# Patient Record
Sex: MALE | Race: WHITE | Employment: OTHER | URBAN - METROPOLITAN AREA
[De-identification: names, ages, dates, MRNs, and addresses within clinical notes are randomized per-mention and may not be internally consistent; named-entity substitution may affect disease eponyms.]

---

## 2018-09-06 ENCOUNTER — TELEPHONE (OUTPATIENT)
Dept: ADMINISTRATIVE | Age: 68
End: 2018-09-06

## 2018-09-06 ENCOUNTER — HOSPITAL ENCOUNTER (EMERGENCY)
Age: 68
Discharge: HOME | End: 2018-09-06
Attending: FAMILY MEDICINE
Payer: COMMERCIAL

## 2018-09-06 VITALS
HEART RATE: 87 BPM | BODY MASS INDEX: 31.5 KG/M2 | RESPIRATION RATE: 16 BRPM | WEIGHT: 220 LBS | HEIGHT: 70 IN | OXYGEN SATURATION: 96 % | TEMPERATURE: 98 F | SYSTOLIC BLOOD PRESSURE: 147 MMHG | DIASTOLIC BLOOD PRESSURE: 102 MMHG

## 2018-09-06 DIAGNOSIS — I16.0 HYPERTENSIVE URGENCY: Primary | ICD-10-CM

## 2018-09-06 LAB
ANION GAP SERPL CALCULATED.3IONS-SCNC: 13 MMOL/L (ref 7–16)
BUN BLDV-MCNC: 15 MG/DL (ref 8–23)
CALCIUM SERPL-MCNC: 10.2 MG/DL (ref 8.6–10.2)
CHLORIDE BLD-SCNC: 107 MMOL/L (ref 98–107)
CO2: 23 MMOL/L (ref 22–29)
CREAT SERPL-MCNC: 1.1 MG/DL (ref 0.7–1.2)
EKG ATRIAL RATE: 101 BPM
EKG P AXIS: 15 DEGREES
EKG P-R INTERVAL: 164 MS
EKG Q-T INTERVAL: 346 MS
EKG QRS DURATION: 82 MS
EKG QTC CALCULATION (BAZETT): 448 MS
EKG R AXIS: -24 DEGREES
EKG T AXIS: -5 DEGREES
EKG VENTRICULAR RATE: 101 BPM
GFR AFRICAN AMERICAN: >60
GFR NON-AFRICAN AMERICAN: >60 ML/MIN/1.73
GLUCOSE BLD-MCNC: 106 MG/DL (ref 74–109)
HCT VFR BLD CALC: 42.2 % (ref 37–54)
HEMOGLOBIN: 14 G/DL (ref 12.5–16.5)
MCH RBC QN AUTO: 32.6 PG (ref 26–35)
MCHC RBC AUTO-ENTMCNC: 33.2 % (ref 32–34.5)
MCV RBC AUTO: 98.1 FL (ref 80–99.9)
PDW BLD-RTO: 12.3 FL (ref 11.5–15)
PLATELET # BLD: 228 E9/L (ref 130–450)
PMV BLD AUTO: 8.9 FL (ref 7–12)
POTASSIUM SERPL-SCNC: 4.4 MMOL/L (ref 3.5–5)
RBC # BLD: 4.3 E12/L (ref 3.8–5.8)
SODIUM BLD-SCNC: 143 MMOL/L (ref 132–146)
TROPONIN: <0.01 NG/ML (ref 0–0.03)
WBC # BLD: 9.4 E9/L (ref 4.5–11.5)

## 2018-09-06 PROCEDURE — 93005 ELECTROCARDIOGRAM TRACING: CPT | Performed by: FAMILY MEDICINE

## 2018-09-06 PROCEDURE — 84484 ASSAY OF TROPONIN QUANT: CPT

## 2018-09-06 PROCEDURE — 85027 COMPLETE CBC AUTOMATED: CPT

## 2018-09-06 PROCEDURE — 80048 BASIC METABOLIC PNL TOTAL CA: CPT

## 2018-09-06 PROCEDURE — 36415 COLL VENOUS BLD VENIPUNCTURE: CPT

## 2018-09-06 PROCEDURE — 99283 EMERGENCY DEPT VISIT LOW MDM: CPT

## 2018-09-06 PROCEDURE — 6370000000 HC RX 637 (ALT 250 FOR IP): Performed by: FAMILY MEDICINE

## 2018-09-06 RX ORDER — CLONIDINE HYDROCHLORIDE 0.1 MG/1
0.1 TABLET ORAL 2 TIMES DAILY
Qty: 30 TABLET | Refills: 0 | Status: SHIPPED | OUTPATIENT
Start: 2018-09-06 | End: 2018-09-21 | Stop reason: SDUPTHER

## 2018-09-06 RX ORDER — CLONIDINE HYDROCHLORIDE 0.1 MG/1
0.2 TABLET ORAL ONCE
Status: COMPLETED | OUTPATIENT
Start: 2018-09-06 | End: 2018-09-06

## 2018-09-06 RX ADMIN — CLONIDINE HYDROCHLORIDE 0.2 MG: 0.1 TABLET ORAL at 13:40

## 2018-09-06 NOTE — TELEPHONE ENCOUNTER
bp was elevated 22/170. Marquis Cavazos told me to tell him to go to emergency or Urgent care. He will still keep appt for Dr Mare Desir at 2:30. His sister is going to try and talk him into going. He mentioned he don't want to sit around for 6 hrs so I told him to go to Urgent care down the road.

## 2018-09-06 NOTE — ED PROVIDER NOTES
HPI:  9/6/18,   Time: 1:34 PM         Dieter Moncada is a 76 y.o. male presenting to the ED for elevated blood pressure found when visiting nurse saw patient at home, beginning several hours ago. The complaint has been persistent, severe in severity, and worsened by nothing. Patient denies any headache blurred vision nausea vomiting neck pain chest or abdominal pain no shortness of breath no palpitations. Denies any numbness tingling or weakness. No leg swelling. He has a doctor's appointment tomorrow he is under no current medications. Denies any history of hypertension. ROS:   Pertinent positives and negatives are stated within HPI, all other systems reviewed and are negative.  --------------------------------------------- PAST HISTORY ---------------------------------------------  Past Medical History:  has no past medical history on file. Past Surgical History:  has no past surgical history on file. Social History:  reports that he has never smoked. He has never used smokeless tobacco. He reports that he does not drink alcohol or use drugs. Family History: family history is not on file. The patients home medications have been reviewed. Allergies: Patient has no known allergies.     -------------------------------------------------- RESULTS -------------------------------------------------  All laboratory and radiology results have been personally reviewed by myself   LABS:  Results for orders placed or performed during the hospital encounter of 51/40/57   Basic metabolic panel   Result Value Ref Range    Sodium 143 132 - 146 mmol/L    Potassium 4.4 3.5 - 5.0 mmol/L    Chloride 107 98 - 107 mmol/L    CO2 23 22 - 29 mmol/L    Anion Gap 13 7 - 16 mmol/L    Glucose 106 74 - 109 mg/dL    BUN 15 8 - 23 mg/dL    CREATININE 1.1 0.7 - 1.2 mg/dL    GFR Non-African American >60 >=60 mL/min/1.73    GFR African American >60     Calcium 10.2 8.6 - 10.2 mg/dL   CBC   Result Value Ref Range    WBC 9.4 4.5 Affect      ------------------------------ ED COURSE/MEDICAL DECISION MAKING----------------------  Medications   cloNIDine (CATAPRES) tablet 0.2 mg (0.2 mg Oral Given 9/6/18 1340)         Medical Decision Making:    Patient is asymptomatic no focal neurological findings or headache no blurred vision no chest pain no abdominal pain no neck pain blood pressure improved will discharge home advised follow-up tomorrow with his PMD for further long-term blood pressure control    Counseling: The emergency provider has spoken with the patient and spouse/SO and discussed todays results, in addition to providing specific details for the plan of care and counseling regarding the diagnosis and prognosis. Questions are answered at this time and they are agreeable with the plan.      --------------------------------- IMPRESSION AND DISPOSITION ---------------------------------    IMPRESSION  1.  Hypertensive urgency        DISPOSITION  Disposition: Discharge to home  Patient condition is good                  Jovita Gr MD  09/06/18 1500

## 2018-09-06 NOTE — ED TRIAGE NOTES
Co headache intermittently for couple of months. Home nurse checked his b/p today & found his bp to be elevated. Pt denies co chest pain, visual defects, lightheadedness.  Pt has Dr dias tomorrow with PCP

## 2018-09-07 ENCOUNTER — OFFICE VISIT (OUTPATIENT)
Dept: FAMILY MEDICINE CLINIC | Age: 68
End: 2018-09-07
Payer: COMMERCIAL

## 2018-09-07 ENCOUNTER — HOSPITAL ENCOUNTER (OUTPATIENT)
Age: 68
Discharge: HOME | End: 2018-09-09
Payer: COMMERCIAL

## 2018-09-07 VITALS
BODY MASS INDEX: 33.21 KG/M2 | DIASTOLIC BLOOD PRESSURE: 121 MMHG | OXYGEN SATURATION: 91 % | TEMPERATURE: 98.3 F | HEIGHT: 70 IN | RESPIRATION RATE: 20 BRPM | WEIGHT: 232 LBS | HEART RATE: 150 BPM | SYSTOLIC BLOOD PRESSURE: 180 MMHG

## 2018-09-07 DIAGNOSIS — Z11.59 NEED FOR HEPATITIS C SCREENING TEST: ICD-10-CM

## 2018-09-07 DIAGNOSIS — I10 ESSENTIAL HYPERTENSION: Primary | ICD-10-CM

## 2018-09-07 DIAGNOSIS — E74.89 OTHER SPECIFIED DISORDERS OF CARBOHYDRATE METABOLISM (HCC): ICD-10-CM

## 2018-09-07 DIAGNOSIS — I10 ESSENTIAL HYPERTENSION: ICD-10-CM

## 2018-09-07 LAB
CHOLESTEROL, TOTAL: 156 MG/DL (ref 0–199)
HBA1C MFR BLD: 6 % (ref 4–5.6)
HDLC SERPL-MCNC: 46 MG/DL
LDL CHOLESTEROL CALCULATED: 79 MG/DL (ref 0–99)
TRIGL SERPL-MCNC: 155 MG/DL (ref 0–149)
TSH SERPL DL<=0.05 MIU/L-ACNC: 0.96 UIU/ML (ref 0.27–4.2)
VLDLC SERPL CALC-MCNC: 31 MG/DL

## 2018-09-07 PROCEDURE — 99203 OFFICE O/P NEW LOW 30 MIN: CPT | Performed by: FAMILY MEDICINE

## 2018-09-07 PROCEDURE — 86803 HEPATITIS C AB TEST: CPT

## 2018-09-07 PROCEDURE — 83036 HEMOGLOBIN GLYCOSYLATED A1C: CPT

## 2018-09-07 PROCEDURE — 84443 ASSAY THYROID STIM HORMONE: CPT

## 2018-09-07 PROCEDURE — 80061 LIPID PANEL: CPT

## 2018-09-07 RX ORDER — HYDROCHLOROTHIAZIDE 12.5 MG/1
12.5 TABLET ORAL DAILY
Qty: 30 TABLET | Refills: 3 | Status: ON HOLD | OUTPATIENT
Start: 2018-09-07 | End: 2018-10-25 | Stop reason: HOSPADM

## 2018-09-07 ASSESSMENT — PATIENT HEALTH QUESTIONNAIRE - PHQ9
2. FEELING DOWN, DEPRESSED OR HOPELESS: 0
SUM OF ALL RESPONSES TO PHQ9 QUESTIONS 1 & 2: 0
SUM OF ALL RESPONSES TO PHQ QUESTIONS 1-9: 0
SUM OF ALL RESPONSES TO PHQ QUESTIONS 1-9: 0
1. LITTLE INTEREST OR PLEASURE IN DOING THINGS: 0

## 2018-09-07 NOTE — PROGRESS NOTES
Subjective:     Patient: Marlin Marsh is a 76 y.o. male    [facility] sent out nurse. Never told that before. Hasn't been to doctor in 39 years. Denies CP, palp, SOB, cogh, blurry vision or HA. Advil 3tablets twice dialy    Review of Systems     No Known Allergies  Current Outpatient Prescriptions on File Prior to Visit   Medication Sig Dispense Refill    cloNIDine (CATAPRES) 0.1 MG tablet Take 1 tablet by mouth 2 times daily 30 tablet 0     No current facility-administered medications on file prior to visit. Past Medical History:   Diagnosis Date    Hypertension       There is no problem list on file for this patient. Social History   Substance Use Topics    Smoking status: Never Smoker    Smokeless tobacco: Never Used    Alcohol use No          Objective:     BP (!) 180/121   Pulse 150   Temp 98.3 °F (36.8 °C) (Oral)   Resp 20   Ht 5' 10\" (1.778 m)   Wt 232 lb (105.2 kg)   SpO2 91%   BMI 33.29 kg/m²     Physical Exam   Constitutional: He appears well-developed and well-nourished. Cardiovascular: Normal rate, regular rhythm, normal heart sounds and intact distal pulses. Exam reveals no gallop. No murmur heard. DP pulse 2/4 without edema   Pulmonary/Chest: Effort normal and breath sounds normal. No respiratory distress. He has no wheezes. Assessment      1. Essential hypertension    2. Need for hepatitis C screening test    3. Other specified disorders [facility] carbohydrate [facility] ([facility]          Plan      1. Essential hypertension  - TSH without Reflex; Future  - Lipid Panel; Future  - Hemoglobin A1C; Future  - hydrochlorothiazide (HYDRODIURIL) 12.5 MG tablet; Take 1 tablet by mouth daily  Dispense: 30 tablet; Refill: 3  BP not at goal will start HCTZ and reeval in 1-2 weeks. Eventually will get patietn off clonidine. 2. Need for hepatitis C screening test  - Hepatitis C Antibody; Future    3.  Other specified disorders of carbohydrate metabolism ([facility]   - Hemoglobin A1C; Future      Janett Pederson DO  9/7/18  2:59 PM

## 2018-09-10 LAB — HEPATITIS C ANTIBODY INTERPRETATION: NORMAL

## 2018-09-21 ENCOUNTER — OFFICE VISIT (OUTPATIENT)
Dept: FAMILY MEDICINE CLINIC | Age: 68
End: 2018-09-21
Payer: COMMERCIAL

## 2018-09-21 VITALS
WEIGHT: 224 LBS | RESPIRATION RATE: 14 BRPM | DIASTOLIC BLOOD PRESSURE: 98 MMHG | BODY MASS INDEX: 32.07 KG/M2 | OXYGEN SATURATION: 97 % | TEMPERATURE: 97.7 F | HEART RATE: 81 BPM | SYSTOLIC BLOOD PRESSURE: 140 MMHG | HEIGHT: 70 IN

## 2018-09-21 DIAGNOSIS — I10 ESSENTIAL HYPERTENSION: Primary | ICD-10-CM

## 2018-09-21 DIAGNOSIS — Z12.11 SCREEN FOR COLON CANCER: ICD-10-CM

## 2018-09-21 DIAGNOSIS — Z00.00 GENERAL MEDICAL EXAM: ICD-10-CM

## 2018-09-21 PROCEDURE — 99214 OFFICE O/P EST MOD 30 MIN: CPT | Performed by: FAMILY MEDICINE

## 2018-09-21 RX ORDER — CLONIDINE HYDROCHLORIDE 0.1 MG/1
TABLET ORAL
Qty: 5 TABLET | Refills: 0 | Status: SHIPPED | OUTPATIENT
Start: 2018-09-21 | End: 2018-10-19

## 2018-09-21 RX ORDER — AMLODIPINE BESYLATE 10 MG/1
10 TABLET ORAL DAILY
Qty: 30 TABLET | Refills: 3 | Status: ON HOLD | OUTPATIENT
Start: 2018-09-21 | End: 2018-10-25 | Stop reason: HOSPADM

## 2018-09-21 RX ORDER — MEDICAL SUPPLY, MISCELLANEOUS
EACH MISCELLANEOUS
Qty: 1 EACH | Refills: 0 | Status: SHIPPED | OUTPATIENT
Start: 2018-09-21 | End: 2019-03-06

## 2018-09-21 ASSESSMENT — ENCOUNTER SYMPTOMS
PHOTOPHOBIA: 0
CONSTIPATION: 0
RHINORRHEA: 0
COUGH: 0
SHORTNESS OF BREATH: 0
VOMITING: 0
SORE THROAT: 0
ABDOMINAL PAIN: 0
NAUSEA: 0
DIARRHEA: 0
BLOOD IN STOOL: 0
SINUS PRESSURE: 0
WHEEZING: 0

## 2018-09-21 NOTE — PROGRESS NOTES
Subjective:     Patient: Frandy Sanchez is a 76 y.o. male    HPI Denies CP, palpo, SOB, cough. Denies Problems with BP pills. One pill left from hospital.    Dry mouth. Review of Systems   Constitutional: Negative for chills, fatigue and fever. HENT: Negative for congestion, ear pain, rhinorrhea, sinus pressure and sore throat. Eyes: Negative for photophobia and visual disturbance. Respiratory: Negative for cough, shortness of breath and wheezing. Cardiovascular: Negative for chest pain, palpitations and leg swelling. Gastrointestinal: Negative for abdominal pain, blood in stool, constipation, diarrhea, nausea and vomiting. Neg melena   Endocrine: Negative for polydipsia, polyphagia and polyuria. Genitourinary: Negative for dysuria, frequency and urgency. Musculoskeletal: Negative for arthralgias and joint swelling. Skin: Negative for rash and wound. Neurological: Negative for dizziness, weakness, numbness and headaches. No Known Allergies  Current Outpatient Prescriptions on File Prior to Visit   Medication Sig Dispense Refill    hydrochlorothiazide (HYDRODIURIL) 12.5 MG tablet Take 1 tablet by mouth daily 30 tablet 3     No current facility-administered medications on file prior to visit. Past Medical History:   Diagnosis Date    Hypertension       There is no problem list on file for this patient. Social History   Substance Use Topics    Smoking status: Never Smoker    Smokeless tobacco: Never Used    Alcohol use No          Objective:     BP (!) 140/98 (Site: Left Upper Arm, Position: Sitting, Cuff Size: Large Adult)   Pulse 81   Temp 97.7 °F (36.5 °C) (Oral)   Resp 14   Ht 5' 10\" (1.778 m)   Wt 224 lb (101.6 kg)   SpO2 97%   BMI 32.14 kg/m²     Physical Exam   Constitutional: He is oriented to person, place, and time. He appears well-developed and well-nourished. No distress. HENT:   Head: Normocephalic and atraumatic.    Right Ear: External ear

## 2018-10-05 ENCOUNTER — OFFICE VISIT (OUTPATIENT)
Dept: FAMILY MEDICINE CLINIC | Age: 68
End: 2018-10-05
Payer: COMMERCIAL

## 2018-10-05 VITALS
SYSTOLIC BLOOD PRESSURE: 148 MMHG | WEIGHT: 221 LBS | HEIGHT: 70 IN | RESPIRATION RATE: 16 BRPM | OXYGEN SATURATION: 97 % | HEART RATE: 89 BPM | TEMPERATURE: 98.9 F | BODY MASS INDEX: 31.64 KG/M2 | DIASTOLIC BLOOD PRESSURE: 102 MMHG

## 2018-10-05 DIAGNOSIS — I10 ESSENTIAL HYPERTENSION: ICD-10-CM

## 2018-10-05 PROCEDURE — 99213 OFFICE O/P EST LOW 20 MIN: CPT | Performed by: FAMILY MEDICINE

## 2018-10-05 RX ORDER — LISINOPRIL 20 MG/1
20 TABLET ORAL DAILY
Qty: 30 TABLET | Refills: 3 | Status: SHIPPED | OUTPATIENT
Start: 2018-10-05 | End: 2018-10-19 | Stop reason: SINTOL

## 2018-10-19 ENCOUNTER — OFFICE VISIT (OUTPATIENT)
Dept: FAMILY MEDICINE CLINIC | Age: 68
End: 2018-10-19
Payer: COMMERCIAL

## 2018-10-19 ENCOUNTER — HOSPITAL ENCOUNTER (OUTPATIENT)
Age: 68
Discharge: HOME | End: 2018-10-19
Payer: COMMERCIAL

## 2018-10-19 VITALS
SYSTOLIC BLOOD PRESSURE: 146 MMHG | DIASTOLIC BLOOD PRESSURE: 88 MMHG | OXYGEN SATURATION: 99 % | TEMPERATURE: 98.2 F | BODY MASS INDEX: 31.64 KG/M2 | RESPIRATION RATE: 20 BRPM | HEART RATE: 84 BPM | HEIGHT: 70 IN | WEIGHT: 221 LBS

## 2018-10-19 DIAGNOSIS — I10 ESSENTIAL HYPERTENSION: Primary | ICD-10-CM

## 2018-10-19 DIAGNOSIS — I10 ESSENTIAL HYPERTENSION: ICD-10-CM

## 2018-10-19 LAB
ANION GAP SERPL CALCULATED.3IONS-SCNC: 16 MMOL/L (ref 7–16)
BUN BLDV-MCNC: 27 MG/DL (ref 8–23)
CALCIUM SERPL-MCNC: 10.1 MG/DL (ref 8.6–10.2)
CHLORIDE BLD-SCNC: 101 MMOL/L (ref 98–107)
CO2: 24 MMOL/L (ref 22–29)
CREAT SERPL-MCNC: 1.8 MG/DL (ref 0.7–1.2)
GFR AFRICAN AMERICAN: 46
GFR NON-AFRICAN AMERICAN: 38 ML/MIN/1.73
GLUCOSE BLD-MCNC: 120 MG/DL (ref 74–109)
POTASSIUM SERPL-SCNC: 4.1 MMOL/L (ref 3.5–5)
SODIUM BLD-SCNC: 141 MMOL/L (ref 132–146)

## 2018-10-19 PROCEDURE — 99213 OFFICE O/P EST LOW 20 MIN: CPT | Performed by: FAMILY MEDICINE

## 2018-10-19 PROCEDURE — 80048 BASIC METABOLIC PNL TOTAL CA: CPT

## 2018-10-19 PROCEDURE — 36415 COLL VENOUS BLD VENIPUNCTURE: CPT

## 2018-10-23 ENCOUNTER — HOSPITAL ENCOUNTER (INPATIENT)
Age: 68
LOS: 2 days | Discharge: HOME | DRG: 247 | End: 2018-10-25
Attending: EMERGENCY MEDICINE | Admitting: FAMILY MEDICINE
Payer: COMMERCIAL

## 2018-10-23 ENCOUNTER — HOSPITAL ENCOUNTER (OUTPATIENT)
Age: 68
Discharge: HOME | End: 2018-10-23
Payer: COMMERCIAL

## 2018-10-23 ENCOUNTER — APPOINTMENT (OUTPATIENT)
Dept: GENERAL RADIOLOGY | Age: 68
DRG: 247 | End: 2018-10-23
Payer: COMMERCIAL

## 2018-10-23 DIAGNOSIS — I21.9: ICD-10-CM

## 2018-10-23 DIAGNOSIS — I21.11 ST ELEVATION MYOCARDIAL INFARCTION INVOLVING RIGHT CORONARY ARTERY (HCC): Primary | ICD-10-CM

## 2018-10-23 PROBLEM — I10 ESSENTIAL HYPERTENSION: Status: ACTIVE | Noted: 2018-10-23

## 2018-10-23 PROBLEM — I21.3 STEMI (ST ELEVATION MYOCARDIAL INFARCTION) (HCC): Status: ACTIVE | Noted: 2018-10-23

## 2018-10-23 LAB
ABO/RH: NORMAL
ANION GAP SERPL CALCULATED.3IONS-SCNC: 17 MMOL/L (ref 7–16)
ANTIBODY IDENTIFICATION: NORMAL
ANTIBODY SCREEN: NORMAL
APTT: 29.1 SEC (ref 25.7–34.7)
BUN BLDV-MCNC: 30 MG/DL (ref 8–23)
CALCIUM SERPL-MCNC: 9.9 MG/DL (ref 8.6–10.2)
CHLORIDE BLD-SCNC: 99 MMOL/L (ref 98–107)
CO2: 23 MMOL/L (ref 22–29)
CREAT SERPL-MCNC: 1.7 MG/DL (ref 0.7–1.2)
DAT POLYSPECIFIC: NORMAL
DR. NOTIFY: NORMAL
EKG ATRIAL RATE: 80 BPM
EKG ATRIAL RATE: 87 BPM
EKG P AXIS: 27 DEGREES
EKG P AXIS: 32 DEGREES
EKG P-R INTERVAL: 162 MS
EKG P-R INTERVAL: 172 MS
EKG Q-T INTERVAL: 364 MS
EKG Q-T INTERVAL: 388 MS
EKG QRS DURATION: 102 MS
EKG QRS DURATION: 82 MS
EKG QTC CALCULATION (BAZETT): 438 MS
EKG QTC CALCULATION (BAZETT): 447 MS
EKG R AXIS: -24 DEGREES
EKG R AXIS: -34 DEGREES
EKG T AXIS: 22 DEGREES
EKG T AXIS: 34 DEGREES
EKG VENTRICULAR RATE: 80 BPM
EKG VENTRICULAR RATE: 87 BPM
GFR AFRICAN AMERICAN: 49
GFR NON-AFRICAN AMERICAN: 40 ML/MIN/1.73
GLUCOSE BLD-MCNC: 158 MG/DL (ref 74–109)
HCT VFR BLD CALC: 33.8 % (ref 37–54)
HEMOGLOBIN: 11.3 G/DL (ref 12.5–16.5)
LACTIC ACID: 2.1 MMOL/L (ref 0.5–2.2)
LV EF: 91 %
LVEF MODALITY: NORMAL
MCH RBC QN AUTO: 31 PG (ref 26–35)
MCHC RBC AUTO-ENTMCNC: 33.4 % (ref 32–34.5)
MCV RBC AUTO: 92.9 FL (ref 80–99.9)
PDW BLD-RTO: 11 FL (ref 11.5–15)
PLATELET # BLD: 242 E9/L (ref 130–450)
PMV BLD AUTO: 9.8 FL (ref 7–12)
POTASSIUM SERPL-SCNC: 3.6 MMOL/L (ref 3.5–5)
PRO-BNP: 283 PG/ML (ref 0–125)
RBC # BLD: 3.64 E12/L (ref 3.8–5.8)
SODIUM BLD-SCNC: 139 MMOL/L (ref 132–146)
TROPONIN: <0.01 NG/ML (ref 0–0.03)
WBC # BLD: 11.5 E9/L (ref 4.5–11.5)

## 2018-10-23 PROCEDURE — 99291 CRITICAL CARE FIRST HOUR: CPT

## 2018-10-23 PROCEDURE — 86880 COOMBS TEST DIRECT: CPT

## 2018-10-23 PROCEDURE — 93005 ELECTROCARDIOGRAM TRACING: CPT | Performed by: EMERGENCY MEDICINE

## 2018-10-23 PROCEDURE — 83605 ASSAY OF LACTIC ACID: CPT

## 2018-10-23 PROCEDURE — 36415 COLL VENOUS BLD VENIPUNCTURE: CPT

## 2018-10-23 PROCEDURE — 93458 L HRT ARTERY/VENTRICLE ANGIO: CPT | Performed by: INTERNAL MEDICINE

## 2018-10-23 PROCEDURE — C9606 PERC D-E COR REVASC W AMI S: HCPCS | Performed by: INTERNAL MEDICINE

## 2018-10-23 PROCEDURE — 71045 X-RAY EXAM CHEST 1 VIEW: CPT

## 2018-10-23 PROCEDURE — 93306 TTE W/DOPPLER COMPLETE: CPT

## 2018-10-23 PROCEDURE — C1874 STENT, COATED/COV W/DEL SYS: HCPCS

## 2018-10-23 PROCEDURE — 86850 RBC ANTIBODY SCREEN: CPT

## 2018-10-23 PROCEDURE — 6360000002 HC RX W HCPCS: Performed by: EMERGENCY MEDICINE

## 2018-10-23 PROCEDURE — 83880 ASSAY OF NATRIURETIC PEPTIDE: CPT

## 2018-10-23 PROCEDURE — 99222 1ST HOSP IP/OBS MODERATE 55: CPT | Performed by: FAMILY MEDICINE

## 2018-10-23 PROCEDURE — 99291 CRITICAL CARE FIRST HOUR: CPT | Performed by: INTERNAL MEDICINE

## 2018-10-23 PROCEDURE — 93005 ELECTROCARDIOGRAM TRACING: CPT | Performed by: INTERNAL MEDICINE

## 2018-10-23 PROCEDURE — 86900 BLOOD TYPING SEROLOGIC ABO: CPT

## 2018-10-23 PROCEDURE — C1894 INTRO/SHEATH, NON-LASER: HCPCS

## 2018-10-23 PROCEDURE — 86922 COMPATIBILITY TEST ANTIGLOB: CPT

## 2018-10-23 PROCEDURE — 6370000000 HC RX 637 (ALT 250 FOR IP): Performed by: EMERGENCY MEDICINE

## 2018-10-23 PROCEDURE — 2580000003 HC RX 258: Performed by: EMERGENCY MEDICINE

## 2018-10-23 PROCEDURE — C1887 CATHETER, GUIDING: HCPCS

## 2018-10-23 PROCEDURE — A0427 ALS1-EMERGENCY: HCPCS

## 2018-10-23 PROCEDURE — C1725 CATH, TRANSLUMIN NON-LASER: HCPCS

## 2018-10-23 PROCEDURE — 027034Z DILATION OF CORONARY ARTERY, ONE ARTERY WITH DRUG-ELUTING INTRALUMINAL DEVICE, PERCUTANEOUS APPROACH: ICD-10-PCS | Performed by: INTERNAL MEDICINE

## 2018-10-23 PROCEDURE — 85027 COMPLETE CBC AUTOMATED: CPT

## 2018-10-23 PROCEDURE — C1769 GUIDE WIRE: HCPCS

## 2018-10-23 PROCEDURE — 6370000000 HC RX 637 (ALT 250 FOR IP): Performed by: INTERNAL MEDICINE

## 2018-10-23 PROCEDURE — 2709999900 HC NON-CHARGEABLE SUPPLY

## 2018-10-23 PROCEDURE — 92941 PRQ TRLML REVSC TOT OCCL AMI: CPT | Performed by: INTERNAL MEDICINE

## 2018-10-23 PROCEDURE — B2111ZZ FLUOROSCOPY OF MULTIPLE CORONARY ARTERIES USING LOW OSMOLAR CONTRAST: ICD-10-PCS | Performed by: INTERNAL MEDICINE

## 2018-10-23 PROCEDURE — 6360000002 HC RX W HCPCS

## 2018-10-23 PROCEDURE — 2000000000 HC ICU R&B

## 2018-10-23 PROCEDURE — 96375 TX/PRO/DX INJ NEW DRUG ADDON: CPT

## 2018-10-23 PROCEDURE — 2700000000 HC OXYGEN THERAPY PER DAY

## 2018-10-23 PROCEDURE — A0425 GROUND MILEAGE: HCPCS

## 2018-10-23 PROCEDURE — 80048 BASIC METABOLIC PNL TOTAL CA: CPT

## 2018-10-23 PROCEDURE — 96374 THER/PROPH/DIAG INJ IV PUSH: CPT

## 2018-10-23 PROCEDURE — 2500000003 HC RX 250 WO HCPCS

## 2018-10-23 PROCEDURE — 86870 RBC ANTIBODY IDENTIFICATION: CPT

## 2018-10-23 PROCEDURE — 4A023N7 MEASUREMENT OF CARDIAC SAMPLING AND PRESSURE, LEFT HEART, PERCUTANEOUS APPROACH: ICD-10-PCS | Performed by: INTERNAL MEDICINE

## 2018-10-23 PROCEDURE — 84484 ASSAY OF TROPONIN QUANT: CPT

## 2018-10-23 PROCEDURE — 86901 BLOOD TYPING SEROLOGIC RH(D): CPT

## 2018-10-23 PROCEDURE — 2580000003 HC RX 258: Performed by: INTERNAL MEDICINE

## 2018-10-23 PROCEDURE — 85730 THROMBOPLASTIN TIME PARTIAL: CPT

## 2018-10-23 RX ORDER — MORPHINE SULFATE 4 MG/ML
INJECTION, SOLUTION INTRAMUSCULAR; INTRAVENOUS
Status: COMPLETED
Start: 2018-10-23 | End: 2018-10-23

## 2018-10-23 RX ORDER — SODIUM CHLORIDE 0.9 % (FLUSH) 0.9 %
10 SYRINGE (ML) INJECTION EVERY 12 HOURS SCHEDULED
Status: DISCONTINUED | OUTPATIENT
Start: 2018-10-23 | End: 2018-10-25 | Stop reason: HOSPADM

## 2018-10-23 RX ORDER — SODIUM CHLORIDE 0.9 % (FLUSH) 0.9 %
10 SYRINGE (ML) INJECTION PRN
Status: DISCONTINUED | OUTPATIENT
Start: 2018-10-23 | End: 2018-10-25 | Stop reason: HOSPADM

## 2018-10-23 RX ORDER — METOPROLOL SUCCINATE 50 MG/1
50 TABLET, EXTENDED RELEASE ORAL DAILY
Status: DISCONTINUED | OUTPATIENT
Start: 2018-10-23 | End: 2018-10-25

## 2018-10-23 RX ORDER — ATORVASTATIN CALCIUM 40 MG/1
80 TABLET, FILM COATED ORAL NIGHTLY
Status: DISCONTINUED | OUTPATIENT
Start: 2018-10-23 | End: 2018-10-25 | Stop reason: HOSPADM

## 2018-10-23 RX ORDER — ASPIRIN 81 MG/1
324 TABLET, CHEWABLE ORAL ONCE
Status: COMPLETED | OUTPATIENT
Start: 2018-10-23 | End: 2018-10-23

## 2018-10-23 RX ORDER — ONDANSETRON 2 MG/ML
4 INJECTION INTRAMUSCULAR; INTRAVENOUS EVERY 6 HOURS PRN
Status: DISCONTINUED | OUTPATIENT
Start: 2018-10-23 | End: 2018-10-25 | Stop reason: HOSPADM

## 2018-10-23 RX ORDER — ACETAMINOPHEN 325 MG/1
650 TABLET ORAL EVERY 4 HOURS PRN
Status: DISCONTINUED | OUTPATIENT
Start: 2018-10-23 | End: 2018-10-25 | Stop reason: HOSPADM

## 2018-10-23 RX ORDER — FAMOTIDINE 20 MG/1
20 TABLET, FILM COATED ORAL 2 TIMES DAILY
Status: DISCONTINUED | OUTPATIENT
Start: 2018-10-23 | End: 2018-10-25 | Stop reason: HOSPADM

## 2018-10-23 RX ORDER — ASPIRIN 81 MG/1
81 TABLET, CHEWABLE ORAL DAILY
Status: DISCONTINUED | OUTPATIENT
Start: 2018-10-24 | End: 2018-10-25 | Stop reason: HOSPADM

## 2018-10-23 RX ORDER — AMLODIPINE BESYLATE 10 MG/1
10 TABLET ORAL DAILY
Status: DISCONTINUED | OUTPATIENT
Start: 2018-10-23 | End: 2018-10-23

## 2018-10-23 RX ORDER — 0.9 % SODIUM CHLORIDE 0.9 %
500 INTRAVENOUS SOLUTION INTRAVENOUS ONCE
Status: COMPLETED | OUTPATIENT
Start: 2018-10-23 | End: 2018-10-23

## 2018-10-23 RX ORDER — MORPHINE SULFATE 2 MG/ML
2 INJECTION, SOLUTION INTRAMUSCULAR; INTRAVENOUS ONCE
Status: DISCONTINUED | OUTPATIENT
Start: 2018-10-23 | End: 2018-10-25 | Stop reason: HOSPADM

## 2018-10-23 RX ORDER — ONDANSETRON 2 MG/ML
4 INJECTION INTRAMUSCULAR; INTRAVENOUS ONCE
Status: COMPLETED | OUTPATIENT
Start: 2018-10-23 | End: 2018-10-23

## 2018-10-23 RX ORDER — SODIUM CHLORIDE 9 MG/ML
INJECTION, SOLUTION INTRAVENOUS CONTINUOUS
Status: DISCONTINUED | OUTPATIENT
Start: 2018-10-23 | End: 2018-10-24

## 2018-10-23 RX ORDER — NITROGLYCERIN 0.4 MG/1
0.4 TABLET SUBLINGUAL EVERY 5 MIN PRN
Status: DISCONTINUED | OUTPATIENT
Start: 2018-10-23 | End: 2018-10-25 | Stop reason: HOSPADM

## 2018-10-23 RX ORDER — HEPARIN SODIUM 10000 [USP'U]/ML
4000 INJECTION, SOLUTION INTRAVENOUS; SUBCUTANEOUS ONCE
Status: COMPLETED | OUTPATIENT
Start: 2018-10-23 | End: 2018-10-23

## 2018-10-23 RX ORDER — ONDANSETRON 2 MG/ML
INJECTION INTRAMUSCULAR; INTRAVENOUS
Status: COMPLETED
Start: 2018-10-23 | End: 2018-10-23

## 2018-10-23 RX ADMIN — Medication 10 ML: at 08:45

## 2018-10-23 RX ADMIN — ONDANSETRON HYDROCHLORIDE 4 MG: 2 SOLUTION INTRAMUSCULAR; INTRAVENOUS at 01:21

## 2018-10-23 RX ADMIN — TICAGRELOR 90 MG: 90 TABLET ORAL at 08:45

## 2018-10-23 RX ADMIN — SODIUM CHLORIDE 500 ML: 9 INJECTION, SOLUTION INTRAVENOUS at 01:21

## 2018-10-23 RX ADMIN — HEPARIN SODIUM 4000 UNITS: 10000 INJECTION, SOLUTION INTRAVENOUS; SUBCUTANEOUS at 01:20

## 2018-10-23 RX ADMIN — ONDANSETRON 4 MG: 2 INJECTION INTRAMUSCULAR; INTRAVENOUS at 01:21

## 2018-10-23 RX ADMIN — MORPHINE SULFATE 2 MG: 4 INJECTION INTRAVENOUS at 01:22

## 2018-10-23 RX ADMIN — TICAGRELOR 90 MG: 90 TABLET ORAL at 20:40

## 2018-10-23 RX ADMIN — FAMOTIDINE 20 MG: 20 TABLET, FILM COATED ORAL at 08:23

## 2018-10-23 RX ADMIN — ATORVASTATIN CALCIUM 80 MG: 40 TABLET, FILM COATED ORAL at 20:40

## 2018-10-23 RX ADMIN — TICAGRELOR 180 MG: 90 TABLET ORAL at 01:19

## 2018-10-23 RX ADMIN — SODIUM CHLORIDE: 9 INJECTION, SOLUTION INTRAVENOUS at 03:00

## 2018-10-23 RX ADMIN — Medication 10 ML: at 20:41

## 2018-10-23 RX ADMIN — METOPROLOL SUCCINATE 50 MG: 50 TABLET, FILM COATED, EXTENDED RELEASE ORAL at 08:22

## 2018-10-23 RX ADMIN — ASPIRIN 81 MG CHEWABLE TABLET 324 MG: 81 TABLET CHEWABLE at 01:19

## 2018-10-23 RX ADMIN — FAMOTIDINE 20 MG: 20 TABLET, FILM COATED ORAL at 20:40

## 2018-10-23 ASSESSMENT — PAIN SCALES - GENERAL
PAINLEVEL_OUTOF10: 0
PAINLEVEL_OUTOF10: 8
PAINLEVEL_OUTOF10: 0

## 2018-10-23 ASSESSMENT — HEART SCORE
ECG: 2
ECG: 2

## 2018-10-23 ASSESSMENT — PAIN DESCRIPTION - DESCRIPTORS: DESCRIPTORS: DISCOMFORT

## 2018-10-23 ASSESSMENT — PAIN DESCRIPTION - PAIN TYPE: TYPE: ACUTE PAIN

## 2018-10-23 ASSESSMENT — PAIN DESCRIPTION - LOCATION: LOCATION: CHEST

## 2018-10-23 NOTE — ED PROVIDER NOTES
sodium chloride bolus (500 mLs Intravenous New Bag 10/23/18 0121)   aspirin chewable tablet 324 mg (324 mg Oral Given 10/23/18 0119)   heparin (porcine) injection 4,000 Units (4,000 Units Intravenous Given 10/23/18 0120)   morphine (PF) 4 MG/ML injection (2 mg  Given 10/23/18 0122)   ondansetron (ZOFRAN) injection 4 mg (4 mg Intravenous Given 10/23/18 0121)     ED COURSE:     Medical Decision Making:   Differential Diagnoses:  Pneumonia, pericarditis, myocarditis, GERD, costochondritis, MI, PE, aortic dissection, to name a few  Pt's HEART Score = 8 points, HIGH Risk Score (assuming normal initial troponin)  Pt's Well's Score for PE = 0 points, Low Risk Score  Pt's CXR showed no focal infiltrates, no evidence pneumothorax and no mediastinal abnormalities nor any findings suspicious for aortic dissection    RADIOLOGY:  Interpreted by Radiologist.  No orders to display   Initial reading by EP:  No focal infiltrates no evidence pneumothorax and no mediastinal abnormalities nor any findings suspicious for aortic dissection    EKG: #1  This EKG is signed and interpreted by the EP. Time: 00:59  Rate: 75  Rhythm: Sinus  Interpretation:  Acute INF STEMI, 1st degree AVB  Comparison: Acute changes compared to old EKG dated 9/11/2018    EKG #2:  (RIGHT-sided EKG)  Time:  01:04    Rate: 70  Rhythm: Sinus. Interpretation: + RV Infarct confirmed in lead VR4. This patient's ED course included: a personal history and physicial examination, re-evaluation prior to disposition, multiple bedside re-evaluations, IV medications, cardiac monitoring and continuous pulse oximetry    This patient has remained hemodynamically stable and improved during their ED course. Re-Evaluations:       Re-evaluation.   Patients symptoms are improving    Re-examination  10/23/18   1:05 AM        Vital Signs:   Vitals:    10/23/18 0103 10/23/18 0109 10/23/18 0112   BP: (!) 142/72 (!) 142/72    Pulse: 73 76    Resp: 20     Temp:   97.7 °F (36.5 °C)

## 2018-10-23 NOTE — PROCEDURES
[address]                 [address] [id number],  [facility]                           CARDIAC CATHETERIZATION    PATIENT NAME: Becky Huffman                     :         1950  MED REC NO:   [id number]                            ROOM:       [id number]  ACCOUNT NO:   [de-identified]                           ADMIT DATE:  10/23/2018  PROVIDER:     Alin Araya MD    DATE OF PROCEDURE:  10/23/2018    PROCEDURES:  Left heart catheterization, selective coronary  angiography, and balloon angioplasty with the deployment of  drug-eluting coronary stent to the proximal/mid RCA with post-stent  deployment dilatation with a larger noncompliant balloon with  adjunctive heparin therapy and intracoronary nitroglycerin  administration. The patient received intravenous Versed and intravenous fentanyl for  sedation for chest pain. INDICATION FOR PROCEDURE:  Acute ST elevation and inferior wall  myocardial infarction. The procedure was started through a right radial artery approach. There was excessive tortuosity in the innominate artery and it was  extremely difficult to totally capture to selectively engage the right  coronary artery through that approach with repeated attempts to torque  the catheter resulting in kinking in the catheter. Accordingly, the  procedure was converted to a true right femoral artery approach. The  thoracic aorta with the ascending thoracic aorta, the aortic knob, and  descending aortic aorta also appeared to be excessively tortuous and  again there was difficulty torquing the catheters through that  excessive tortuosity in the thoracic aorta and one guide catheter  again was kinked and had to be replaced with another guide catheter. At this time torquing of the catheter was done while there was a wire  in the catheter to be able to selectively engage the right coronary  artery.     After documenting the total occlusion in the right with  #[id number] JR4 guide catheter with sideholes. The extra support  exchange wire was then successfully advanced through the total  occlusion in the proximal vessel and further distally into the vessel. Upon crossing the lesion, there was recannulization of the vessel. The site of stenosis/total occlusion was then dilated with the 2.5 mm  x 15 mm balloon, which was inflated at 14 atmospheres. A 3.5 mm x 26  mm Resolute Rhine drug-eluting coronary stent was then advanced to the  intended site and was deployed at 14 atmospheres. Finally, the stent  was post dilated with a 4.0 mm x 12 mm balloon, which was inflated  three times within the stented segment to 14 atmospheres more distally  and to 16 atmospheres more proximally. Contrast injection after  intracoronary nitroglycerin administration revealed excellent results  at the site without any significant residual stenosis with no evidence  of dissection of the flaps and with ANIKA-3 flow in the distal vessel. There was 50% disease in the distal vessel. There was 50% ostial  narrowing of the posterior descending artery branch. There were two  subdivisions of the posterolateral branch and there appeared to be  abrupt cutoff distally in the inferior subdivision probably from  distal embolization. The rest of the diagnostic procedure was then performed. The right femoral arterial sheath was securely sutured to the skin at  the end of the procedure. The right radial arterial sheath was  removed and a TR Band was applied with adequate hemostasis. The patient tolerated the procedure well and left the cardiac  catheterization laboratory in stable condition without chest pain. CONCLUSION:  1. Coronary artery. a. Left main, no significant disease. b.  LAD, around 20% proximal/mid vessel narrowing.  c.  70 to 80% disease of the first diagonal branch and 90% disease of  the second diagonal branch. d.   LCX : 80 to 90% stenosis of the first marginal

## 2018-10-23 NOTE — CONSULTS
nausea, vomiting,  diarrhea, constipation, rectal bleeding or tarry stools. :  He  denies dysuria or hematuria. PSYCH:  He denies mood changes, anxiety  or depression. NEURO:  He denies memory loss, motor weakness,  numbness, tingling or tremors. PAST MEDICAL HISTORY:  Hypertension. FAMILY HISTORY:  Significant for cancer in his father. Diabetes, high  blood pressure, heart disease and dementia in his mother. Heart  disease with history of heart attacks in his brother and sister. SOCIAL HISTORY:  The patient does not smoke. He denied alcohol or  illicit drug use. ALLERGIES:  The patient is listed as being allergic to LISINOPRIL:   Apparently on talking to his family, he was given lisinopril for blood  pressure but that caused him to have problems with his kidneys. MEDICATIONS:  Prior to admission included amlodipine and  hydrochlorothiazide. PHYSICAL EXAMINATION:  GENERAL APPEARANCE:  Middle-aged to elderly male who is not appearing  to be in acute distress. VITAL SIGNS:  Blood pressure 142/72, pulse 76 per minute, respiratory  rate 20, temperature 97.7. HEENT:  Atraumatic, normocephalic head. No jugular venous distension. No carotid bruits. Carotid upstrokes normal bilaterally. No  thyromegaly. Sclerae not icteric. No xanthelasmas. Mucous membranes  are moist.  CHEST:  Symmetrical and nontender. No deformities. LUNGS:  Clear bilaterally. HEART:  Normal S1 and S2. No S3 or S4. No murmurs or rubs. ABDOMEN:  Soft, nontender without organomegaly or mass. Normal bowel  sounds. No bruits. EXTREMITIES:  No edema. Pulses palpable. SKIN:  Normal turgor. No rashes or ulcers noted. NEURO:  Oriented x3. No motor or sensory deficits detected. REVIEW OF DIAGNOSTIC TESTS:  1. EKG from the ED showed sinus rhythm with ST elevations in the  inferior leads with reciprocal changes in the anterior leads  consistent with acute ST elevation inferior wall myocardial  infarction.   2.

## 2018-10-23 NOTE — PLAN OF CARE
Problem: Pain:  Goal: Patient's pain/discomfort is manageable  Patient's pain/discomfort is manageable   Outcome: Met This Shift    Goal: Control of acute pain  Control of acute pain   Outcome: Met This Shift    Goal: Control of chronic pain  Control of chronic pain   Outcome: Met This Shift      Problem: Falls - Risk of:  Goal: Will remain free from falls  Will remain free from falls   Outcome: Met This Shift    Goal: Absence of physical injury  Absence of physical injury   Outcome: Met This Shift

## 2018-10-24 LAB
ALBUMIN SERPL-MCNC: 3.4 G/DL (ref 3.5–5.2)
ALP BLD-CCNC: 81 U/L (ref 40–129)
ALT SERPL-CCNC: 11 U/L (ref 0–40)
ANION GAP SERPL CALCULATED.3IONS-SCNC: 12 MMOL/L (ref 7–16)
AST SERPL-CCNC: 34 U/L (ref 0–39)
BILIRUB SERPL-MCNC: 0.5 MG/DL (ref 0–1.2)
BUN BLDV-MCNC: 26 MG/DL (ref 8–23)
CALCIUM SERPL-MCNC: 9 MG/DL (ref 8.6–10.2)
CHLORIDE BLD-SCNC: 102 MMOL/L (ref 98–107)
CHOLESTEROL, TOTAL: 117 MG/DL (ref 0–199)
CO2: 24 MMOL/L (ref 22–29)
CREAT SERPL-MCNC: 1.7 MG/DL (ref 0.7–1.2)
GFR AFRICAN AMERICAN: 49
GFR NON-AFRICAN AMERICAN: 40 ML/MIN/1.73
GLUCOSE BLD-MCNC: 113 MG/DL (ref 74–109)
HCT VFR BLD CALC: 32.1 % (ref 37–54)
HDLC SERPL-MCNC: 34 MG/DL
HEMOGLOBIN: 10.5 G/DL (ref 12.5–16.5)
LDL CHOLESTEROL CALCULATED: 52 MG/DL (ref 0–99)
MCH RBC QN AUTO: 30.7 PG (ref 26–35)
MCHC RBC AUTO-ENTMCNC: 32.7 % (ref 32–34.5)
MCV RBC AUTO: 93.9 FL (ref 80–99.9)
PDW BLD-RTO: 11.3 FL (ref 11.5–15)
PLATELET # BLD: 215 E9/L (ref 130–450)
PMV BLD AUTO: 10 FL (ref 7–12)
POTASSIUM REFLEX MAGNESIUM: 4 MMOL/L (ref 3.5–5)
RBC # BLD: 3.42 E12/L (ref 3.8–5.8)
SODIUM BLD-SCNC: 138 MMOL/L (ref 132–146)
TOTAL PROTEIN: 6.2 G/DL (ref 6.4–8.3)
TRIGL SERPL-MCNC: 153 MG/DL (ref 0–149)
VLDLC SERPL CALC-MCNC: 31 MG/DL
WBC # BLD: 10.4 E9/L (ref 4.5–11.5)

## 2018-10-24 PROCEDURE — 96375 TX/PRO/DX INJ NEW DRUG ADDON: CPT

## 2018-10-24 PROCEDURE — 99233 SBSQ HOSP IP/OBS HIGH 50: CPT | Performed by: INTERNAL MEDICINE

## 2018-10-24 PROCEDURE — 85027 COMPLETE CBC AUTOMATED: CPT

## 2018-10-24 PROCEDURE — 80053 COMPREHEN METABOLIC PANEL: CPT

## 2018-10-24 PROCEDURE — 2140000000 HC CCU INTERMEDIATE R&B

## 2018-10-24 PROCEDURE — 80061 LIPID PANEL: CPT

## 2018-10-24 PROCEDURE — 2580000003 HC RX 258: Performed by: INTERNAL MEDICINE

## 2018-10-24 PROCEDURE — 99232 SBSQ HOSP IP/OBS MODERATE 35: CPT | Performed by: STUDENT IN AN ORGANIZED HEALTH CARE EDUCATION/TRAINING PROGRAM

## 2018-10-24 PROCEDURE — 36415 COLL VENOUS BLD VENIPUNCTURE: CPT

## 2018-10-24 PROCEDURE — 6370000000 HC RX 637 (ALT 250 FOR IP): Performed by: INTERNAL MEDICINE

## 2018-10-24 RX ADMIN — Medication 10 ML: at 20:35

## 2018-10-24 RX ADMIN — TICAGRELOR 90 MG: 90 TABLET ORAL at 20:35

## 2018-10-24 RX ADMIN — FAMOTIDINE 20 MG: 20 TABLET, FILM COATED ORAL at 08:21

## 2018-10-24 RX ADMIN — ASPIRIN 81 MG CHEWABLE TABLET 81 MG: 81 TABLET CHEWABLE at 08:25

## 2018-10-24 RX ADMIN — FAMOTIDINE 20 MG: 20 TABLET, FILM COATED ORAL at 20:33

## 2018-10-24 RX ADMIN — METOPROLOL SUCCINATE 50 MG: 50 TABLET, FILM COATED, EXTENDED RELEASE ORAL at 08:20

## 2018-10-24 RX ADMIN — ATORVASTATIN CALCIUM 80 MG: 40 TABLET, FILM COATED ORAL at 20:33

## 2018-10-24 RX ADMIN — Medication 10 ML: at 08:25

## 2018-10-24 RX ADMIN — TICAGRELOR 90 MG: 90 TABLET ORAL at 08:25

## 2018-10-24 ASSESSMENT — PAIN SCALES - GENERAL
PAINLEVEL_OUTOF10: 0

## 2018-10-25 VITALS
DIASTOLIC BLOOD PRESSURE: 72 MMHG | RESPIRATION RATE: 27 BRPM | BODY MASS INDEX: 31.21 KG/M2 | HEART RATE: 73 BPM | SYSTOLIC BLOOD PRESSURE: 121 MMHG | HEIGHT: 70 IN | WEIGHT: 218 LBS | TEMPERATURE: 98.7 F | OXYGEN SATURATION: 100 %

## 2018-10-25 PROCEDURE — 99232 SBSQ HOSP IP/OBS MODERATE 35: CPT | Performed by: INTERNAL MEDICINE

## 2018-10-25 PROCEDURE — 99239 HOSP IP/OBS DSCHRG MGMT >30: CPT | Performed by: FAMILY MEDICINE

## 2018-10-25 PROCEDURE — 6370000000 HC RX 637 (ALT 250 FOR IP): Performed by: INTERNAL MEDICINE

## 2018-10-25 PROCEDURE — 2580000003 HC RX 258: Performed by: INTERNAL MEDICINE

## 2018-10-25 RX ORDER — FAMOTIDINE 20 MG/1
20 TABLET, FILM COATED ORAL 2 TIMES DAILY
Qty: 60 TABLET | Refills: 3 | Status: SHIPPED | OUTPATIENT
Start: 2018-10-25 | End: 2019-01-08 | Stop reason: SDUPTHER

## 2018-10-25 RX ORDER — METOPROLOL SUCCINATE 25 MG/1
25 TABLET, EXTENDED RELEASE ORAL DAILY
Qty: 30 TABLET | Refills: 3 | Status: SHIPPED | OUTPATIENT
Start: 2018-10-26 | End: 2019-01-08 | Stop reason: SDUPTHER

## 2018-10-25 RX ORDER — ATORVASTATIN CALCIUM 80 MG/1
80 TABLET, FILM COATED ORAL NIGHTLY
Qty: 30 TABLET | Refills: 3 | Status: SHIPPED | OUTPATIENT
Start: 2018-10-25 | End: 2019-01-08 | Stop reason: SDUPTHER

## 2018-10-25 RX ORDER — METOPROLOL SUCCINATE 25 MG/1
25 TABLET, EXTENDED RELEASE ORAL DAILY
Status: DISCONTINUED | OUTPATIENT
Start: 2018-10-25 | End: 2018-10-25 | Stop reason: HOSPADM

## 2018-10-25 RX ORDER — ASPIRIN 81 MG/1
81 TABLET, CHEWABLE ORAL DAILY
Qty: 30 TABLET | Refills: 3 | Status: SHIPPED | OUTPATIENT
Start: 2018-10-26 | End: 2018-11-26 | Stop reason: SDUPTHER

## 2018-10-25 RX ADMIN — ASPIRIN 81 MG CHEWABLE TABLET 81 MG: 81 TABLET CHEWABLE at 09:07

## 2018-10-25 RX ADMIN — METOPROLOL SUCCINATE 25 MG: 25 TABLET, FILM COATED, EXTENDED RELEASE ORAL at 09:05

## 2018-10-25 RX ADMIN — Medication 10 ML: at 09:06

## 2018-10-25 RX ADMIN — TICAGRELOR 90 MG: 90 TABLET ORAL at 09:07

## 2018-10-25 RX ADMIN — FAMOTIDINE 20 MG: 20 TABLET, FILM COATED ORAL at 09:05

## 2018-10-25 ASSESSMENT — PAIN SCALES - GENERAL
PAINLEVEL_OUTOF10: 0

## 2018-10-25 NOTE — PROGRESS NOTES
[facility], Family Medicine Residency Program  Resident Progress  Note      Patient:  Ilya Gardner 76 y.o. male MRN: [id number]     Date of Service: 10/25/2018      [facility] day 2   Patient is being observed after PCI and Stent placement in RCA after STEMI. Subjective      Patient was seen and examined this am. No complaints, no chest pain. Objective     Physical Exam:  · Vitals: /72   Pulse 73   Temp 98.7 °F (37.1 °C) (Oral)   Resp 27   Ht 5' 10\" (1.778 m)   Wt 218 lb (98.9 kg)   SpO2 100%   BMI 31.28 kg/m²     · General Appearance: AAOX3  · HEENT:  NC/AT. · Lung: Clear breath sounds B/l  · Heart: RRR, normal S1, S2. No MRG  · Abdomen: Soft, NT, ND. BS +radha.      Pertinent/ New Labs and Imaging Studies     CBC:   Lab Results   Component Value Date    WBC 10.4 10/24/2018    RBC 3.42 10/24/2018    HGB 10.5 10/24/2018    HCT 32.1 10/24/2018     10/24/2018    MCV 93.9 10/24/2018     BMP:    Lab Results   Component Value Date     10/24/2018    K 4.0 10/24/2018     10/24/2018    CO2 24 10/24/2018    BUN 26 10/24/2018    CREATININE 1.7 10/24/2018    GLUCOSE 113 10/24/2018    CALCIUM 9.0 10/24/2018       Resident's Assessment and PLan     S/p PCI and RCA stent placement for Acute ST elevation inferior wall MI  Stent placement done   Today felling well, no complaints, vital stable   Able to ambulate without anginal symptoms or shortness of breath  Aspirin, ticagrelor, atorvastatin     HTN   Review meds for dischargeMetoprolol, Norvasc stopped      GI ppx  - Betsy Young M.D., PGY1  Family Medicine   Attending physician: Dr. Robina Brown
sodium chloride flush 0.9 % injection 10 mL  10 mL Intravenous PRN Lazarus Georgi, MD        acetaminophen (TYLENOL) tablet 650 mg  650 mg Oral Q4H PRN Lazarus Georgi, MD        magnesium hydroxide (MILK OF MAGNESIA) 400 MG/5ML suspension 30 mL  30 mL Oral Daily PRN Lazarus Georgi, MD        ondansetron [facility]) injection 4 mg  4 mg Intravenous Q6H PRN Lazarus Georgi, MD        famotidine (PEPCID) tablet 20 mg  20 mg Oral BID Lazarus Georgi, MD   20 mg at 10/25/18 7857    atorvastatin (LIPITOR) tablet 80 mg  80 mg Oral Nightly Lazarus Georgi, MD   80 mg at 10/24/18 2033    aspirin chewable tablet 81 mg  81 mg Oral Daily Lazarus Georgi, MD   81 mg at 10/25/18 3516    ticagrelor (BRILINTA) tablet 90 mg  90 mg Oral BID Lazarus Georgi, MD   90 mg at 10/25/18 9768    nitroGLYCERIN (NITROSTAT) SL tablet 0.4 mg  0.4 mg Sublingual Q5 Min PRN Lazarus Georgi, MD        perflutren lipid microspheres (DEFINITY) injection 1.65 mg  1.5 mL Intravenous ONCE PRN Lazarus Georgi, MD        perflutren lipid microspheres (DEFINITY) injection 1.65 mg  1.5 mL Intravenous ONCE PRN Martin Kurtz MD         Current Outpatient Prescriptions   Medication Sig Dispense Refill    [START ON 10/26/2018] aspirin 81 MG chewable tablet Take 1 tablet by mouth daily 30 tablet 3    atorvastatin (LIPITOR) 80 MG tablet Take 1 tablet by mouth nightly 30 tablet 3    [START ON 10/26/2018] metoprolol succinate (TOPROL XL) 25 MG extended release tablet Take 1 tablet by mouth daily 30 tablet 3    ticagrelor (BRILINTA) 90 MG TABS tablet Take 1 tablet by mouth 2 times daily 60 tablet 3    famotidine (PEPCID) 20 MG tablet Take 1 tablet by mouth 2 times daily 60 tablet 3    Blood Pressure Monitoring (B-D ASSURE BPM/AUTO ARM CUFF) MISC Use daily 1 each 0          Attending Physician Statement  I have reviewed the chart, including any radiology or labs, and seen the patient with the resident(s).   I personally reviewed and performed key elements of the history

## 2018-10-26 LAB
EKG ATRIAL RATE: 69 BPM
EKG P AXIS: 20 DEGREES
EKG P-R INTERVAL: 232 MS
EKG Q-T INTERVAL: 374 MS
EKG QRS DURATION: 90 MS
EKG QTC CALCULATION (BAZETT): 400 MS
EKG R AXIS: -13 DEGREES
EKG T AXIS: 90 DEGREES
EKG VENTRICULAR RATE: 69 BPM

## 2018-10-29 ENCOUNTER — TELEPHONE (OUTPATIENT)
Dept: FAMILY MEDICINE CLINIC | Age: 68
End: 2018-10-29

## 2018-10-30 LAB
BLOOD BANK DISPENSE STATUS: NORMAL
BLOOD BANK DISPENSE STATUS: NORMAL
BLOOD BANK PRODUCT CODE: NORMAL
BLOOD BANK PRODUCT CODE: NORMAL
BPU ID: NORMAL
BPU ID: NORMAL
DESCRIPTION BLOOD BANK: NORMAL
DESCRIPTION BLOOD BANK: NORMAL

## 2018-11-05 ENCOUNTER — OFFICE VISIT (OUTPATIENT)
Dept: FAMILY MEDICINE CLINIC | Age: 68
End: 2018-11-05
Payer: COMMERCIAL

## 2018-11-05 VITALS
DIASTOLIC BLOOD PRESSURE: 100 MMHG | TEMPERATURE: 98 F | SYSTOLIC BLOOD PRESSURE: 180 MMHG | BODY MASS INDEX: 31.54 KG/M2 | HEART RATE: 48 BPM | WEIGHT: 219.8 LBS | RESPIRATION RATE: 12 BRPM | OXYGEN SATURATION: 97 %

## 2018-11-05 DIAGNOSIS — I21.11 ST ELEVATION MYOCARDIAL INFARCTION INVOLVING RIGHT CORONARY ARTERY (HCC): Primary | ICD-10-CM

## 2018-11-05 DIAGNOSIS — I10 ESSENTIAL HYPERTENSION: ICD-10-CM

## 2018-11-05 PROCEDURE — 99214 OFFICE O/P EST MOD 30 MIN: CPT | Performed by: FAMILY MEDICINE

## 2018-11-05 PROCEDURE — 1111F DSCHRG MED/CURRENT MED MERGE: CPT | Performed by: FAMILY MEDICINE

## 2018-11-05 RX ORDER — HYDROCHLOROTHIAZIDE 12.5 MG/1
12.5 TABLET ORAL DAILY
Qty: 30 TABLET | Refills: 3 | Status: SHIPPED | OUTPATIENT
Start: 2018-11-05 | End: 2019-02-26

## 2018-11-05 RX ORDER — LISINOPRIL 20 MG/1
20 TABLET ORAL DAILY
Qty: 30 TABLET | Refills: 3 | Status: SHIPPED | OUTPATIENT
Start: 2018-11-05 | End: 2018-11-09

## 2018-11-06 ENCOUNTER — OFFICE VISIT (OUTPATIENT)
Dept: CARDIOLOGY CLINIC | Age: 68
End: 2018-11-06
Payer: COMMERCIAL

## 2018-11-06 VITALS
RESPIRATION RATE: 20 BRPM | WEIGHT: 221.4 LBS | HEIGHT: 70 IN | HEART RATE: 66 BPM | OXYGEN SATURATION: 96 % | DIASTOLIC BLOOD PRESSURE: 82 MMHG | SYSTOLIC BLOOD PRESSURE: 172 MMHG | BODY MASS INDEX: 31.7 KG/M2

## 2018-11-06 DIAGNOSIS — I25.10 CORONARY ARTERY DISEASE INVOLVING NATIVE CORONARY ARTERY OF NATIVE HEART WITHOUT ANGINA PECTORIS: Primary | ICD-10-CM

## 2018-11-06 DIAGNOSIS — N28.9 ACUTE RENAL INSUFFICIENCY: ICD-10-CM

## 2018-11-06 DIAGNOSIS — I21.11 ST ELEVATION MYOCARDIAL INFARCTION INVOLVING RIGHT CORONARY ARTERY (HCC): ICD-10-CM

## 2018-11-06 DIAGNOSIS — R06.09 DYSPNEA ON EXERTION: ICD-10-CM

## 2018-11-06 PROCEDURE — 99213 OFFICE O/P EST LOW 20 MIN: CPT | Performed by: NURSE PRACTITIONER

## 2018-11-06 PROCEDURE — 93000 ELECTROCARDIOGRAM COMPLETE: CPT | Performed by: INTERNAL MEDICINE

## 2018-11-06 RX ORDER — ISOSORBIDE MONONITRATE 30 MG/1
30 TABLET, EXTENDED RELEASE ORAL DAILY
Qty: 30 TABLET | Refills: 5 | Status: SHIPPED | OUTPATIENT
Start: 2018-11-06 | End: 2019-01-08 | Stop reason: SDUPTHER

## 2018-11-06 RX ORDER — NITROGLYCERIN 0.4 MG/1
0.4 TABLET SUBLINGUAL EVERY 5 MIN PRN
Qty: 25 TABLET | Refills: 1 | Status: SHIPPED
Start: 2018-11-06 | End: 2021-09-14 | Stop reason: SDUPTHER

## 2018-11-06 RX ORDER — CLOPIDOGREL BISULFATE 75 MG/1
75 TABLET ORAL DAILY
Qty: 90 TABLET | Refills: 5 | Status: SHIPPED | OUTPATIENT
Start: 2018-11-07 | End: 2019-01-08 | Stop reason: SDUPTHER

## 2018-11-06 RX ORDER — AMLODIPINE BESYLATE 5 MG/1
5 TABLET ORAL DAILY
Qty: 30 TABLET | Refills: 5 | Status: SHIPPED | OUTPATIENT
Start: 2018-11-06 | End: 2019-01-08 | Stop reason: SDUPTHER

## 2018-11-06 NOTE — PATIENT INSTRUCTIONS
yellow fruits and vegetables are especially good for you. Examples include spinach, carrots, peaches, and berries.     · Foods high in fiber can reduce your cholesterol and provide important vitamins and minerals. High-fiber foods include whole-grain cereals and breads, oatmeal, beans, brown rice, citrus fruits, and apples.     · Limit drinks and foods with added sugar. These include candy, desserts, and soda pop.    Lifestyle changes    · If your doctor recommends it, get more exercise. Walking is a good choice. Bit by bit, increase the amount you walk every day. Try for at least 30 minutes on most days of the week. You also may want to swim, bike, or do other activities.     · Do not smoke. If you need help quitting, talk to your doctor about stop-smoking programs and medicines. These can increase your chances of quitting for good. Quitting smoking may be the most important step you can take to protect your heart. It is never too late to quit. You will get health benefits right away.     · Limit alcohol to 2 drinks a day for men and 1 drink a day for women. Too much alcohol can cause health problems. Medicines    · Take your medicines exactly as prescribed. Call your doctor if you think you are having a problem with your medicine.     · If your doctor recommends aspirin, take the amount directed each day. Make sure you take aspirin and not another kind of pain reliever, such as acetaminophen (Tylenol). If you take ibuprofen (such as Advil or Motrin) for other problems, take aspirin at least 2 hours before taking ibuprofen. When should you call for help? Call 911 if you have symptoms of a heart attack.  These may include:    · Chest pain or pressure, or a strange feeling in the chest.     · Sweating.     · Shortness of breath.     · Pain, pressure, or a strange feeling in the back, neck, jaw, or upper belly or in one or both shoulders or arms.     · Lightheadedness or sudden weakness.     · A fast or irregular

## 2018-11-06 NOTE — PROGRESS NOTES
Component Value Date    PROBNP 283 (H) 10/23/2018         ASSESSMENT / DIAGNOSIS:   1. Coronary artery disease. Now with dyspnea with exertion,No signs of volume overload, consider Brilinta as an etiology but will rule out anemia and CHF  2. Hypertension, poorly controlled  3. Acute renal insufficiency     TREATMENT PLAN: Discussed with Dr. Solo Santiago    1. Add amlodipine 5 mg daily  2. Imdur 30 mg daily  3. Continue lisinopril And hydrochlorothiazide  4. Okay for cardiac rehabilitation  5. Pro BNP, CBC, BMP  6. Stop Brilinta after this evening dose  7. Plavix 75 mg daily starting tomorrow  8. We discussed the need for ongoing DAPT  9. Return to see Marleny Parmar in 3 months and consider PCI that time or sooner if he has chest pain  10. He will call us with his blood pressure obtained at primary care on Friday      The patient's current medication list, allergies, problem list and results of all previously ordered tests were reviewed at today's visit      Emilie Ruelas, STEVE - CNP        [address] [place], [place] [id number]  [phone]

## 2018-11-09 ENCOUNTER — HOSPITAL ENCOUNTER (OUTPATIENT)
Age: 68
Discharge: HOME | End: 2018-11-09
Payer: COMMERCIAL

## 2018-11-09 ENCOUNTER — TELEPHONE (OUTPATIENT)
Dept: CARDIOLOGY CLINIC | Age: 68
End: 2018-11-09

## 2018-11-09 ENCOUNTER — OFFICE VISIT (OUTPATIENT)
Dept: FAMILY MEDICINE CLINIC | Age: 68
End: 2018-11-09
Payer: COMMERCIAL

## 2018-11-09 VITALS
HEART RATE: 78 BPM | HEIGHT: 70 IN | TEMPERATURE: 98.3 F | OXYGEN SATURATION: 97 % | WEIGHT: 217.8 LBS | RESPIRATION RATE: 18 BRPM | DIASTOLIC BLOOD PRESSURE: 80 MMHG | SYSTOLIC BLOOD PRESSURE: 130 MMHG | BODY MASS INDEX: 31.18 KG/M2

## 2018-11-09 DIAGNOSIS — Z59.9 FINANCIAL DIFFICULTIES: ICD-10-CM

## 2018-11-09 DIAGNOSIS — I10 ESSENTIAL HYPERTENSION: Primary | ICD-10-CM

## 2018-11-09 DIAGNOSIS — R79.89 ELEVATED SERUM CREATININE: ICD-10-CM

## 2018-11-09 LAB
ANION GAP SERPL CALCULATED.3IONS-SCNC: 11 MMOL/L (ref 7–16)
BUN BLDV-MCNC: 19 MG/DL (ref 8–23)
CALCIUM SERPL-MCNC: 9.8 MG/DL (ref 8.6–10.2)
CHLORIDE BLD-SCNC: 106 MMOL/L (ref 98–107)
CO2: 25 MMOL/L (ref 22–29)
CREAT SERPL-MCNC: 1.5 MG/DL (ref 0.7–1.2)
GFR AFRICAN AMERICAN: 56
GFR NON-AFRICAN AMERICAN: 46 ML/MIN/1.73
GLUCOSE BLD-MCNC: 115 MG/DL (ref 74–99)
HCT VFR BLD CALC: 34.8 % (ref 37–54)
HEMOGLOBIN: 11.2 G/DL (ref 12.5–16.5)
MCH RBC QN AUTO: 31.3 PG (ref 26–35)
MCHC RBC AUTO-ENTMCNC: 32.2 % (ref 32–34.5)
MCV RBC AUTO: 97.2 FL (ref 80–99.9)
PDW BLD-RTO: 12.3 FL (ref 11.5–15)
PLATELET # BLD: 258 E9/L (ref 130–450)
PMV BLD AUTO: 9 FL (ref 7–12)
POTASSIUM SERPL-SCNC: 4.8 MMOL/L (ref 3.5–5)
PRO-BNP: 559 PG/ML (ref 0–125)
RBC # BLD: 3.58 E12/L (ref 3.8–5.8)
SODIUM BLD-SCNC: 142 MMOL/L (ref 132–146)
WBC # BLD: 7.5 E9/L (ref 4.5–11.5)

## 2018-11-09 PROCEDURE — 80048 BASIC METABOLIC PNL TOTAL CA: CPT

## 2018-11-09 PROCEDURE — 99213 OFFICE O/P EST LOW 20 MIN: CPT | Performed by: FAMILY MEDICINE

## 2018-11-09 PROCEDURE — 83880 ASSAY OF NATRIURETIC PEPTIDE: CPT

## 2018-11-09 PROCEDURE — 36415 COLL VENOUS BLD VENIPUNCTURE: CPT

## 2018-11-09 PROCEDURE — 85027 COMPLETE CBC AUTOMATED: CPT

## 2018-11-09 RX ORDER — LISINOPRIL 20 MG/1
20 TABLET ORAL DAILY
COMMUNITY
End: 2018-11-09

## 2018-11-09 SDOH — ECONOMIC STABILITY - INCOME SECURITY: PROBLEM RELATED TO HOUSING AND ECONOMIC CIRCUMSTANCES, UNSPECIFIED: Z59.9

## 2018-11-09 NOTE — PROGRESS NOTES
Subjective:     Patient: Calderon Grajeda is a 76 y.o. male    CC:Hypertension (5 day f/u)    HPI Financial difficulties due to cost of mediction, recent hospitalization, ED visits. Dfficulty affording food. [place] changed to plavix by cardiology. Checking BP at home. Highest 170/80's, typically within goal.  Checks after watching the news walkd to table and checks BP. Denies CP, palp, SOB, cough. Brought meter to check calibration. Review of Systems  See HPI for additional ROS    Allergies   Allergen Reactions    Lisinopril      Resulted in Cr increase over 60 percent     Current Outpatient Prescriptions on File Prior to Visit   Medication Sig Dispense Refill    clopidogrel (PLAVIX) 75 MG tablet Take 1 tablet by mouth daily 90 tablet 5    amLODIPine (NORVASC) 5 MG tablet Take 1 tablet by mouth daily 30 tablet 5    isosorbide mononitrate (IMDUR) 30 MG extended release tablet Take 1 tablet by mouth daily 30 tablet 5    nitroGLYCERIN (NITROSTAT) 0.4 MG SL tablet Place 1 tablet under the tongue every 5 minutes as needed for Chest pain 25 tablet 1    hydrochlorothiazide (HYDRODIURIL) 12.5 MG tablet Take 1 tablet by mouth daily 30 tablet 3    aspirin 81 MG chewable tablet Take 1 tablet by mouth daily 30 tablet 3    atorvastatin (LIPITOR) 80 MG tablet Take 1 tablet by mouth nightly 30 tablet 3    metoprolol succinate (TOPROL XL) 25 MG extended release tablet Take 1 tablet by mouth daily 30 tablet 3    famotidine (PEPCID) 20 MG tablet Take 1 tablet by mouth 2 times daily 60 tablet 3    Blood Pressure Monitoring (B-D ASSURE BPM/AUTO ARM CUFF) MISC Use daily 1 each 0     No current facility-administered medications on file prior to visit.        Past Medical History:   Diagnosis Date    CAD (coronary artery disease)     Hypertension     STEMI (ST elevation myocardial infarction) ([facility] [address] 10/23/2018     Past Surgical History:   Procedure Laterality Date    CORONARY ANGIOPLASTY WITH STENT PLACEMENT

## 2018-11-26 ENCOUNTER — OFFICE VISIT (OUTPATIENT)
Dept: FAMILY MEDICINE CLINIC | Age: 68
End: 2018-11-26
Payer: COMMERCIAL

## 2018-11-26 VITALS
HEIGHT: 70 IN | TEMPERATURE: 98.7 F | OXYGEN SATURATION: 96 % | BODY MASS INDEX: 30.78 KG/M2 | DIASTOLIC BLOOD PRESSURE: 82 MMHG | SYSTOLIC BLOOD PRESSURE: 136 MMHG | RESPIRATION RATE: 16 BRPM | HEART RATE: 66 BPM | WEIGHT: 215 LBS

## 2018-11-26 DIAGNOSIS — I10 ESSENTIAL HYPERTENSION: Primary | ICD-10-CM

## 2018-11-26 PROCEDURE — 99213 OFFICE O/P EST LOW 20 MIN: CPT | Performed by: FAMILY MEDICINE

## 2018-11-26 RX ORDER — ASPIRIN 81 MG/1
81 TABLET, CHEWABLE ORAL DAILY
Qty: 30 TABLET | Refills: 3 | Status: SHIPPED | OUTPATIENT
Start: 2018-11-26

## 2018-11-26 ASSESSMENT — ENCOUNTER SYMPTOMS
WHEEZING: 0
COUGH: 0
SHORTNESS OF BREATH: 0

## 2019-01-08 RX ORDER — AMLODIPINE BESYLATE 5 MG/1
5 TABLET ORAL DAILY
Qty: 90 TABLET | Refills: 3 | Status: SHIPPED | OUTPATIENT
Start: 2019-01-08 | End: 2019-02-26 | Stop reason: SDUPTHER

## 2019-01-08 RX ORDER — ISOSORBIDE MONONITRATE 30 MG/1
30 TABLET, EXTENDED RELEASE ORAL DAILY
Qty: 90 TABLET | Refills: 3 | Status: SHIPPED | OUTPATIENT
Start: 2019-01-08 | End: 2020-01-21 | Stop reason: SDUPTHER

## 2019-01-08 RX ORDER — CLOPIDOGREL BISULFATE 75 MG/1
75 TABLET ORAL DAILY
Qty: 90 TABLET | Refills: 3 | Status: SHIPPED | OUTPATIENT
Start: 2019-01-08 | End: 2020-01-21 | Stop reason: SDUPTHER

## 2019-01-08 RX ORDER — METOPROLOL SUCCINATE 25 MG/1
25 TABLET, EXTENDED RELEASE ORAL DAILY
Qty: 90 TABLET | Refills: 3 | Status: SHIPPED | OUTPATIENT
Start: 2019-01-08 | End: 2020-01-21 | Stop reason: SDUPTHER

## 2019-01-08 RX ORDER — ATORVASTATIN CALCIUM 80 MG/1
80 TABLET, FILM COATED ORAL NIGHTLY
Qty: 90 TABLET | Refills: 3 | Status: SHIPPED | OUTPATIENT
Start: 2019-01-08 | End: 2020-01-21 | Stop reason: SDUPTHER

## 2019-01-08 RX ORDER — FAMOTIDINE 20 MG/1
20 TABLET, FILM COATED ORAL 2 TIMES DAILY
Qty: 180 TABLET | Refills: 3 | Status: SHIPPED | OUTPATIENT
Start: 2019-01-08 | End: 2020-01-21 | Stop reason: SDUPTHER

## 2019-01-21 ENCOUNTER — HOSPITAL ENCOUNTER (OUTPATIENT)
Age: 69
Discharge: HOME | End: 2019-01-21
Payer: COMMERCIAL

## 2019-01-21 LAB
ALBUMIN SERPL-MCNC: 4.3 G/DL (ref 3.5–5.2)
ALP BLD-CCNC: 125 U/L (ref 40–129)
ALT SERPL-CCNC: 10 U/L (ref 0–40)
ANION GAP SERPL CALCULATED.3IONS-SCNC: 12 MMOL/L (ref 7–16)
AST SERPL-CCNC: 11 U/L (ref 0–39)
BASOPHILS ABSOLUTE: 0.02 E9/L (ref 0–0.2)
BASOPHILS RELATIVE PERCENT: 0.3 % (ref 0–2)
BILIRUB SERPL-MCNC: 0.4 MG/DL (ref 0–1.2)
BUN BLDV-MCNC: 22 MG/DL (ref 8–23)
CALCIUM SERPL-MCNC: 10 MG/DL (ref 8.6–10.2)
CHLORIDE BLD-SCNC: 104 MMOL/L (ref 98–107)
CHOLESTEROL, TOTAL: 103 MG/DL (ref 0–199)
CO2: 27 MMOL/L (ref 22–29)
CREAT SERPL-MCNC: 1.6 MG/DL (ref 0.7–1.2)
EOSINOPHILS ABSOLUTE: 0.13 E9/L (ref 0.05–0.5)
EOSINOPHILS RELATIVE PERCENT: 1.7 % (ref 0–6)
FERRITIN: 110 NG/ML
GFR AFRICAN AMERICAN: 52
GFR NON-AFRICAN AMERICAN: 43 ML/MIN/1.73
GLUCOSE BLD-MCNC: 116 MG/DL (ref 74–99)
HBA1C MFR BLD: 6.2 % (ref 4–5.6)
HCT VFR BLD CALC: 41.1 % (ref 37–54)
HDLC SERPL-MCNC: 41 MG/DL
HEMOGLOBIN: 13.3 G/DL (ref 12.5–16.5)
IMMATURE GRANULOCYTES #: 0.03 E9/L
IMMATURE GRANULOCYTES %: 0.4 % (ref 0–5)
IRON SATURATION: 30 % (ref 20–55)
IRON: 82 MCG/DL (ref 59–158)
LDL CHOLESTEROL CALCULATED: 33 MG/DL (ref 0–99)
LYMPHOCYTES ABSOLUTE: 2.67 E9/L (ref 1.5–4)
LYMPHOCYTES RELATIVE PERCENT: 35.8 % (ref 20–42)
MCH RBC QN AUTO: 29.8 PG (ref 26–35)
MCHC RBC AUTO-ENTMCNC: 32.4 % (ref 32–34.5)
MCV RBC AUTO: 91.9 FL (ref 80–99.9)
MONOCYTES ABSOLUTE: 0.54 E9/L (ref 0.1–0.95)
MONOCYTES RELATIVE PERCENT: 7.2 % (ref 2–12)
NEUTROPHILS ABSOLUTE: 4.07 E9/L (ref 1.8–7.3)
NEUTROPHILS RELATIVE PERCENT: 54.6 % (ref 43–80)
PDW BLD-RTO: 12.2 FL (ref 11.5–15)
PLATELET # BLD: 210 E9/L (ref 130–450)
PMV BLD AUTO: 9.1 FL (ref 7–12)
POTASSIUM SERPL-SCNC: 4.3 MMOL/L (ref 3.5–5)
RBC # BLD: 4.47 E12/L (ref 3.8–5.8)
SEDIMENTATION RATE, ERYTHROCYTE: 25 MM/HR (ref 0–15)
SODIUM BLD-SCNC: 143 MMOL/L (ref 132–146)
TOTAL IRON BINDING CAPACITY: 270 MCG/DL (ref 250–450)
TOTAL PROTEIN: 7.4 G/DL (ref 6.4–8.3)
TRIGL SERPL-MCNC: 145 MG/DL (ref 0–149)
VLDLC SERPL CALC-MCNC: 29 MG/DL
WBC # BLD: 7.5 E9/L (ref 4.5–11.5)

## 2019-01-21 PROCEDURE — 84156 ASSAY OF PROTEIN URINE: CPT

## 2019-01-21 PROCEDURE — 83550 IRON BINDING TEST: CPT

## 2019-01-21 PROCEDURE — 82728 ASSAY OF FERRITIN: CPT

## 2019-01-21 PROCEDURE — 80053 COMPREHEN METABOLIC PANEL: CPT

## 2019-01-21 PROCEDURE — 85025 COMPLETE CBC W/AUTO DIFF WBC: CPT

## 2019-01-21 PROCEDURE — 36415 COLL VENOUS BLD VENIPUNCTURE: CPT

## 2019-01-21 PROCEDURE — 82575 CREATININE CLEARANCE TEST: CPT

## 2019-01-21 PROCEDURE — 85651 RBC SED RATE NONAUTOMATED: CPT

## 2019-01-21 PROCEDURE — 83036 HEMOGLOBIN GLYCOSYLATED A1C: CPT

## 2019-01-21 PROCEDURE — 84133 ASSAY OF URINE POTASSIUM: CPT

## 2019-01-21 PROCEDURE — 80061 LIPID PANEL: CPT

## 2019-01-21 PROCEDURE — 84300 ASSAY OF URINE SODIUM: CPT

## 2019-01-21 PROCEDURE — 83540 ASSAY OF IRON: CPT

## 2019-01-23 ENCOUNTER — HOSPITAL ENCOUNTER (OUTPATIENT)
Dept: ULTRASOUND IMAGING | Age: 69
Discharge: HOME | End: 2019-01-25
Payer: COMMERCIAL

## 2019-01-23 DIAGNOSIS — I10 HYPERTENSION, UNSPECIFIED TYPE: ICD-10-CM

## 2019-01-23 DIAGNOSIS — R79.89 ELEVATED SERUM CREATININE: ICD-10-CM

## 2019-01-23 PROCEDURE — 76770 US EXAM ABDO BACK WALL COMP: CPT

## 2019-01-23 PROCEDURE — 93975 VASCULAR STUDY: CPT

## 2019-01-28 LAB
24HR URINE VOLUME (ML): 3200 ML
CREAT SERPL-MCNC: 1.6 MG/DL (ref 0.7–1.2)
CREATININE 24 HOUR URINE: 1968 MG/24H (ref 980–2200)
CREATININE CLEARANCE: 85.4 ML/MIN (ref 65–123)
Lab: 16 HOURS
POTASSIUM 24 HOUR URINE: 106 MMOL/24H (ref 25–125)
PROTEIN 24 HOUR URINE: 0.19 G/24HR (ref 0–0.14)
SODIUM 24 HOUR URINE: 302 MMOL/24 HR (ref 40–220)

## 2019-02-26 ENCOUNTER — OFFICE VISIT (OUTPATIENT)
Dept: FAMILY MEDICINE CLINIC | Age: 69
End: 2019-02-26
Payer: COMMERCIAL

## 2019-02-26 VITALS
DIASTOLIC BLOOD PRESSURE: 86 MMHG | SYSTOLIC BLOOD PRESSURE: 146 MMHG | HEART RATE: 74 BPM | RESPIRATION RATE: 16 BRPM | OXYGEN SATURATION: 96 % | WEIGHT: 223 LBS | HEIGHT: 70 IN | BODY MASS INDEX: 31.92 KG/M2 | TEMPERATURE: 96.1 F

## 2019-02-26 DIAGNOSIS — N18.30 CKD (CHRONIC KIDNEY DISEASE), STAGE III (HCC): ICD-10-CM

## 2019-02-26 DIAGNOSIS — I25.10 CORONARY ARTERY DISEASE INVOLVING NATIVE CORONARY ARTERY OF NATIVE HEART WITHOUT ANGINA PECTORIS: ICD-10-CM

## 2019-02-26 DIAGNOSIS — Z12.11 SCREEN FOR COLON CANCER: ICD-10-CM

## 2019-02-26 DIAGNOSIS — I10 ESSENTIAL HYPERTENSION: Primary | ICD-10-CM

## 2019-02-26 PROCEDURE — 99214 OFFICE O/P EST MOD 30 MIN: CPT | Performed by: FAMILY MEDICINE

## 2019-02-26 RX ORDER — LISINOPRIL 20 MG/1
TABLET ORAL
COMMUNITY
Start: 2019-01-31 | End: 2019-03-06

## 2019-02-26 RX ORDER — AMLODIPINE BESYLATE 10 MG/1
10 TABLET ORAL DAILY
Qty: 90 TABLET | Refills: 0 | Status: SHIPPED | OUTPATIENT
Start: 2019-02-26 | End: 2019-05-15 | Stop reason: SDUPTHER

## 2019-02-26 ASSESSMENT — PATIENT HEALTH QUESTIONNAIRE - PHQ9
SUM OF ALL RESPONSES TO PHQ QUESTIONS 1-9: 0
SUM OF ALL RESPONSES TO PHQ9 QUESTIONS 1 & 2: 0
1. LITTLE INTEREST OR PLEASURE IN DOING THINGS: 0
2. FEELING DOWN, DEPRESSED OR HOPELESS: 0
SUM OF ALL RESPONSES TO PHQ QUESTIONS 1-9: 0

## 2019-03-06 ENCOUNTER — OFFICE VISIT (OUTPATIENT)
Dept: CARDIOLOGY CLINIC | Age: 69
End: 2019-03-06
Payer: COMMERCIAL

## 2019-03-06 VITALS
RESPIRATION RATE: 20 BRPM | BODY MASS INDEX: 32.21 KG/M2 | HEART RATE: 86 BPM | SYSTOLIC BLOOD PRESSURE: 140 MMHG | DIASTOLIC BLOOD PRESSURE: 90 MMHG | WEIGHT: 225 LBS | HEIGHT: 70 IN

## 2019-03-06 DIAGNOSIS — I25.2 HISTORY OF ACUTE INFERIOR WALL MYOCARDIAL INFARCTION: ICD-10-CM

## 2019-03-06 DIAGNOSIS — R07.9 CHEST PAIN, UNSPECIFIED TYPE: Primary | ICD-10-CM

## 2019-03-06 DIAGNOSIS — N18.30 CKD (CHRONIC KIDNEY DISEASE) STAGE 3, GFR 30-59 ML/MIN (HCC): ICD-10-CM

## 2019-03-06 DIAGNOSIS — T46.4X5A ACE INHIBITOR NEPHROTOXICITY: ICD-10-CM

## 2019-03-06 DIAGNOSIS — Z98.61 HISTORY OF PTCA: ICD-10-CM

## 2019-03-06 DIAGNOSIS — I10 ESSENTIAL HYPERTENSION: ICD-10-CM

## 2019-03-06 DIAGNOSIS — I25.10 CORONARY ARTERY DISEASE INVOLVING NATIVE CORONARY ARTERY OF NATIVE HEART WITHOUT ANGINA PECTORIS: ICD-10-CM

## 2019-03-06 DIAGNOSIS — N14.19 ACE INHIBITOR NEPHROTOXICITY: ICD-10-CM

## 2019-03-06 PROCEDURE — 93000 ELECTROCARDIOGRAM COMPLETE: CPT | Performed by: INTERNAL MEDICINE

## 2019-03-06 PROCEDURE — 99214 OFFICE O/P EST MOD 30 MIN: CPT | Performed by: INTERNAL MEDICINE

## 2019-03-12 ENCOUNTER — NURSE ONLY (OUTPATIENT)
Dept: FAMILY MEDICINE CLINIC | Age: 69
End: 2019-03-12

## 2019-03-12 ENCOUNTER — TELEPHONE (OUTPATIENT)
Dept: FAMILY MEDICINE CLINIC | Age: 69
End: 2019-03-12

## 2019-03-12 VITALS — OXYGEN SATURATION: 97 % | HEART RATE: 70 BPM | DIASTOLIC BLOOD PRESSURE: 82 MMHG | SYSTOLIC BLOOD PRESSURE: 134 MMHG

## 2019-03-12 DIAGNOSIS — I10 ESSENTIAL HYPERTENSION: Primary | ICD-10-CM

## 2019-03-18 ENCOUNTER — HOSPITAL ENCOUNTER (OUTPATIENT)
Dept: CARDIOLOGY | Age: 69
Discharge: HOME | End: 2019-03-18
Payer: COMMERCIAL

## 2019-03-18 VITALS
SYSTOLIC BLOOD PRESSURE: 140 MMHG | HEIGHT: 70 IN | DIASTOLIC BLOOD PRESSURE: 70 MMHG | HEART RATE: 80 BPM | BODY MASS INDEX: 32.21 KG/M2 | WEIGHT: 225 LBS

## 2019-03-18 DIAGNOSIS — R07.9 CHEST PAIN, UNSPECIFIED TYPE: ICD-10-CM

## 2019-03-18 DIAGNOSIS — I25.10 CORONARY ARTERY DISEASE INVOLVING NATIVE CORONARY ARTERY OF NATIVE HEART WITHOUT ANGINA PECTORIS: ICD-10-CM

## 2019-03-18 PROCEDURE — 78452 HT MUSCLE IMAGE SPECT MULT: CPT

## 2019-03-18 PROCEDURE — A9502 TC99M TETROFOSMIN: HCPCS | Performed by: INTERNAL MEDICINE

## 2019-03-18 PROCEDURE — 2580000003 HC RX 258: Performed by: INTERNAL MEDICINE

## 2019-03-18 PROCEDURE — 3430000000 HC RX DIAGNOSTIC RADIOPHARMACEUTICAL: Performed by: INTERNAL MEDICINE

## 2019-03-18 PROCEDURE — 93017 CV STRESS TEST TRACING ONLY: CPT

## 2019-03-18 PROCEDURE — 78452 HT MUSCLE IMAGE SPECT MULT: CPT | Performed by: INTERNAL MEDICINE

## 2019-03-18 PROCEDURE — 6360000002 HC RX W HCPCS: Performed by: INTERNAL MEDICINE

## 2019-03-18 RX ORDER — SODIUM CHLORIDE 0.9 % (FLUSH) 0.9 %
10 SYRINGE (ML) INJECTION PRN
Status: DISCONTINUED | OUTPATIENT
Start: 2019-03-18 | End: 2019-03-19 | Stop reason: HOSPADM

## 2019-03-18 RX ADMIN — TETROFOSMIN 25 MILLICURIE: 0.23 INJECTION, POWDER, LYOPHILIZED, FOR SOLUTION INTRAVENOUS at 10:05

## 2019-03-18 RX ADMIN — REGADENOSON 0.4 MG: 0.08 INJECTION, SOLUTION INTRAVENOUS at 10:05

## 2019-03-18 RX ADMIN — Medication 10 ML: at 10:00

## 2019-03-18 RX ADMIN — Medication 10 ML: at 08:39

## 2019-03-18 RX ADMIN — Medication 10 ML: at 10:05

## 2019-03-18 RX ADMIN — TETROFOSMIN 8 MILLICURIE: 0.23 INJECTION, POWDER, LYOPHILIZED, FOR SOLUTION INTRAVENOUS at 08:39

## 2019-03-21 ENCOUNTER — TELEPHONE (OUTPATIENT)
Dept: CARDIOLOGY CLINIC | Age: 69
End: 2019-03-21

## 2019-04-04 ENCOUNTER — OFFICE VISIT (OUTPATIENT)
Dept: FAMILY MEDICINE CLINIC | Age: 69
End: 2019-04-04
Payer: COMMERCIAL

## 2019-04-04 VITALS
TEMPERATURE: 97.1 F | HEIGHT: 70 IN | SYSTOLIC BLOOD PRESSURE: 132 MMHG | BODY MASS INDEX: 32.64 KG/M2 | DIASTOLIC BLOOD PRESSURE: 88 MMHG | HEART RATE: 71 BPM | OXYGEN SATURATION: 97 % | WEIGHT: 228 LBS | RESPIRATION RATE: 16 BRPM

## 2019-04-04 DIAGNOSIS — I10 ESSENTIAL HYPERTENSION: ICD-10-CM

## 2019-04-04 DIAGNOSIS — I25.10 CORONARY ARTERY DISEASE INVOLVING NATIVE CORONARY ARTERY OF NATIVE HEART WITHOUT ANGINA PECTORIS: ICD-10-CM

## 2019-04-04 DIAGNOSIS — N18.30 CKD (CHRONIC KIDNEY DISEASE), STAGE III (HCC): Primary | ICD-10-CM

## 2019-04-04 PROCEDURE — 99214 OFFICE O/P EST MOD 30 MIN: CPT | Performed by: FAMILY MEDICINE

## 2019-04-04 NOTE — PROGRESS NOTES
Progress Note    Subjective: Zander Greene is a 71y.o. year old male here for HTN, CAD, CKD. Health Maintenance Due   Topic Date Due    Colon cancer screen colonoscopy  01/11/2000    Pneumococcal 65+ years Vaccine (1 of 2 - PCV13) 01/11/2015       Taking amlodipine, metoprolol and imdur for HTN. BP  controlled. No CP, palpitations, blurred vision, HA, lightheadedness, LOC or focal neurological deficits. For CAD follows w/ Cards who was recently concerned re CP so got stress test. CP lasts seconds only. Stress showed: Impression:    1. Electrocardiographically normal regadenoson infusion with a   clinicallynonischemic response. 2. Myocardial perfusion imaging was abnormal.  The abnormality   was a a moderate sized fixed defect in the inferior wall   suggestive of a prior MI\  3.   This was a non gated study  4.   Low risk general pharmacologic stress test  No further CP. Since the MI has had DELATORRE, but stable. Goes back to the [facility] doctor in June. Blood test ordered for prior to then. Still making urine. No hematuria. Declined pneumonia shot. Does not believe in vaccines. Counseling provided.        Past Medical History:   Diagnosis Date    CAD (coronary artery disease)     Hypertension     STEMI (ST elevation myocardial infarction) ([facility] [address] 10/23/2018     Past Surgical History:   Procedure Laterality Date    CORONARY ANGIOPLASTY WITH STENT PLACEMENT  10/23/2018    3.5 x 26 Resolute [place] to mid RCA by Dr. Teodora Naranjo     Family History   Problem Relation Age of Onset    High Blood Pressure Mother     Heart Disease Mother     Dementia Mother     Diabetes Mother     Cancer Father 62        lung, tongue, brain    Heart Attack Sister     Heart Attack Brother      Social History     Socioeconomic History    Marital status:      Spouse name: Not on file    Number of children: Not on file    Years of education: Not on file    Highest education level: Not on file   Occupational History    Not on file   Social Needs    Financial resource strain: Not on file    Food insecurity:     Worry: Not on file     Inability: Not on file    Transportation needs:     Medical: Not on file     Non-medical: Not on file   Tobacco Use    Smoking status: Never Smoker    Smokeless tobacco: Never Used   Substance and Sexual Activity    Alcohol use: No     Comment: no alcohol x 50 years    Drug use: No    Sexual activity: Not on file   Lifestyle    Physical activity:     Days per week: Not on file     Minutes per session: Not on file    Stress: Not on file   Relationships    Social connections:     Talks on phone: Not on file     Gets together: Not on file     Attends Sabianism service: Not on file     Active member of club or organization: Not on file     Attends meetings of clubs or organizations: Not on file     Relationship status: Not on file    Intimate partner violence:     Fear of current or ex partner: Not on file     Emotionally abused: Not on file     Physically abused: Not on file     Forced sexual activity: Not on file   Other Topics Concern    Not on file   Social History Narrative    Drinks 2 cups of coffee daily.        Review Of Systems (bold are positive, all else are negative)  Gen: No fevers, sweats, chills  No fatigue  No unintentional weight changes  Skin: No rash, no lesion  Eyes: No vision changes  No eye itching  ENT: No earache, no drainage  No nasal congestion  No sinus tenderness  No sore throat  No swallowing issues  Resp: No cough, shortness of breath, wheeze, chest congestion  CV: No chest pain, palpitation, edema  GI: No abdominal pain  No nausea, no vomiting  No change in bowels, no diarrhea or constipation  No blood in stool or blood per rectum      Objective:  /88 (Site: Right Upper Arm, Position: Sitting, Cuff Size: Large Adult)   Pulse 71   Temp 97.1 °F (36.2 °C) (Temporal)   Resp 16   Ht 5' 10\" (1.778 m)   Wt 228 lb (103.4 kg)   SpO2 97%   BMI 32.71

## 2019-04-04 NOTE — PATIENT INSTRUCTIONS
If your blood pressure goes high again, the first thing we'll increase is the metoprolol. This may help with the shortness of breath brought on by exertion given you have that fixed defect in your heart. Follow up with the kidney doctor as planned. Continue the amlodipine and stay hydrated to protect your kidneys. Avoid ibuprofen or medicines like it. Tylenol is ok.

## 2019-05-15 DIAGNOSIS — I10 ESSENTIAL HYPERTENSION: ICD-10-CM

## 2019-05-15 RX ORDER — AMLODIPINE BESYLATE 10 MG/1
10 TABLET ORAL DAILY
Qty: 90 TABLET | Refills: 1 | Status: SHIPPED | OUTPATIENT
Start: 2019-05-15 | End: 2019-10-04 | Stop reason: SDUPTHER

## 2019-06-03 ENCOUNTER — HOSPITAL ENCOUNTER (OUTPATIENT)
Age: 69
Discharge: HOME | End: 2019-06-03
Payer: COMMERCIAL

## 2019-06-03 LAB
ALBUMIN SERPL-MCNC: 4.2 G/DL (ref 3.5–5.2)
ALP BLD-CCNC: 131 U/L (ref 40–129)
ALT SERPL-CCNC: 11 U/L (ref 0–40)
ANION GAP SERPL CALCULATED.3IONS-SCNC: 10 MMOL/L (ref 7–16)
AST SERPL-CCNC: 13 U/L (ref 0–39)
BASOPHILS ABSOLUTE: 0.02 E9/L (ref 0–0.2)
BASOPHILS RELATIVE PERCENT: 0.3 % (ref 0–2)
BILIRUB SERPL-MCNC: 0.4 MG/DL (ref 0–1.2)
BUN BLDV-MCNC: 22 MG/DL (ref 8–23)
CALCIUM SERPL-MCNC: 9.3 MG/DL (ref 8.6–10.2)
CHLORIDE BLD-SCNC: 107 MMOL/L (ref 98–107)
CHOLESTEROL, TOTAL: 100 MG/DL (ref 0–199)
CO2: 24 MMOL/L (ref 22–29)
CREAT SERPL-MCNC: 1.5 MG/DL (ref 0.7–1.2)
EOSINOPHILS ABSOLUTE: 0.1 E9/L (ref 0.05–0.5)
EOSINOPHILS RELATIVE PERCENT: 1.3 % (ref 0–6)
GFR AFRICAN AMERICAN: 56
GFR NON-AFRICAN AMERICAN: 46 ML/MIN/1.73
GLUCOSE BLD-MCNC: 117 MG/DL (ref 74–99)
HBA1C MFR BLD: 6.1 % (ref 4–5.6)
HCT VFR BLD CALC: 39.9 % (ref 37–54)
HDLC SERPL-MCNC: 42 MG/DL
HEMOGLOBIN: 13 G/DL (ref 12.5–16.5)
IMMATURE GRANULOCYTES #: 0.04 E9/L
IMMATURE GRANULOCYTES %: 0.5 % (ref 0–5)
LDL CHOLESTEROL CALCULATED: 37 MG/DL (ref 0–99)
LYMPHOCYTES ABSOLUTE: 2.2 E9/L (ref 1.5–4)
LYMPHOCYTES RELATIVE PERCENT: 28.5 % (ref 20–42)
MCH RBC QN AUTO: 30 PG (ref 26–35)
MCHC RBC AUTO-ENTMCNC: 32.6 % (ref 32–34.5)
MCV RBC AUTO: 92.1 FL (ref 80–99.9)
MONOCYTES ABSOLUTE: 0.63 E9/L (ref 0.1–0.95)
MONOCYTES RELATIVE PERCENT: 8.2 % (ref 2–12)
NEUTROPHILS ABSOLUTE: 4.72 E9/L (ref 1.8–7.3)
NEUTROPHILS RELATIVE PERCENT: 61.2 % (ref 43–80)
PDW BLD-RTO: 12.6 FL (ref 11.5–15)
PLATELET # BLD: 191 E9/L (ref 130–450)
PMV BLD AUTO: 9.7 FL (ref 7–12)
POTASSIUM SERPL-SCNC: 4.9 MMOL/L (ref 3.5–5)
RBC # BLD: 4.33 E12/L (ref 3.8–5.8)
SEDIMENTATION RATE, ERYTHROCYTE: 16 MM/HR (ref 0–15)
SODIUM BLD-SCNC: 141 MMOL/L (ref 132–146)
TOTAL PROTEIN: 7.4 G/DL (ref 6.4–8.3)
TRIGL SERPL-MCNC: 103 MG/DL (ref 0–149)
URIC ACID, SERUM: 7.5 MG/DL (ref 3.4–7)
VLDLC SERPL CALC-MCNC: 21 MG/DL
WBC # BLD: 7.7 E9/L (ref 4.5–11.5)

## 2019-06-03 PROCEDURE — 85651 RBC SED RATE NONAUTOMATED: CPT

## 2019-06-03 PROCEDURE — 85025 COMPLETE CBC W/AUTO DIFF WBC: CPT

## 2019-06-03 PROCEDURE — 80053 COMPREHEN METABOLIC PANEL: CPT

## 2019-06-03 PROCEDURE — 83036 HEMOGLOBIN GLYCOSYLATED A1C: CPT

## 2019-06-03 PROCEDURE — 84550 ASSAY OF BLOOD/URIC ACID: CPT

## 2019-06-03 PROCEDURE — 36415 COLL VENOUS BLD VENIPUNCTURE: CPT

## 2019-06-03 PROCEDURE — 80061 LIPID PANEL: CPT

## 2019-08-23 ENCOUNTER — TELEPHONE (OUTPATIENT)
Dept: PHARMACY | Facility: CLINIC | Age: 69
End: 2019-08-23

## 2019-09-11 ENCOUNTER — CARE COORDINATION (OUTPATIENT)
Dept: CARE COORDINATION | Age: 69
End: 2019-09-11

## 2019-09-20 ENCOUNTER — OFFICE VISIT (OUTPATIENT)
Dept: CARDIOLOGY CLINIC | Age: 69
End: 2019-09-20
Payer: COMMERCIAL

## 2019-09-20 VITALS
OXYGEN SATURATION: 97 % | HEART RATE: 71 BPM | BODY MASS INDEX: 32.64 KG/M2 | SYSTOLIC BLOOD PRESSURE: 140 MMHG | RESPIRATION RATE: 18 BRPM | HEIGHT: 70 IN | DIASTOLIC BLOOD PRESSURE: 80 MMHG | WEIGHT: 228 LBS

## 2019-09-20 DIAGNOSIS — N18.30 CKD (CHRONIC KIDNEY DISEASE) STAGE 3, GFR 30-59 ML/MIN (HCC): ICD-10-CM

## 2019-09-20 DIAGNOSIS — I73.9 PAD (PERIPHERAL ARTERY DISEASE) (HCC): ICD-10-CM

## 2019-09-20 DIAGNOSIS — I10 ESSENTIAL HYPERTENSION: ICD-10-CM

## 2019-09-20 DIAGNOSIS — M17.11 ARTHRITIS OF RIGHT KNEE: ICD-10-CM

## 2019-09-20 DIAGNOSIS — I25.2 HISTORY OF ACUTE INFERIOR WALL MYOCARDIAL INFARCTION: ICD-10-CM

## 2019-09-20 DIAGNOSIS — I25.10 CORONARY ARTERY DISEASE INVOLVING NATIVE CORONARY ARTERY OF NATIVE HEART WITHOUT ANGINA PECTORIS: Primary | ICD-10-CM

## 2019-09-20 DIAGNOSIS — Z98.61 HISTORY OF PTCA: ICD-10-CM

## 2019-09-20 DIAGNOSIS — E66.09 NON MORBID OBESITY DUE TO EXCESS CALORIES: ICD-10-CM

## 2019-09-20 DIAGNOSIS — R73.03 PREDIABETES: ICD-10-CM

## 2019-09-20 PROCEDURE — 93000 ELECTROCARDIOGRAM COMPLETE: CPT | Performed by: INTERNAL MEDICINE

## 2019-09-20 PROCEDURE — 99214 OFFICE O/P EST MOD 30 MIN: CPT | Performed by: INTERNAL MEDICINE

## 2019-09-20 RX ORDER — LISINOPRIL 20 MG/1
20 TABLET ORAL DAILY
COMMUNITY
End: 2021-07-20 | Stop reason: SDUPTHER

## 2019-09-23 NOTE — PROGRESS NOTES
OFFICE VISIT        PRIMARY CARE PHYSICIAN:    Nader Kiran MD       ALLERGIES / SENSITIVITIES:    Allergies   Allergen Reactions    Lisinopril      Resulted in Cr increase over 60 percent        REVIEWED MEDICATIONS:      Current Outpatient Medications:     lisinopril (PRINIVIL;ZESTRIL) 20 MG tablet, Take 20 mg by mouth daily, Disp: , Rfl:     amLODIPine (NORVASC) 10 MG tablet, Take 1 tablet by mouth daily, Disp: 90 tablet, Rfl: 1    atorvastatin (LIPITOR) 80 MG tablet, Take 1 tablet by mouth nightly, Disp: 90 tablet, Rfl: 3    metoprolol succinate (TOPROL XL) 25 MG extended release tablet, Take 1 tablet by mouth daily, Disp: 90 tablet, Rfl: 3    famotidine (PEPCID) 20 MG tablet, Take 1 tablet by mouth 2 times daily, Disp: 180 tablet, Rfl: 3    clopidogrel (PLAVIX) 75 MG tablet, Take 1 tablet by mouth daily, Disp: 90 tablet, Rfl: 3    isosorbide mononitrate (IMDUR) 30 MG extended release tablet, Take 1 tablet by mouth daily, Disp: 90 tablet, Rfl: 3    aspirin 81 MG chewable tablet, Take 1 tablet by mouth daily, Disp: 30 tablet, Rfl: 3    nitroGLYCERIN (NITROSTAT) 0.4 MG SL tablet, Place 1 tablet under the tongue every 5 minutes as needed for Chest pain, Disp: 25 tablet, Rfl: 1      S: REASON FOR VISIT:    Coronary artery disease. Lasha Hanson is a 70-year-old male with cardiovascular history as described below. He has been stable from a cardiac standpoint. He denies chest pain or dyspnea with his daily activities. He denies chest pain or dyspnea with his daily activities. He denies orthopnea, PND's or significant lower extremity swelling. He denies palpitations, dizziness, presyncope or syncope. He complains of right knee arthritis, which affects his walking. He follows up with Nephrology (Dr. Gill Pa). He was started on Lisinopril around 3 months ago. He has a follow up with Dr. Gill Pa in October and is supposed to have a blood test one week prior to his office visit with him.   Of note, the patient has chronic kidney disease and has a history of acute kidney injury with ace inhibitor therapy. Grisel Brink has been monitoring his blood pressure at home and a log of his blood pressure recordings was reviewed, which showed maximum systolic blood pressures in the 130's with maximum diastolic blood pressures in the 80's. REVIEW OF SYSTEMS:    CONSTITUTIONAL:  Denies fevers, chills, or night sweats. He reports easy fatigability. HEENT:  Denies any unusual headaches. He has diminished hearing, but denies any recent changes in vision. Denies dysphagia, hoarseness, hemoptysis, hematemesis or epistaxis. ENDOCRINE:  Denies polyphagia, polydipsia or polyuria. Denies heat or cold intolerance. MUSCULOSKELETAL:  He has right knee arthritic pain. He denies myalgia. SKIN:  Denies rashes, ulcers or itching. HEME/LYMPH:  Denies any palpable lymph nodes, bleeding or easy bruisability. HEART:  As above. LUNGS:  Denies cough or sputum production. GI: Denies abdominal pain, nausea, vomiting, diarrhea, constipation, rectal bleeding or tarry stools. :  Denies hematuria or dysuria. PSYCHIATRIC:  Denies mood changes, anxiety or depression. NEUROLOGIC:  Denies memory loss, motor weakness, numbness, tingling or tremors.                    CARDIOVASCULAR HISTORY:    1. Hypertension  2. Obesity  3. Coronary artery disease:  a.   S/P acute ST elevation inferior wall myocardial infarction on 10/23/2018.    b.  cardiac catheterization and angioplasty, 10/23/2018: Left main, no significant disease. LAD around 20% proximal/mid vessel narrowing. 70-80% disease of the 1st diagonal branch and 90% disease of the 2nd diagonal branch. LCX, 80-90% stenosis of the 1st marginal branch and sequential 60, 80 and 60% stenoses in the mid to distal left circumflex before the large terminal lateral branch. RCA, dominant vessel, which is occluded proximally with 60% distal vessel stenosis.   Elevated left ventricular end

## 2019-09-24 PROBLEM — I73.9 PAD (PERIPHERAL ARTERY DISEASE) (HCC): Status: ACTIVE | Noted: 2019-09-24

## 2019-10-04 ENCOUNTER — OFFICE VISIT (OUTPATIENT)
Dept: FAMILY MEDICINE CLINIC | Age: 69
End: 2019-10-04
Payer: COMMERCIAL

## 2019-10-04 VITALS
RESPIRATION RATE: 16 BRPM | TEMPERATURE: 98.6 F | DIASTOLIC BLOOD PRESSURE: 81 MMHG | WEIGHT: 222 LBS | HEART RATE: 77 BPM | HEIGHT: 70 IN | SYSTOLIC BLOOD PRESSURE: 147 MMHG | BODY MASS INDEX: 31.78 KG/M2

## 2019-10-04 DIAGNOSIS — N18.30 CKD (CHRONIC KIDNEY DISEASE), STAGE III (HCC): ICD-10-CM

## 2019-10-04 DIAGNOSIS — Z12.11 SCREEN FOR COLON CANCER: Primary | ICD-10-CM

## 2019-10-04 DIAGNOSIS — I10 ESSENTIAL HYPERTENSION: ICD-10-CM

## 2019-10-04 DIAGNOSIS — I25.10 CORONARY ARTERY DISEASE INVOLVING NATIVE CORONARY ARTERY OF NATIVE HEART WITHOUT ANGINA PECTORIS: ICD-10-CM

## 2019-10-04 PROCEDURE — 99214 OFFICE O/P EST MOD 30 MIN: CPT | Performed by: FAMILY MEDICINE

## 2019-10-04 RX ORDER — AMLODIPINE BESYLATE 10 MG/1
10 TABLET ORAL DAILY
Qty: 90 TABLET | Refills: 1 | Status: SHIPPED | OUTPATIENT
Start: 2019-10-04 | End: 2020-01-21 | Stop reason: SDUPTHER

## 2019-10-07 ENCOUNTER — HOSPITAL ENCOUNTER (OUTPATIENT)
Age: 69
Discharge: HOME | End: 2019-10-07
Payer: COMMERCIAL

## 2019-10-07 LAB
ALBUMIN SERPL-MCNC: 4 G/DL (ref 3.5–5.2)
ALP BLD-CCNC: 131 U/L (ref 40–129)
ALT SERPL-CCNC: 9 U/L (ref 0–40)
ANION GAP SERPL CALCULATED.3IONS-SCNC: 11 MMOL/L (ref 7–16)
AST SERPL-CCNC: 12 U/L (ref 0–39)
BASOPHILS ABSOLUTE: 0.02 E9/L (ref 0–0.2)
BASOPHILS RELATIVE PERCENT: 0.3 % (ref 0–2)
BILIRUB SERPL-MCNC: 0.5 MG/DL (ref 0–1.2)
BUN BLDV-MCNC: 21 MG/DL (ref 8–23)
CALCIUM SERPL-MCNC: 9.6 MG/DL (ref 8.6–10.2)
CHLORIDE BLD-SCNC: 107 MMOL/L (ref 98–107)
CHOLESTEROL, TOTAL: 106 MG/DL (ref 0–199)
CO2: 23 MMOL/L (ref 22–29)
CREAT SERPL-MCNC: 1.6 MG/DL (ref 0.7–1.2)
EOSINOPHILS ABSOLUTE: 0.08 E9/L (ref 0.05–0.5)
EOSINOPHILS RELATIVE PERCENT: 1.1 % (ref 0–6)
GFR AFRICAN AMERICAN: 52
GFR NON-AFRICAN AMERICAN: 43 ML/MIN/1.73
GLUCOSE BLD-MCNC: 114 MG/DL (ref 74–99)
HBA1C MFR BLD: 6 % (ref 4–5.6)
HCT VFR BLD CALC: 39.2 % (ref 37–54)
HDLC SERPL-MCNC: 40 MG/DL
HEMOGLOBIN: 12.7 G/DL (ref 12.5–16.5)
IMMATURE GRANULOCYTES #: 0.01 E9/L
IMMATURE GRANULOCYTES %: 0.1 % (ref 0–5)
LDL CHOLESTEROL CALCULATED: 46 MG/DL (ref 0–99)
LYMPHOCYTES ABSOLUTE: 2.35 E9/L (ref 1.5–4)
LYMPHOCYTES RELATIVE PERCENT: 33 % (ref 20–42)
MCH RBC QN AUTO: 30.2 PG (ref 26–35)
MCHC RBC AUTO-ENTMCNC: 32.4 % (ref 32–34.5)
MCV RBC AUTO: 93.3 FL (ref 80–99.9)
MONOCYTES ABSOLUTE: 0.58 E9/L (ref 0.1–0.95)
MONOCYTES RELATIVE PERCENT: 8.1 % (ref 2–12)
NEUTROPHILS ABSOLUTE: 4.08 E9/L (ref 1.8–7.3)
NEUTROPHILS RELATIVE PERCENT: 57.4 % (ref 43–80)
PDW BLD-RTO: 12.6 FL (ref 11.5–15)
PLATELET # BLD: 192 E9/L (ref 130–450)
PMV BLD AUTO: 9.7 FL (ref 7–12)
POTASSIUM SERPL-SCNC: 4.7 MMOL/L (ref 3.5–5)
RBC # BLD: 4.2 E12/L (ref 3.8–5.8)
SODIUM BLD-SCNC: 141 MMOL/L (ref 132–146)
TOTAL PROTEIN: 7 G/DL (ref 6.4–8.3)
TRIGL SERPL-MCNC: 99 MG/DL (ref 0–149)
URIC ACID, SERUM: 7.8 MG/DL (ref 3.4–7)
VLDLC SERPL CALC-MCNC: 20 MG/DL
WBC # BLD: 7.1 E9/L (ref 4.5–11.5)

## 2019-10-07 PROCEDURE — 36415 COLL VENOUS BLD VENIPUNCTURE: CPT

## 2019-10-07 PROCEDURE — 80053 COMPREHEN METABOLIC PANEL: CPT

## 2019-10-07 PROCEDURE — 84550 ASSAY OF BLOOD/URIC ACID: CPT

## 2019-10-07 PROCEDURE — 80061 LIPID PANEL: CPT

## 2019-10-07 PROCEDURE — 85025 COMPLETE CBC W/AUTO DIFF WBC: CPT

## 2019-10-07 PROCEDURE — 83036 HEMOGLOBIN GLYCOSYLATED A1C: CPT

## 2019-10-21 ENCOUNTER — NURSE ONLY (OUTPATIENT)
Dept: FAMILY MEDICINE CLINIC | Age: 69
End: 2019-10-21

## 2019-10-21 VITALS — DIASTOLIC BLOOD PRESSURE: 73 MMHG | SYSTOLIC BLOOD PRESSURE: 140 MMHG

## 2019-10-21 DIAGNOSIS — I10 ESSENTIAL HYPERTENSION: Primary | ICD-10-CM

## 2020-01-21 RX ORDER — AMLODIPINE BESYLATE 10 MG/1
10 TABLET ORAL DAILY
Qty: 90 TABLET | Refills: 3 | Status: SHIPPED
Start: 2020-01-21 | End: 2021-01-22 | Stop reason: SDUPTHER

## 2020-01-21 RX ORDER — FAMOTIDINE 20 MG/1
20 TABLET, FILM COATED ORAL 2 TIMES DAILY
Qty: 180 TABLET | Refills: 3 | Status: SHIPPED
Start: 2020-01-21 | End: 2021-01-22 | Stop reason: SDUPTHER

## 2020-01-21 RX ORDER — ATORVASTATIN CALCIUM 80 MG/1
80 TABLET, FILM COATED ORAL NIGHTLY
Qty: 90 TABLET | Refills: 3 | Status: SHIPPED
Start: 2020-01-21 | End: 2020-06-11

## 2020-01-21 RX ORDER — ISOSORBIDE MONONITRATE 30 MG/1
30 TABLET, EXTENDED RELEASE ORAL DAILY
Qty: 90 TABLET | Refills: 3 | Status: SHIPPED
Start: 2020-01-21 | End: 2021-01-22 | Stop reason: SDUPTHER

## 2020-01-21 RX ORDER — CLOPIDOGREL BISULFATE 75 MG/1
75 TABLET ORAL DAILY
Qty: 90 TABLET | Refills: 3 | Status: SHIPPED
Start: 2020-01-21 | End: 2021-01-22 | Stop reason: SDUPTHER

## 2020-01-21 RX ORDER — METOPROLOL SUCCINATE 25 MG/1
25 TABLET, EXTENDED RELEASE ORAL DAILY
Qty: 90 TABLET | Refills: 3 | Status: SHIPPED
Start: 2020-01-21 | End: 2020-10-20

## 2020-02-03 ENCOUNTER — HOSPITAL ENCOUNTER (OUTPATIENT)
Age: 70
Discharge: HOME | End: 2020-02-03
Payer: MEDICARE

## 2020-02-03 LAB
ALBUMIN SERPL-MCNC: 4.2 G/DL (ref 3.5–5.2)
ALP BLD-CCNC: 123 U/L (ref 40–129)
ALT SERPL-CCNC: 12 U/L (ref 0–40)
ANION GAP SERPL CALCULATED.3IONS-SCNC: 13 MMOL/L (ref 7–16)
AST SERPL-CCNC: 12 U/L (ref 0–39)
BASOPHILS ABSOLUTE: 0.02 E9/L (ref 0–0.2)
BASOPHILS RELATIVE PERCENT: 0.2 % (ref 0–2)
BILIRUB SERPL-MCNC: 0.5 MG/DL (ref 0–1.2)
BUN BLDV-MCNC: 17 MG/DL (ref 8–23)
CALCIUM SERPL-MCNC: 9.7 MG/DL (ref 8.6–10.2)
CHLORIDE BLD-SCNC: 106 MMOL/L (ref 98–107)
CHOLESTEROL, TOTAL: 96 MG/DL (ref 0–199)
CO2: 23 MMOL/L (ref 22–29)
CREAT SERPL-MCNC: 1.2 MG/DL (ref 0.7–1.2)
EOSINOPHILS ABSOLUTE: 0.09 E9/L (ref 0.05–0.5)
EOSINOPHILS RELATIVE PERCENT: 0.9 % (ref 0–6)
GFR AFRICAN AMERICAN: >60
GFR NON-AFRICAN AMERICAN: 60 ML/MIN/1.73
GLUCOSE BLD-MCNC: 113 MG/DL (ref 74–99)
HBA1C MFR BLD: 6.2 % (ref 4–5.6)
HCT VFR BLD CALC: 39.2 % (ref 37–54)
HDLC SERPL-MCNC: 41 MG/DL
HEMOGLOBIN: 12.7 G/DL (ref 12.5–16.5)
IMMATURE GRANULOCYTES #: 0.02 E9/L
IMMATURE GRANULOCYTES %: 0.2 % (ref 0–5)
LDL CHOLESTEROL CALCULATED: 37 MG/DL (ref 0–99)
LYMPHOCYTES ABSOLUTE: 2.16 E9/L (ref 1.5–4)
LYMPHOCYTES RELATIVE PERCENT: 21.1 % (ref 20–42)
MCH RBC QN AUTO: 30 PG (ref 26–35)
MCHC RBC AUTO-ENTMCNC: 32.4 % (ref 32–34.5)
MCV RBC AUTO: 92.7 FL (ref 80–99.9)
MONOCYTES ABSOLUTE: 0.74 E9/L (ref 0.1–0.95)
MONOCYTES RELATIVE PERCENT: 7.2 % (ref 2–12)
NEUTROPHILS ABSOLUTE: 7.2 E9/L (ref 1.8–7.3)
NEUTROPHILS RELATIVE PERCENT: 70.4 % (ref 43–80)
PDW BLD-RTO: 13.1 FL (ref 11.5–15)
PLATELET # BLD: 201 E9/L (ref 130–450)
PMV BLD AUTO: 9.2 FL (ref 7–12)
POTASSIUM SERPL-SCNC: 4.7 MMOL/L (ref 3.5–5)
RBC # BLD: 4.23 E12/L (ref 3.8–5.8)
SODIUM BLD-SCNC: 142 MMOL/L (ref 132–146)
TOTAL PROTEIN: 7.3 G/DL (ref 6.4–8.3)
TRIGL SERPL-MCNC: 90 MG/DL (ref 0–149)
VITAMIN D 25-HYDROXY: 16 NG/ML (ref 30–100)
VLDLC SERPL CALC-MCNC: 18 MG/DL
WBC # BLD: 10.2 E9/L (ref 4.5–11.5)

## 2020-02-03 PROCEDURE — 80053 COMPREHEN METABOLIC PANEL: CPT

## 2020-02-03 PROCEDURE — 82306 VITAMIN D 25 HYDROXY: CPT

## 2020-02-03 PROCEDURE — 80061 LIPID PANEL: CPT

## 2020-02-03 PROCEDURE — 83036 HEMOGLOBIN GLYCOSYLATED A1C: CPT

## 2020-02-03 PROCEDURE — 85025 COMPLETE CBC W/AUTO DIFF WBC: CPT

## 2020-02-03 PROCEDURE — 36415 COLL VENOUS BLD VENIPUNCTURE: CPT

## 2020-06-11 ENCOUNTER — OFFICE VISIT (OUTPATIENT)
Dept: FAMILY MEDICINE CLINIC | Age: 70
End: 2020-06-11
Payer: MEDICARE

## 2020-06-11 VITALS
BODY MASS INDEX: 35.31 KG/M2 | RESPIRATION RATE: 16 BRPM | HEIGHT: 68 IN | DIASTOLIC BLOOD PRESSURE: 94 MMHG | HEART RATE: 94 BPM | SYSTOLIC BLOOD PRESSURE: 148 MMHG | TEMPERATURE: 96.8 F | OXYGEN SATURATION: 97 % | WEIGHT: 233 LBS

## 2020-06-11 PROCEDURE — G0438 PPPS, INITIAL VISIT: HCPCS | Performed by: FAMILY MEDICINE

## 2020-06-11 RX ORDER — LISINOPRIL 20 MG/1
20 TABLET ORAL DAILY
Qty: 90 TABLET | Refills: 1 | Status: CANCELLED | OUTPATIENT
Start: 2020-06-11

## 2020-06-11 ASSESSMENT — PATIENT HEALTH QUESTIONNAIRE - PHQ9
SUM OF ALL RESPONSES TO PHQ QUESTIONS 1-9: 0
SUM OF ALL RESPONSES TO PHQ QUESTIONS 1-9: 0

## 2020-06-11 ASSESSMENT — LIFESTYLE VARIABLES: HOW OFTEN DO YOU HAVE A DRINK CONTAINING ALCOHOL: 0

## 2020-06-11 NOTE — PROGRESS NOTES
History   Problem Relation Age of Onset    High Blood Pressure Mother     Heart Disease Mother     Dementia Mother     Diabetes Mother     Cancer Father 62        lung, tongue, brain    Heart Attack Sister     Heart Attack Brother        CareTeam (Including outside providers/suppliers regularly involved in providing care):   Patient Care Team:  Gerlene Ahumada, MD as PCP - General (Family Medicine)  Gerlene Ahumada, MD as PCP - [facility] Empaneled Provider    Wt Readings from Last 3 Encounters:   06/11/20 233 lb (105.7 kg)   10/04/19 222 lb (100.7 kg)   09/20/19 228 lb (103.4 kg)     Vitals:    06/11/20 0938 06/11/20 0942   BP: (!) 168/98 (!) 148/94   Site: Right Upper Arm    Position: Sitting    Cuff Size: Medium Adult    Pulse: 83 94   Resp: 16    Temp: 96.8 °F (36 °C)    SpO2: 97%    Weight: 233 lb (105.7 kg)    Height: 5' 8\" (1.727 m)      Body mass index is 35.43 kg/m². Based upon direct observation of the patient, evaluation of cognition reveals recent and remote memory intact. Exam:  No ttp b/l thighs. FROM. Patient's complete Health Risk Assessment and screening values have been reviewed and are found in Flowsheets. The following problems were reviewed today and where indicated follow up appointments were made and/or referrals ordered. Declined all vaccines today    Positive Risk Factor Screenings with Interventions:     General Health:  General  In general, how would you say your health is?: Good  In the past 7 days, have you experienced any of the following? New or Increased Pain, New or Increased Fatigue, Loneliness, Social Isolation, Stress or Anger?: (!) New or Increased Pain  Do you get the social and emotional support that you need?: Yes  Do you have a Living Will?: (!) No  General Health Risk Interventions:  · Pain issues: thighs only. no LBP or hip pain. 1 month.    · No Living Will: provided the state-specific advance directive document to the patient    Health

## 2020-07-02 ENCOUNTER — TELEPHONE (OUTPATIENT)
Dept: FAMILY MEDICINE CLINIC | Age: 70
End: 2020-07-02

## 2020-07-16 ENCOUNTER — OFFICE VISIT (OUTPATIENT)
Dept: FAMILY MEDICINE CLINIC | Age: 70
End: 2020-07-16
Payer: MEDICARE

## 2020-07-16 VITALS
SYSTOLIC BLOOD PRESSURE: 150 MMHG | HEART RATE: 94 BPM | HEIGHT: 70 IN | RESPIRATION RATE: 20 BRPM | DIASTOLIC BLOOD PRESSURE: 94 MMHG | WEIGHT: 229 LBS | TEMPERATURE: 96.6 F | BODY MASS INDEX: 32.78 KG/M2

## 2020-07-16 PROCEDURE — 99213 OFFICE O/P EST LOW 20 MIN: CPT | Performed by: FAMILY MEDICINE

## 2020-07-16 NOTE — PROGRESS NOTES
FM Progress Note    Subjective: Bipin Tillman is a 79y.o. year old male here for leg pain. Health Maintenance Due   Topic Date Due    DTaP/Tdap/Td vaccine (1 - Tdap) 01/11/1969    Shingles Vaccine (1 of 2) 01/11/2000    Pneumococcal 65+ years Vaccine (1 of 1 - PPSV23) 01/11/2015       For a couple months having b/l leg pains. Starts in thighs and goes down to feet. Feet hurt after walking at grocery store. Burning in feet, legs don't feel it as much. No LBP. No trauma. Staying the same. Pain goes away after an hour of stopping walking. No worse up or down hill or up or down stairs. Feet don't turn blue or gray. Does not feel the need to lean over the shopping cart. Previously Dx'd w/ PAD. Sees cards for CAD. Normal A1C. Vitamin D 16.  Not taking supplement    Declined PNA shot    Past Medical History:   Diagnosis Date    CAD (coronary artery disease)     Hypertension     STEMI (ST elevation myocardial infarction) ([facility] [address] 10/23/2018     Past Surgical History:   Procedure Laterality Date    CORONARY ANGIOPLASTY WITH STENT PLACEMENT  10/23/2018    3.5 x 26 Resolute [place] to mid RCA by Dr. Winston Chvaez     Family History   Problem Relation Age of Onset    High Blood Pressure Mother     Heart Disease Mother     Dementia Mother     Diabetes Mother     Cancer Father 62        lung, tongue, brain    Heart Attack Sister     Heart Attack Brother      Social History     Socioeconomic History    Marital status:      Spouse name: Not on file    Number of children: Not on file    Years of education: Not on file    Highest education level: Not on file   Occupational History    Not on file   Social Needs    Financial resource strain: Not on file    Food insecurity     Worry: Not on file     Inability: Not on file    Transportation needs     Medical: Not on file     Non-medical: Not on file   Tobacco Use    Smoking status: Never Smoker    Smokeless tobacco: Never Used   Substance and Sexual Activity    Alcohol use: No     Comment: no alcohol x 50 years    Drug use: No    Sexual activity: Not on file   Lifestyle    Physical activity     Days per week: Not on file     Minutes per session: Not on file    Stress: Not on file   Relationships    Social connections     Talks on phone: Not on file     Gets together: Not on file     Attends Zoroastrian service: Not on file     Active member of club or organization: Not on file     Attends meetings of clubs or organizations: Not on file     Relationship status: Not on file    Intimate partner violence     Fear of current or ex partner: Not on file     Emotionally abused: Not on file     Physically abused: Not on file     Forced sexual activity: Not on file   Other Topics Concern    Not on file   Social History Narrative    Drinks 2 cups of coffee daily. Review Of Systems (unless otherwise specified)  Gen: No fevers, sweats, chills   No fatigue   No weight unintentional weight changes  Skin:  No rash, no lesion  Resp: No cough, shortness of breath, wheeze, chest congestion  CV: No chest pain, palpitation, edema   GI:  No abdominal pain   No nausea, no vomiting   No change in bowels, no diarrhea or constipation   No blood in stool or blood per rectum  Neuro: No headaches   No syncope/near syncope   No dizziness  MS: No back pain   No arthralgias   No myalgias   No gait issues        Objective:   BP (!) 150/94   Pulse 94   Temp 96.6 °F (35.9 °C) (Temporal)   Resp 20   Ht 5' 10\" (1.778 m)   Wt 229 lb (103.9 kg)   BMI 32.86 kg/m²   General appearance: NAD, alert and interacting appropriately  HEENT: NCAT, PERRLA, EOMI   Resp: CTAB, no WRC  CVS: RRR, no MRG  Abdomen: BS +, SNDNT  Extremities: No clubbing, cyanosis, or edema. Warm. Dry. Pulses PTs 1+ b/l. Motor and sensation intact. I have reviewed this patient's previous records. I have reviewed this patient's labs. I have reviewed this patient's medications.       Assessment/Plan:    Justin Ryan was seen today for leg pain. Diagnoses and all orders for this visit:    Need for pneumococcal vaccination  -     Pneumococcal polysaccharide vaccine 23-valent greater than or equal to 3yo subcutaneous/IM    Claudication (HCC)  -     VL MORGAN BILATERAL LIMITED 1-2 LEVELS; Future            Patient Instructions   If the bloodflow in your legs is fine, then we can get an EMG test to assess the nerves in your legs. consider Xrays for arthritis     Return in about 3 months (around 10/16/2020) for leg pain.       Electronically signed by Fidencio Dupree MD on 7/16/2020 at 11:21 AM

## 2020-07-20 ENCOUNTER — HOSPITAL ENCOUNTER (OUTPATIENT)
Dept: INTERVENTIONAL RADIOLOGY/VASCULAR | Age: 70
Discharge: HOME | End: 2020-07-22
Payer: MEDICARE

## 2020-07-20 PROCEDURE — 93922 UPR/L XTREMITY ART 2 LEVELS: CPT

## 2020-08-04 ENCOUNTER — HOSPITAL ENCOUNTER (OUTPATIENT)
Age: 70
Discharge: HOME | End: 2020-08-04
Payer: MEDICARE

## 2020-08-04 LAB
ALBUMIN SERPL-MCNC: 4.2 G/DL (ref 3.5–5.2)
ALP BLD-CCNC: 96 U/L (ref 40–129)
ALT SERPL-CCNC: 9 U/L (ref 0–40)
ANION GAP SERPL CALCULATED.3IONS-SCNC: 11 MMOL/L (ref 7–16)
AST SERPL-CCNC: 11 U/L (ref 0–39)
BASOPHILS ABSOLUTE: 0.02 E9/L (ref 0–0.2)
BASOPHILS RELATIVE PERCENT: 0.3 % (ref 0–2)
BILIRUB SERPL-MCNC: 0.3 MG/DL (ref 0–1.2)
BUN BLDV-MCNC: 21 MG/DL (ref 8–23)
CALCIUM SERPL-MCNC: 10 MG/DL (ref 8.6–10.2)
CHLORIDE BLD-SCNC: 107 MMOL/L (ref 98–107)
CHOLESTEROL, TOTAL: 155 MG/DL (ref 0–199)
CO2: 23 MMOL/L (ref 22–29)
CREAT SERPL-MCNC: 1.5 MG/DL (ref 0.7–1.2)
EOSINOPHILS ABSOLUTE: 0.12 E9/L (ref 0.05–0.5)
EOSINOPHILS RELATIVE PERCENT: 1.7 % (ref 0–6)
GFR AFRICAN AMERICAN: 56
GFR NON-AFRICAN AMERICAN: 46 ML/MIN/1.73
GLUCOSE BLD-MCNC: 115 MG/DL (ref 74–99)
HBA1C MFR BLD: 6.1 % (ref 4–5.6)
HCT VFR BLD CALC: 41.5 % (ref 37–54)
HDLC SERPL-MCNC: 39 MG/DL
HEMOGLOBIN: 13.9 G/DL (ref 12.5–16.5)
IMMATURE GRANULOCYTES #: 0.02 E9/L
IMMATURE GRANULOCYTES %: 0.3 % (ref 0–5)
LDL CHOLESTEROL CALCULATED: 79 MG/DL (ref 0–99)
LYMPHOCYTES ABSOLUTE: 2.33 E9/L (ref 1.5–4)
LYMPHOCYTES RELATIVE PERCENT: 33.8 % (ref 20–42)
MCH RBC QN AUTO: 32.1 PG (ref 26–35)
MCHC RBC AUTO-ENTMCNC: 33.5 % (ref 32–34.5)
MCV RBC AUTO: 95.8 FL (ref 80–99.9)
MONOCYTES ABSOLUTE: 0.51 E9/L (ref 0.1–0.95)
MONOCYTES RELATIVE PERCENT: 7.4 % (ref 2–12)
NEUTROPHILS ABSOLUTE: 3.9 E9/L (ref 1.8–7.3)
NEUTROPHILS RELATIVE PERCENT: 56.5 % (ref 43–80)
PDW BLD-RTO: 12.4 FL (ref 11.5–15)
PLATELET # BLD: 202 E9/L (ref 130–450)
PMV BLD AUTO: 8.9 FL (ref 7–12)
POTASSIUM SERPL-SCNC: 5.1 MMOL/L (ref 3.5–5)
RBC # BLD: 4.33 E12/L (ref 3.8–5.8)
SODIUM BLD-SCNC: 141 MMOL/L (ref 132–146)
TOTAL PROTEIN: 7.2 G/DL (ref 6.4–8.3)
TRIGL SERPL-MCNC: 184 MG/DL (ref 0–149)
URIC ACID, SERUM: 7.1 MG/DL (ref 3.4–7)
VLDLC SERPL CALC-MCNC: 37 MG/DL
WBC # BLD: 6.9 E9/L (ref 4.5–11.5)

## 2020-08-04 PROCEDURE — 83036 HEMOGLOBIN GLYCOSYLATED A1C: CPT

## 2020-08-04 PROCEDURE — 84550 ASSAY OF BLOOD/URIC ACID: CPT

## 2020-08-04 PROCEDURE — 80053 COMPREHEN METABOLIC PANEL: CPT

## 2020-08-04 PROCEDURE — 85025 COMPLETE CBC W/AUTO DIFF WBC: CPT

## 2020-08-04 PROCEDURE — 36415 COLL VENOUS BLD VENIPUNCTURE: CPT

## 2020-08-04 PROCEDURE — 80061 LIPID PANEL: CPT

## 2020-09-30 ENCOUNTER — OFFICE VISIT (OUTPATIENT)
Dept: CARDIOLOGY CLINIC | Age: 70
End: 2020-09-30
Payer: MEDICARE

## 2020-09-30 VITALS
WEIGHT: 228.6 LBS | DIASTOLIC BLOOD PRESSURE: 80 MMHG | BODY MASS INDEX: 32.73 KG/M2 | SYSTOLIC BLOOD PRESSURE: 128 MMHG | RESPIRATION RATE: 20 BRPM | HEART RATE: 67 BPM | HEIGHT: 70 IN

## 2020-09-30 PROCEDURE — 99214 OFFICE O/P EST MOD 30 MIN: CPT | Performed by: INTERNAL MEDICINE

## 2020-09-30 PROCEDURE — 93000 ELECTROCARDIOGRAM COMPLETE: CPT | Performed by: INTERNAL MEDICINE

## 2020-09-30 RX ORDER — ACETAMINOPHEN 160 MG
1 TABLET,DISINTEGRATING ORAL DAILY
COMMUNITY

## 2020-10-14 ENCOUNTER — PROCEDURE VISIT (OUTPATIENT)
Dept: AUDIOLOGY | Age: 70
End: 2020-10-14
Payer: MEDICARE

## 2020-10-14 ENCOUNTER — OFFICE VISIT (OUTPATIENT)
Dept: ENT CLINIC | Age: 70
End: 2020-10-14
Payer: MEDICARE

## 2020-10-14 VITALS — WEIGHT: 228 LBS | BODY MASS INDEX: 32.64 KG/M2 | TEMPERATURE: 97.8 F | HEIGHT: 70 IN

## 2020-10-14 PROCEDURE — 69210 REMOVE IMPACTED EAR WAX UNI: CPT | Performed by: OTOLARYNGOLOGY

## 2020-10-14 PROCEDURE — 92557 COMPREHENSIVE HEARING TEST: CPT | Performed by: AUDIOLOGIST

## 2020-10-14 PROCEDURE — 99204 OFFICE O/P NEW MOD 45 MIN: CPT | Performed by: OTOLARYNGOLOGY

## 2020-10-14 PROCEDURE — 92567 TYMPANOMETRY: CPT | Performed by: AUDIOLOGIST

## 2020-10-14 ASSESSMENT — ENCOUNTER SYMPTOMS
APNEA: 0
EYES NEGATIVE: 1
GASTROINTESTINAL NEGATIVE: 1
EYE PAIN: 0
COLOR CHANGE: 0
RESPIRATORY NEGATIVE: 1
DIARRHEA: 0
EYE DISCHARGE: 0
VOMITING: 0
SHORTNESS OF BREATH: 0
ABDOMINAL PAIN: 0
CHEST TIGHTNESS: 0

## 2020-10-14 NOTE — PROGRESS NOTES
This patient was referred for audiometric/tympanometric testing by Dr. Any Ware due to hearing loss. Audiometry revealed mild sloping to severe sensorineural hearing loss bilaterally. Reliability was good. Speech reception thresholds were in good agreement with the pure tone averages, bilaterally. Speech discrimination scores were poor, bilaterally. Tympanometry revealed normal middle ear peak pressure and compliance, bilaterally. The results were reviewed with the patient and physician. Recommendations for follow up will be made pending physician consult. Patient was given audiology card and will call if interested in amplification.  Checking with insurance    Estrellita Paige, AuD/CCC-A  [id number]

## 2020-10-14 NOTE — PATIENT INSTRUCTIONS
Thank you for choosing our [email or web address] or [facility] practice. We are committed to your medical treatment and  care. If you need to reschedule or cancel your surgery or follow up  appointment, please call the surgery scheduler at [phone]. INSTRUCTIONS FOR SURGERY    Nothing to eat or drink after midnight the night before surgery unless surgery is at [facility] or otherwise instructed by the hospital.    DO NOT TAKE ANY ASPIRIN PRODUCTS 7 days prior to surgery-unless required by your cardiologist or primary care physician. Tylenol only. No Advil, Motrin, Aleve, or Ibuprofen    Any illegal drugs in your system (including Marijuana even if legally prescribed) will result in your surgery being cancelled. Please be sure to check with our office or the hospital on time frame for the drugs to be out of your system. Should your insurance change at any time you must contact our office. Failure to do so may result in your surgery being rescheduled. If you need paperwork filled out for work, you must give the office 2 weeks to complete and submit the forms.       [address], [facility] will call you a couple days prior to your surgery and give you further instructions, if any questions call them at [phone]

## 2020-10-14 NOTE — PROGRESS NOTES
Subjective:     Patient ID:  Allan Carbajal is a 79 y.o. male. HPI:    Hearing Loss  Patient presents today with complaints of hearing loss. Concern regarding hearing has been present for 1 years. He has not failed a prior hearing test.The patient reports difficulty hearing, turning up the T.V..           Patient does not have hearing aids at this time.   History of Head trauma: no   Description: none  History of surgery to the head/neck: no   Description:none  History of cerumen impaction: no  History of noise exposure: yes   Type: palacios  Spinning: no   Length of time:   Hearing loss: yes    Fluctuating: no  Aural pressure: no  Tinnitus:no  Otalgia:no    Past Medical History:   Diagnosis Date    CAD (coronary artery disease)     Hypertension     STEMI (ST elevation myocardial infarction) ([facility] [address] 10/23/2018     Past Surgical History:   Procedure Laterality Date    CORONARY ANGIOPLASTY WITH STENT PLACEMENT  10/23/2018    3.5 x 26 Resolute [place] to mid RCA by Dr. Tyron Tatum     Family History   Problem Relation Age of Onset    High Blood Pressure Mother     Heart Disease Mother     Dementia Mother     Diabetes Mother     Cancer Father 62        lung, tongue, brain    Heart Attack Sister     Heart Attack Brother      Social History     Socioeconomic History    Marital status:      Spouse name: None    Number of children: None    Years of education: None    Highest education level: None   Occupational History    None   Social Needs    Financial resource strain: None    Food insecurity     Worry: None     Inability: None    Transportation needs     Medical: None     Non-medical: None   Tobacco Use    Smoking status: Never Smoker    Smokeless tobacco: Never Used   Substance and Sexual Activity    Alcohol use: No     Comment: no alcohol x 50 years    Drug use: No    Sexual activity: Not Currently   Lifestyle    Physical activity     Days per week: None     Minutes per session: None    ear canal and external ear normal. Decreased hearing noted. There is impacted cerumen. Left Ear: Tympanic membrane, ear canal and external ear normal. Decreased hearing noted. Nose: Nose normal. No signs of injury or laceration. Left Nostril: No septal hematoma. Right Turbinates: Not enlarged. Right Sinus: No maxillary sinus tenderness. Left Sinus: No maxillary sinus tenderness. Comments: 1.0 cm lesion on the nose raised and friable tissue     Mouth/Throat:      Pharynx: Uvula midline. Eyes:      Conjunctiva/sclera: Conjunctivae normal.      Pupils: Pupils are equal, round, and reactive to light. Neck:      Musculoskeletal: Normal range of motion and neck supple. Cardiovascular:      Rate and Rhythm: Normal rate and regular rhythm. Heart sounds: Normal heart sounds. Pulmonary:      Effort: Pulmonary effort is normal.      Breath sounds: Normal breath sounds. Abdominal:      General: Bowel sounds are normal.      Palpations: Abdomen is soft. Skin:     General: Skin is warm and dry. Neurological:      Mental Status: He is alert and oriented to person, place, and time. Audiogram -              Cerumen removal     Auditorycanal(s) right ear completely obstructed with cerumen. Cerumen was gently removed using soft plastic curette. Tympanic membranes are intact following the procedure. Auditory canals appear normal.                Assessment:       Diagnosis Orders   1. Bilateral impacted cerumen  [place] REMOVAL IMPACTED CERUMEN INSTRUMENTATION UNILAT   2. Sensorineural hearing loss (SNHL) of both ears  Ambulatory referral to Audiology   3. Skin lesion of face                Plan:      I recommend:    Excision right nasal skin lesion with reconstruction ; local flap. The procedure risks and benefits were discussed with the patient and family. Pt and family understood and decided to proceed with the surgery.     Risks of any anesthesia are:  Reactions to medicines   Breathing problems  Risks of any surgery are:  Bleeding   Infection     Specific risks of surgery:        Hearing is diminished. Pt does not have hearing aids at this time. Pt is  a candidate for hearing aids and is  interested in discussing this with audiology. Also discussed the importance of hearing protection from now on to preserve remaining hearing.      Call or return to clinic prn if these symptoms worsen or fail to improve as anticipated    Follow up in 1 year(s)

## 2020-10-20 ENCOUNTER — OFFICE VISIT (OUTPATIENT)
Dept: FAMILY MEDICINE CLINIC | Age: 70
End: 2020-10-20
Payer: MEDICARE

## 2020-10-20 ENCOUNTER — HOSPITAL ENCOUNTER (OUTPATIENT)
Age: 70
Discharge: HOME | End: 2020-10-22
Payer: MEDICARE

## 2020-10-20 VITALS
SYSTOLIC BLOOD PRESSURE: 145 MMHG | HEART RATE: 60 BPM | RESPIRATION RATE: 20 BRPM | WEIGHT: 225 LBS | HEIGHT: 68 IN | TEMPERATURE: 96.6 F | DIASTOLIC BLOOD PRESSURE: 94 MMHG | BODY MASS INDEX: 34.1 KG/M2

## 2020-10-20 LAB — IRON: 95 MCG/DL (ref 59–158)

## 2020-10-20 PROCEDURE — 99213 OFFICE O/P EST LOW 20 MIN: CPT | Performed by: FAMILY MEDICINE

## 2020-10-20 PROCEDURE — 83540 ASSAY OF IRON: CPT

## 2020-10-20 RX ORDER — METOPROLOL SUCCINATE 50 MG/1
50 TABLET, EXTENDED RELEASE ORAL 2 TIMES DAILY
COMMUNITY
Start: 2020-08-22 | End: 2021-07-20 | Stop reason: SDUPTHER

## 2020-10-20 RX ORDER — FAMOTIDINE 20 MG/1
20 TABLET, FILM COATED ORAL DAILY
COMMUNITY
Start: 2020-08-02 | End: 2020-10-20

## 2020-10-20 RX ORDER — ATORVASTATIN CALCIUM 80 MG/1
80 TABLET, FILM COATED ORAL DAILY
COMMUNITY
Start: 2020-08-02 | End: 2020-11-12

## 2020-10-20 NOTE — PATIENT INSTRUCTIONS
We will check iron and the EMG to find a cause for the numbness in the toes. If the pain with walking seems to be getting worse, then we can check arterial doppler ultrasound to look at bloodflow.

## 2020-10-20 NOTE — PROGRESS NOTES
FM Progress Note    Subjective: Allan Carbajal is a 79y.o. year old male here for leg pain. Health Maintenance Due   Topic Date Due    DTaP/Tdap/Td vaccine (1 - Tdap) 01/11/1969    Shingles Vaccine (1 of 2) 01/11/2000    Pneumococcal 65+ years Vaccine (1 of 1 - PPSV23) 01/11/2015    Flu vaccine (1) 09/01/2020       Feels leg pains are better. Narrative    Normal ankle arm index, with good arterial flow to both feet including    the toes based upon the pulse volume recordings    Still having numbness in toes.  A1C    Declined PNA and flu shot    Past Medical History:   Diagnosis Date    CAD (coronary artery disease)     Hypertension     STEMI (ST elevation myocardial infarction) ([facility] [address] 10/23/2018     Past Surgical History:   Procedure Laterality Date    CORONARY ANGIOPLASTY WITH STENT PLACEMENT  10/23/2018    3.5 x 26 Resolute José to mid RCA by Dr. Tyron Tatum     Family History   Problem Relation Age of Onset    High Blood Pressure Mother     Heart Disease Mother     Dementia Mother     Diabetes Mother     Cancer Father 62        lung, tongue, brain    Heart Attack Sister     Heart Attack Brother      Social History     Socioeconomic History    Marital status:      Spouse name: Not on file    Number of children: Not on file    Years of education: Not on file    Highest education level: Not on file   Occupational History    Not on file   Social Needs    Financial resource strain: Not on file    Food insecurity     Worry: Not on file     Inability: Not on file    Transportation needs     Medical: Not on file     Non-medical: Not on file   Tobacco Use    Smoking status: Never Smoker    Smokeless tobacco: Never Used   Substance and Sexual Activity    Alcohol use: No     Comment: no alcohol x 50 years    Drug use: No    Sexual activity: Not Currently   Lifestyle    Physical activity     Days per week: Not on file     Minutes per session: Not on file    Stress: Not on file Relationships    Social connections     Talks on phone: Not on file     Gets together: Not on file     Attends Confucianism service: Not on file     Active member of club or organization: Not on file     Attends meetings of clubs or organizations: Not on file     Relationship status: Not on file    Intimate partner violence     Fear of current or ex partner: Not on file     Emotionally abused: Not on file     Physically abused: Not on file     Forced sexual activity: Not on file   Other Topics Concern    Not on file   Social History Narrative    Drinks 2 cups of coffee daily. Review Of Systems (unless otherwise specified)  Gen: No fevers, sweats, chills   No fatigue   No weight unintentional weight changes  Skin:  No rash, no lesion  Resp: No cough, shortness of breath, wheeze, chest congestion  CV: No chest pain, palpitation, edema   GI:  No abdominal pain   No nausea, no vomiting   No change in bowels, no diarrhea or constipation   No blood in stool or blood per rectum        Objective:   BP (!) 145/94   Pulse 60   Temp 96.6 °F (35.9 °C) (Temporal)   Resp 20   Ht 5' 8\" (1.727 m)   Wt 225 lb (102.1 kg)   BMI 34.21 kg/m²   General appearance: NAD, alert and interacting appropriately  HEENT: NCAT, PERRLA, EOMI   Resp: CTAB, no WRC  CVS: RRR, no MRG  Abdomen: BS +, SNDNT  Extremities: No clubbing, cyanosis, or edema. Warm. Dry. Pulses PTs 1+ b/l. Motor and sensation intact. BPs at home are controlled per his records today      I have reviewed this patient's previous records. I have reviewed this patient's labs and imaging    I have reviewed this patient's medications. Assessment/Plan:    Edita Barros was seen today for hypertension. Diagnoses and all orders for this visit:    Neuropathy of both feet  -     EMG; Future  -     IRON; Future    Chronic kidney disease, unspecified CKD stage   -     IRON;  Future            Patient Instructions   We will check iron and the EMG to find a cause for the

## 2020-10-30 ENCOUNTER — HOSPITAL ENCOUNTER (OUTPATIENT)
Dept: NEUROLOGY | Age: 70
Discharge: HOME | End: 2020-10-30
Payer: MEDICARE

## 2020-10-30 PROCEDURE — 95911 NRV CNDJ TEST 9-10 STUDIES: CPT

## 2020-10-30 PROCEDURE — 95886 MUSC TEST DONE W/N TEST COMP: CPT

## 2020-10-30 PROCEDURE — 95886 MUSC TEST DONE W/N TEST COMP: CPT | Performed by: PSYCHIATRY & NEUROLOGY

## 2020-10-30 PROCEDURE — 95911 NRV CNDJ TEST 9-10 STUDIES: CPT | Performed by: PSYCHIATRY & NEUROLOGY

## 2020-11-11 ENCOUNTER — HOSPITAL ENCOUNTER (OUTPATIENT)
Age: 70
Discharge: HOME | End: 2020-11-13
Payer: MEDICARE

## 2020-11-11 PROCEDURE — U0003 INFECTIOUS AGENT DETECTION BY NUCLEIC ACID (DNA OR RNA); SEVERE ACUTE RESPIRATORY SYNDROME CORONAVIRUS 2 (SARS-COV-2) (CORONAVIRUS DISEASE [COVID-19]), AMPLIFIED PROBE TECHNIQUE, MAKING USE OF HIGH THROUGHPUT TECHNOLOGIES AS DESCRIBED BY CMS-2020-01-R: HCPCS

## 2020-11-12 NOTE — PROGRESS NOTES
[facility] PRE-ADMISSION TESTING INSTRUCTIONS    The Preadmission Testing patient is instructed accordingly using the following criteria (check applicable):    ARRIVAL INSTRUCTIONS:  [x] Parking the day of Surgery is located in the Main Entrance lot. Upon entering the door, make an immediate right to the surgery reception desk    [x] Bring photo ID and insurance card    [] Bring in a copy of Living will or Durable Power of  papers. [x] Please be sure to arrange for responsible adult to provide transportation to and from the hospital    [x] Please arrange for responsible adult to be with you for the 24 hour period post procedure due to having anesthesia      GENERAL INSTRUCTIONS:    [x] Nothing by mouth after midnight, including gum, candy, mints or water    [x] You may brush your teeth, but do not swallow any water    [x] Take medications as instructed with 1-2 oz of water    [x] Stop herbal supplements and vitamins 5 days prior to procedure    [x] Follow preop dosing of blood thinners per physician instructions    [] Take 1/2 dose of evening insulin, but no insulin after midnight    [] No oral diabetic medications after midnight    [] If diabetic and have low blood sugar or feel symptomatic, take 1-2oz apple juice only    [] Bring inhalers day of surgery    [] Bring C-PAP/ Bi-Pap day of surgery    [] Bring urine specimen day of surgery    [x] Shower or bath with soap, lather and rinse well, AM of Surgery, no lotion, powders or creams to surgical site    [] Follow bowel prep as instructed per surgeon    [x] No tobacco products within 24 hours of surgery     [x] No alcohol or illegal drug use within 24 hours of surgery.     [x] Jewelry, body piercing's, eyeglasses, contact lenses and dentures are not permitted into surgery (bring cases)      [x] Please do not wear any nail polish, make up or hair products on the day of surgery    [x] You can expect a call the business day prior to procedure to notify you if your arrival time changes    [] If you receive a survey after surgery we would greatly appreciate your comments    [] Parent/guardian of a minor must accompany their child and remain on the premises  the entire time they are under our care     [] Pediatric patients may bring favorite toy, blanket or comfort item with them    [] A caregiver or family member must remain with the patient during their stay if they are mentally handicapped, have dementia, disoriented or unable to use a call light or would be a safety concern if left unattended    [x] Please notify surgeon if you develop any illness between now and time of surgery (cold, cough, sore throat, fever, nausea, vomiting) or any signs of infections  including skin, wounds, and dental.    [x]  The Outpatient Pharmacy is available to fill your prescription here on your day of surgery, ask your preop nurse for details    [] Other instructions    EDUCATIONAL MATERIALS PROVIDED:    [] PAT Preoperative Education Packet/Booklet     [] Medication List    [] Transfusion bracelet applied with instructions    [] Shower with soap, lather and rinse well, and use CHG wipes provided the evening before surgery as instructed    [] Incentive spirometer with instructions

## 2020-11-13 LAB
SARS-COV-2: NOT DETECTED
SOURCE: NORMAL

## 2020-11-16 ENCOUNTER — ANESTHESIA EVENT (OUTPATIENT)
Dept: OPERATING ROOM | Age: 70
End: 2020-11-16
Payer: MEDICARE

## 2020-11-16 ENCOUNTER — ANESTHESIA (OUTPATIENT)
Dept: OPERATING ROOM | Age: 70
End: 2020-11-16
Payer: MEDICARE

## 2020-11-16 ENCOUNTER — HOSPITAL ENCOUNTER (OUTPATIENT)
Age: 70
Setting detail: OUTPATIENT SURGERY
Discharge: HOME | End: 2020-11-16
Attending: OTOLARYNGOLOGY | Admitting: OTOLARYNGOLOGY
Payer: MEDICARE

## 2020-11-16 VITALS
RESPIRATION RATE: 22 BRPM | SYSTOLIC BLOOD PRESSURE: 156 MMHG | OXYGEN SATURATION: 97 % | DIASTOLIC BLOOD PRESSURE: 99 MMHG

## 2020-11-16 VITALS
SYSTOLIC BLOOD PRESSURE: 152 MMHG | OXYGEN SATURATION: 97 % | WEIGHT: 225 LBS | RESPIRATION RATE: 16 BRPM | DIASTOLIC BLOOD PRESSURE: 85 MMHG | TEMPERATURE: 96.7 F | HEART RATE: 66 BPM | HEIGHT: 68 IN | BODY MASS INDEX: 34.1 KG/M2

## 2020-11-16 PROCEDURE — 88305 TISSUE EXAM BY PATHOLOGIST: CPT

## 2020-11-16 PROCEDURE — 7100000010 HC PHASE II RECOVERY - FIRST 15 MIN: Performed by: OTOLARYNGOLOGY

## 2020-11-16 PROCEDURE — 14060 TIS TRNFR E/N/E/L 10 SQ CM/<: CPT | Performed by: OTOLARYNGOLOGY

## 2020-11-16 PROCEDURE — 6360000002 HC RX W HCPCS: Performed by: STUDENT IN AN ORGANIZED HEALTH CARE EDUCATION/TRAINING PROGRAM

## 2020-11-16 PROCEDURE — 2709999900 HC NON-CHARGEABLE SUPPLY: Performed by: OTOLARYNGOLOGY

## 2020-11-16 PROCEDURE — 3700000001 HC ADD 15 MINUTES (ANESTHESIA): Performed by: OTOLARYNGOLOGY

## 2020-11-16 PROCEDURE — 2500000003 HC RX 250 WO HCPCS: Performed by: OTOLARYNGOLOGY

## 2020-11-16 PROCEDURE — 6370000000 HC RX 637 (ALT 250 FOR IP): Performed by: OTOLARYNGOLOGY

## 2020-11-16 PROCEDURE — 6370000000 HC RX 637 (ALT 250 FOR IP)

## 2020-11-16 PROCEDURE — 7100000011 HC PHASE II RECOVERY - ADDTL 15 MIN: Performed by: OTOLARYNGOLOGY

## 2020-11-16 PROCEDURE — 6360000002 HC RX W HCPCS: Performed by: NURSE ANESTHETIST, CERTIFIED REGISTERED

## 2020-11-16 PROCEDURE — 3600000002 HC SURGERY LEVEL 2 BASE: Performed by: OTOLARYNGOLOGY

## 2020-11-16 PROCEDURE — 2500000003 HC RX 250 WO HCPCS: Performed by: NURSE ANESTHETIST, CERTIFIED REGISTERED

## 2020-11-16 PROCEDURE — 88331 PATH CONSLTJ SURG 1 BLK 1SPC: CPT

## 2020-11-16 PROCEDURE — 3600000012 HC SURGERY LEVEL 2 ADDTL 15MIN: Performed by: OTOLARYNGOLOGY

## 2020-11-16 PROCEDURE — 2580000003 HC RX 258: Performed by: NURSE ANESTHETIST, CERTIFIED REGISTERED

## 2020-11-16 PROCEDURE — 3700000000 HC ANESTHESIA ATTENDED CARE: Performed by: OTOLARYNGOLOGY

## 2020-11-16 RX ORDER — SODIUM CHLORIDE 9 MG/ML
INJECTION, SOLUTION INTRAVENOUS CONTINUOUS PRN
Status: DISCONTINUED | OUTPATIENT
Start: 2020-11-16 | End: 2020-11-16 | Stop reason: SDUPTHER

## 2020-11-16 RX ORDER — LIDOCAINE HYDROCHLORIDE 20 MG/ML
INJECTION, SOLUTION EPIDURAL; INFILTRATION; INTRACAUDAL; PERINEURAL PRN
Status: DISCONTINUED | OUTPATIENT
Start: 2020-11-16 | End: 2020-11-16 | Stop reason: SDUPTHER

## 2020-11-16 RX ORDER — PROPOFOL 10 MG/ML
INJECTION, EMULSION INTRAVENOUS PRN
Status: DISCONTINUED | OUTPATIENT
Start: 2020-11-16 | End: 2020-11-16 | Stop reason: SDUPTHER

## 2020-11-16 RX ORDER — MIDAZOLAM HYDROCHLORIDE 1 MG/ML
INJECTION INTRAMUSCULAR; INTRAVENOUS PRN
Status: DISCONTINUED | OUTPATIENT
Start: 2020-11-16 | End: 2020-11-16 | Stop reason: SDUPTHER

## 2020-11-16 RX ORDER — CEPHALEXIN 250 MG/1
250 CAPSULE ORAL 3 TIMES DAILY
Qty: 21 CAPSULE | Refills: 0 | Status: SHIPPED | OUTPATIENT
Start: 2020-11-16 | End: 2020-11-23

## 2020-11-16 RX ORDER — HYDROCODONE BITARTRATE AND ACETAMINOPHEN 5; 325 MG/1; MG/1
2 TABLET ORAL PRN
Status: DISCONTINUED | OUTPATIENT
Start: 2020-11-16 | End: 2020-11-16 | Stop reason: HOSPADM

## 2020-11-16 RX ORDER — BACITRACIN ZINC AND POLYMYXIN B SULFATE 500; 10000 [USP'U]/G; [USP'U]/G
0.5 OINTMENT OPHTHALMIC 2 TIMES DAILY
Qty: 1 TUBE | Refills: 0 | Status: SHIPPED | OUTPATIENT
Start: 2020-11-16 | End: 2020-11-26

## 2020-11-16 RX ORDER — HYDROCODONE BITARTRATE AND ACETAMINOPHEN 5; 325 MG/1; MG/1
1 TABLET ORAL PRN
Status: DISCONTINUED | OUTPATIENT
Start: 2020-11-16 | End: 2020-11-16 | Stop reason: HOSPADM

## 2020-11-16 RX ORDER — SODIUM CHLORIDE 0.9 % (FLUSH) 0.9 %
10 SYRINGE (ML) INJECTION EVERY 12 HOURS SCHEDULED
Status: DISCONTINUED | OUTPATIENT
Start: 2020-11-16 | End: 2020-11-16 | Stop reason: HOSPADM

## 2020-11-16 RX ORDER — TRAMADOL HYDROCHLORIDE 50 MG/1
50 TABLET ORAL EVERY 6 HOURS PRN
Qty: 15 TABLET | Refills: 0 | Status: SHIPPED | OUTPATIENT
Start: 2020-11-16 | End: 2020-11-21

## 2020-11-16 RX ORDER — DIAPER,BRIEF,INFANT-TODD,DISP
EACH MISCELLANEOUS PRN
Status: DISCONTINUED | OUTPATIENT
Start: 2020-11-16 | End: 2020-11-16 | Stop reason: ALTCHOICE

## 2020-11-16 RX ORDER — FENTANYL CITRATE 50 UG/ML
INJECTION, SOLUTION INTRAMUSCULAR; INTRAVENOUS PRN
Status: DISCONTINUED | OUTPATIENT
Start: 2020-11-16 | End: 2020-11-16 | Stop reason: SDUPTHER

## 2020-11-16 RX ORDER — LIDOCAINE HYDROCHLORIDE AND EPINEPHRINE 10; 10 MG/ML; UG/ML
INJECTION, SOLUTION INFILTRATION; PERINEURAL PRN
Status: DISCONTINUED | OUTPATIENT
Start: 2020-11-16 | End: 2020-11-16 | Stop reason: ALTCHOICE

## 2020-11-16 RX ORDER — SODIUM CHLORIDE 0.9 % (FLUSH) 0.9 %
10 SYRINGE (ML) INJECTION PRN
Status: DISCONTINUED | OUTPATIENT
Start: 2020-11-16 | End: 2020-11-16 | Stop reason: HOSPADM

## 2020-11-16 RX ADMIN — Medication 2 G: at 11:33

## 2020-11-16 RX ADMIN — PROPOFOL 20 MG: 10 INJECTION, EMULSION INTRAVENOUS at 11:49

## 2020-11-16 RX ADMIN — LIDOCAINE HYDROCHLORIDE 40 MG: 20 INJECTION, SOLUTION EPIDURAL; INFILTRATION; INTRACAUDAL; PERINEURAL at 11:46

## 2020-11-16 RX ADMIN — FENTANYL CITRATE 50 MCG: 50 INJECTION, SOLUTION INTRAMUSCULAR; INTRAVENOUS at 12:49

## 2020-11-16 RX ADMIN — MIDAZOLAM 1 MG: 1 INJECTION INTRAMUSCULAR; INTRAVENOUS at 11:33

## 2020-11-16 RX ADMIN — SODIUM CHLORIDE: 9 INJECTION, SOLUTION INTRAVENOUS at 11:33

## 2020-11-16 RX ADMIN — MIDAZOLAM 1 MG: 1 INJECTION INTRAMUSCULAR; INTRAVENOUS at 12:21

## 2020-11-16 RX ADMIN — PROPOFOL 40 MG: 10 INJECTION, EMULSION INTRAVENOUS at 11:46

## 2020-11-16 RX ADMIN — PROPOFOL 20 MG: 10 INJECTION, EMULSION INTRAVENOUS at 11:52

## 2020-11-16 RX ADMIN — FENTANYL CITRATE 50 MCG: 50 INJECTION, SOLUTION INTRAMUSCULAR; INTRAVENOUS at 11:36

## 2020-11-16 RX ADMIN — PROPOFOL 40 MG: 10 INJECTION, EMULSION INTRAVENOUS at 12:53

## 2020-11-16 RX ADMIN — SODIUM CHLORIDE: 9 INJECTION, SOLUTION INTRAVENOUS at 13:30

## 2020-11-16 ASSESSMENT — PULMONARY FUNCTION TESTS
PIF_VALUE: 0

## 2020-11-16 ASSESSMENT — PAIN SCALES - GENERAL
PAINLEVEL_OUTOF10: 0

## 2020-11-16 ASSESSMENT — PAIN - FUNCTIONAL ASSESSMENT: PAIN_FUNCTIONAL_ASSESSMENT: 0-10

## 2020-11-16 NOTE — PROGRESS NOTES
INTRAOPERATIVE CONSULTATION (with FROZEN SECTION)    A. Right nasal lesion: Basal cell carcinoma, focally present at deep margin.

## 2020-11-16 NOTE — ANESTHESIA POSTPROCEDURE EVALUATION
Department of Anesthesiology  Postprocedure Note    Patient: Yair Hines  MRN: [id number]  YOB: 1950  Date of evaluation: 11/16/2020  Time:  2:10 PM     Procedure Summary     Date:  11/16/20 Room / Location:  [facility]    Anesthesia Start:  1133 Anesthesia Stop:  4777    Procedure:  EXCISION RIGHT NASAL LESION WITH RECONSTRUCTION WITH FROZEN SECTION   (PATHOLOGY NOTIFIED) (N/A Nose) Diagnosis:  (RIGHT NASAL SKIN LESION)    Surgeon:  Tricia Haywood DO Responsible Provider:  Cathi Benavides MD    Anesthesia Type:  MAC ASA Status:  3          Anesthesia Type: MAC    Jamie Phase I: Jamie Score: 10    Jamie Phase II: Jamie Score: 10    Last vitals: Reviewed and per EMR flowsheets.        Anesthesia Post Evaluation    Patient location during evaluation: PACU  Patient participation: complete - patient participated  Level of consciousness: awake and alert  Airway patency: patent  Nausea & Vomiting: no nausea and no vomiting  Complications: no  Cardiovascular status: hemodynamically stable  Respiratory status: acceptable  Hydration status: euvolemic

## 2020-11-16 NOTE — H&P
Subjective:      Patient ID:  Marianna Cotto is a 79 y.o. male.     HPI:     Hearing Loss  Patient presents today with complaints of hearing loss. Concern regarding hearing has been present for 1 years. He has not failed a prior hearing test.The patient reports difficulty hearing, turning up the [employer] [address] .               Patient does not have hearing aids at this time.   History of Head trauma: no               Description: none  History of surgery to the head/neck: no               Description:none  History of cerumen impaction: no  History of noise exposure: yes               Type: palacios  Spinning: no               Length of time:   Hearing loss: yes                Fluctuating: no  Aural pressure: no  Tinnitus:no  Otalgia:no     Past Medical History        Past Medical History:   Diagnosis Date    CAD (coronary artery disease)      Hypertension      STEMI (ST elevation myocardial infarction) ([facility] [address] 10/23/2018        Past Surgical History         Past Surgical History:   Procedure Laterality Date    CORONARY ANGIOPLASTY WITH STENT PLACEMENT   10/23/2018     3.5 x 26 Resolute José to mid RCA by Dr. Gagan Umanzor        Family History         Family History   Problem Relation Age of Onset    High Blood Pressure Mother      Heart Disease Mother      Dementia Mother      Diabetes Mother      Cancer Father 62         lung, tongue, brain    Heart Attack Sister      Heart Attack Brother          Social History   Social History            Socioeconomic History    Marital status:        Spouse name: None    Number of children: None    Years of education: None    Highest education level: None   Occupational History    None   Social Needs    Financial resource strain: None    Food insecurity       Worry: None       Inability: None    Transportation needs       Medical: None       Non-medical: None   Tobacco Use    Smoking status: Never Smoker    Smokeless tobacco: Never Used   Substance and Sexual Activity  Alcohol use: No       Comment: no alcohol x 50 years    Drug use: No    Sexual activity: Not Currently   Lifestyle    Physical activity       Days per week: None       Minutes per session: None    Stress: None   Relationships    Social connections       Talks on phone: None       Gets together: None       Attends Mandaen service: None       Active member of club or organization: None       Attends meetings of clubs or organizations: None       Relationship status: None    Intimate partner violence       Fear of current or ex partner: None       Emotionally abused: None       Physically abused: None       Forced sexual activity: None   Other Topics Concern    None   Social History Narrative     Drinks 2 cups of coffee daily. Allergies   Allergen Reactions    Lisinopril         Resulted in Cr increase over 60 percent            Review of Systems   Constitutional: Negative. Negative for appetite change. HENT: Positive for hearing loss. Eyes: Negative. Negative for pain, discharge and visual disturbance. Respiratory: Negative. Negative for apnea, chest tightness and shortness of breath. Cardiovascular: Negative. Negative for chest pain, palpitations and leg swelling. Gastrointestinal: Negative. Negative for abdominal pain, diarrhea and vomiting. Endocrine: Negative for cold intolerance, heat intolerance and polydipsia. Genitourinary: Negative. Negative for dysuria, flank pain and hematuria. Musculoskeletal: Negative. Negative for arthralgias, gait problem and neck pain. Skin: Negative. Negative for color change, pallor and rash. Allergic/Immunologic: Negative for environmental allergies, food allergies and immunocompromised state. Neurological: Negative. Negative for dizziness, numbness and headaches. Hematological: Negative for adenopathy. Psychiatric/Behavioral: Negative. Negative for behavioral problems and hallucinations.    All other systems reviewed and are negative.                    Objective:   Physical Exam  Vitals signs and nursing note reviewed. Constitutional:       Appearance: He is well-developed. HENT:      Head: Normocephalic and atraumatic. Right Ear: Tympanic membrane, ear canal and external ear normal. Decreased hearing noted. There is impacted cerumen. Left Ear: Tympanic membrane, ear canal and external ear normal. Decreased hearing noted. Nose: Nose normal. No signs of injury or laceration. Left Nostril: No septal hematoma. Right Turbinates: Not enlarged. Right Sinus: No maxillary sinus tenderness. Left Sinus: No maxillary sinus tenderness. Comments: 1.0 cm lesion on the nose raised and friable tissue     Mouth/Throat:      Pharynx: Uvula midline. Eyes:      Conjunctiva/sclera: Conjunctivae normal.      Pupils: Pupils are equal, round, and reactive to light. Neck:      Musculoskeletal: Normal range of motion and neck supple. Cardiovascular:      Rate and Rhythm: Normal rate and regular rhythm. Heart sounds: Normal heart sounds. Pulmonary:      Effort: Pulmonary effort is normal.      Breath sounds: Normal breath sounds. Abdominal:      General: Bowel sounds are normal.      Palpations: Abdomen is soft. Skin:     General: Skin is warm and dry. Neurological:      Mental Status: He is alert and oriented to person, place, and time.             Audiogram -                  Cerumen removal      Auditorycanal(s) right ear completely obstructed with cerumen. Cerumen was gently removed using soft plastic curette. Tympanic membranes are intact following the procedure. Auditory canals appear normal.                     Assessment:        Diagnosis Orders   1. Bilateral impacted cerumen  NV REMOVAL IMPACTED CERUMEN INSTRUMENTATION UNILAT   2. Sensorineural hearing loss (SNHL) of both ears  Ambulatory referral to Audiology   3. Skin lesion of face                             Plan:       I recommend:     Excision right nasal skin lesion with reconstruction ; local flap.     The procedure risks and benefits were discussed with the patient and family.  Pt and family understood and decided to proceed with the surgery.     Risks of any anesthesia are:  · Reactions to medicines   · Breathing problems  Risks of any surgery are:  · Bleeding   · Infection      Specific risks of surgery:           Hearing is diminished.     Pt does not have hearing aids at this time.     Pt is  a candidate for hearing aids and is  interested in discussing this with audiology.     Also discussed the importance of hearing protection from now on to preserve remaining hearing.      Call or return to clinic prn if these symptoms worsen or fail to improve as anticipated     Follow up in 1 year(s)

## 2020-11-16 NOTE — PROGRESS NOTES
CLINICAL PHARMACY NOTE: MEDS TO [address] [place] [address] Drive Select Patient?: No  Total # of Prescriptions Filled: 2   The following medications were delivered to the patient:  · Tramadol 50  · Cephalexin 250  Total # of Interventions Completed: 6  Time Spent (min): 45    Additional Documentation:

## 2020-11-16 NOTE — H&P
Mikael Camara was seen and re-examined preoperatively today, November 16, 2020. There was no substantial change in his physical and medical status. Patient is fit for the proposed surgical procedure. All questions were appropriately addressed and had no further questions regarding the risks, benefits, and alternatives of the procedure. Mikael Camara and family wished to proceed.     Mo Edwards DO  Resident Physician  [facility]  Otolaryngology Residency  11/16/2020  9:50 AM

## 2020-11-16 NOTE — ANESTHESIA PRE PROCEDURE
Department of Anesthesiology  Preprocedure Note       Name:  Cherise Diaz   Age:  79 y.o.  :  1950                                          MRN:  [id number]         Date:  2020      Surgeon: Kahlil Powers):  Je Lopez DO    Procedure: Procedure(s):  EXCISION RIGHT NASAL LESION WITH RECONSTRUCTION WITH FROZEN SECTION   (PATHOLOGY NOTIFIED)    Medications prior to admission:   Prior to Admission medications    Medication Sig Start Date End Date Taking?  Authorizing Provider   metoprolol succinate (TOPROL XL) 50 MG extended release tablet Take 50 mg by mouth 2 times daily Take am dos 20  Yes Historical Provider, MD   Cholecalciferol (VITAMIN D3) 50 MCG (2000) CAPS Take 1 capsule by mouth daily Ld    Yes Historical Provider, MD   clopidogrel (PLAVIX) 75 MG tablet Take 1 tablet by mouth daily  Patient taking differently: Take 75 mg by mouth daily Last taken 2020  Yes Kurt Cazares MD   isosorbide mononitrate (IMDUR) 30 MG extended release tablet Take 1 tablet by mouth daily  Patient taking differently: Take 30 mg by mouth daily Take am [facility] 20  Yes Kurt Cazares MD   amLODIPine (NORVASC) 10 MG tablet Take 1 tablet by mouth daily 20  Yes Kurt Cazares MD   famotidine (PEPCID) 20 MG tablet Take 1 tablet by mouth 2 times daily  Patient taking differently: Take 20 mg by mouth 2 times daily Take am dos 20  Yes Kurt Cazares MD   lisinopril (PRINIVIL;ZESTRIL) 20 MG tablet Take 20 mg by mouth daily   Yes Historical Provider, MD   aspirin 81 MG chewable tablet Take 1 tablet by mouth daily  Patient taking differently: Take 81 mg by mouth daily Ld 18  Yes Gabo Blanc DO   nitroGLYCERIN (NITROSTAT) 0.4 MG SL tablet Place 1 tablet under the tongue every 5 minutes as needed for Chest pain 18  Yes John Caraballo, APRN - CNP       Current medications:    Current Facility-Administered Medications   Medication Dose Route Frequency Provider Last Rate Last Dose    sodium chloride flush 0.9 % injection 10 mL  10 mL Intravenous 2 times per day Olafg DIMITRI Isaacs, DO        sodium chloride flush 0.9 % injection 10 mL  10 mL Intravenous PRN Darline Win, DO        ceFAZolin (ANCEF) 2 g in sterile water 20 mL IV syringe  2 g Intravenous On Call to [address] [place] [address], DO        HYDROcodone-acetaminophen (NORCO) 5-325 MG per tablet 1 tablet  1 tablet Oral PRN Zen Lee MD        Or    HYDROcodone-acetaminophen (NORCO) 5-325 MG per tablet 2 tablet  2 tablet Oral PRN Zen Lee MD           Allergies:     Allergies   Allergen Reactions    Lisinopril      Resulted in Cr increase over 60 percent       Problem List:    Patient Active Problem List   Diagnosis Code    [facility] I21.3    Essential hypertension I10    Coronary artery disease involving native coronary artery of native heart without angina pectoris I25.10    CKD (chronic kidney disease), stage III N18.30    [facility]AD [facility]peripheral artery [facility] I73.9    Neuropathy of both feet G57.93       Past Medical History:        Diagnosis Date    Bilateral leg and foot pain     CAD (coronary artery disease)     follows with Dr Verito Roberson Hypertension     Nasal mass 11/2020    STEMI (ST elevation myocardial infarction) ([facility] [address] 10/23/2018       Past Surgical History:        Procedure Laterality Date    CORONARY ANGIOPLASTY WITH STENT PLACEMENT  10/23/2018    3.5 x 26 Resolute José to mid RCA by Dr. Cherrie Tucker History:    Social History     Tobacco Use    Smoking status: Never Smoker    Smokeless tobacco: Never Used   Substance Use Topics    Alcohol use: No     Comment: no alcohol x 50 years                                Counseling given: Not Answered      Vital Signs (Current):   Vitals:    11/12/20 1657 11/16/20 0906   Pulse:  73   Temp:  97.5 °F (36.4 °C)   SpO2:  98%   Weight: 225 lb (102.1 kg) 225 lb (102.1 kg)   Height: 5' 8\" (1.727 m) 5' 8\" (1.727 m)                                              BP Readings from Last 3 Encounters:   10/20/20 (!) 145/94   09/30/20 128/80   07/16/20 (!) 150/94       NPO Status:                                                                                 BMI:   Wt Readings from Last 3 Encounters:   11/16/20 225 lb (102.1 kg)   10/20/20 225 lb (102.1 kg)   10/14/20 228 lb (103.4 kg)     Body mass index is 34.21 kg/m². CBC:   Lab Results   Component Value Date    WBC 6.9 08/04/2020    RBC 4.33 08/04/2020    HGB 13.9 08/04/2020    HCT 41.5 08/04/2020    MCV 95.8 08/04/2020    RDW 12.4 08/04/2020     08/04/2020       CMP:   Lab Results   Component Value Date     08/04/2020    K 5.1 08/04/2020    K 4.0 10/24/2018     08/04/2020    CO2 23 08/04/2020    BUN 21 08/04/2020    CREATININE 1.5 08/04/2020    GFRAA 56 08/04/2020    LABGLOM 46 08/04/2020    GLUCOSE 115 08/04/2020    PROT 7.2 08/04/2020    CALCIUM 10.0 08/04/2020    BILITOT 0.3 08/04/2020    ALKPHOS 96 08/04/2020    AST 11 08/04/2020    ALT 9 08/04/2020       POC Tests: No results for input(s): POCGLU, POCNA, POCK, POCCL, POCBUN, POCHEMO, POCHCT in the last 72 hours.     Coags:   Lab Results   Component Value Date    APTT 29.1 10/23/2018       HCG (If Applicable): No results found for: PREGTESTUR, PREGSERUM, HCG, HCGQUANT     ABGs: No results found for: PHART, PO2ART, LBC9YLW, PDE1KUL, BEART, H4MWPZGX     Type & Screen (If Applicable):  No results found for: LABABO, LABRH    Drug/Infectious Status (If Applicable):  No results found for: HIV, HEPCAB    COVID-19 Screening (If Applicable):   Lab Results   Component Value Date    COVID19 Not Detected 11/11/2020         Anesthesia Evaluation  Patient summary reviewed no history of anesthetic complications:   Airway: Mallampati: II  TM distance: >3 FB   Neck ROM: full   Dental:      Comment: Poor dentition    Pulmonary:Negative Pulmonary ROS breath sounds clear to auscultation                             Cardiovascular:    (+) hypertension:, past MI: > 6 months, CAD: obstructive, CABG/stent:,       ECG reviewed  Rhythm: regular  Rate: normal           Beta Blocker:  Dose within 24 Hrs         Neuro/Psych:   (+) neuromuscular disease (Neuropathy of both feet ):,             GI/Hepatic/Renal:   (+) renal disease: CRI,           Endo/Other:                      ROS comment: obese Abdominal:           Vascular:   + PVD, aortic or cerebral, . Anesthesia Plan      MAC     ASA 3     (Poor historian, sister helped with interview)  Induction: intravenous. Anesthetic plan and risks discussed with patient and sibling. Plan discussed with CRNA.                   Talat Sprague MD   11/16/2020

## 2020-11-17 NOTE — OP NOTE
[id number] [address]                                OPERATIVE REPORT    PATIENT NAME: Junior Curry                   :        1950  MED REC NO:   [id number]                            ROOM:  ACCOUNT NO:   [de-identified]                           ADMIT DATE: 2020  PROVIDER:     Nessa Trejo DO    DATE OF PROCEDURE:  2020    PREOPERATIVE DIAGNOSIS:  Right nasal alar mass. POSTOPERATIVE DIAGNOSIS:  Right nasal alar basal cell carcinoma. PROCEDURE PERFORMED:  Excisional biopsy of right nasal alar mass with  local tissues rearrangement. The defect was measured at 1 x 1 cm and  the margin was 0.2 mm and the defect was 1.7 x 1.7 cm. Total scar  length was 6 cm. SURGEON:  Rhoda Greene DO    ASSISTANT:  Nessa Trejo DO, PGY-4    EBL:  10 mL    IV FLUIDS:  800 mL    ANESTHESIA:  MAC. SPECIMEN:  Right alar lesion. COMPLICATIONS:  None. INDICATIONS FOR SURGERY:  The patient is a 70-year-old male with history  of enlarging right nasal alar mass. Surgical intervention was  recommended. The risks, benefits, and alternatives were discussed  including but not limited to pain, bleeding, seroma, hematoma, heavy  scarring, recurrence and the need for further surgery. The patient  voiced understanding and elected to proceed. He was seen in preop  holding area by Dr. Jackelin Porter. All questions were answered. Consent  was reviewed and signed. DESCRIPTION OF PROCEDURE:  The patient was brought to the operating  room, placed supine on the operating table. SCDs were placed. A  time-out was done and it was agreed by all parties present. Anesthesia  was induced without any complication. The patient was then prepped and  draped in usual sterile fashion. 5 mL of 1% lidocaine with 1:100,000  epinephrine were infiltrated at the marked site. Time was allowed for  medication to take effect.   By using a #15 blade scalpel, incision was  then made circumferentially at the marked site and further carried down  to the subcutaneous tissues. The wound was then lifted up from the  wound bed and marked in appropriate fashion with superior short stitch  and inferior long stitch and was sent for frozen section. Wound bed was  then treated with monopolar cautery for hemostasis. A bilobed flap was  then drawn. Then, the incision was done and by using a #15 blade scalpel,  the flap was with full-thickness down to the subcutaneous tissues. The  flap was then rotated into the wound defect and closed in multiple  layers and closed in deep dermal fashion with 4-0 Monocryl. The skin  was then closed with 5-0 fast gut in running locking fashion. The patient was then given  back to Anesthesia. He emerged from the anesthesia without any  complications and sent to PACU in stable condition. Dr. Anyi Cabrera was present and scrubbed throughout the entirety of the  case.         Mike Gan DO    D: 11/16/2020 15:01:32       T: 11/16/2020 20:51:15     [email or web address]  Job#: [id number]     Doc#: [id number]

## 2020-11-25 ENCOUNTER — OFFICE VISIT (OUTPATIENT)
Dept: ENT CLINIC | Age: 70
End: 2020-11-25

## 2020-11-25 VITALS — TEMPERATURE: 97.8 F

## 2020-11-25 PROCEDURE — 99024 POSTOP FOLLOW-UP VISIT: CPT | Performed by: OTOLARYNGOLOGY

## 2020-12-18 ENCOUNTER — OFFICE VISIT (OUTPATIENT)
Dept: ENT CLINIC | Age: 70
End: 2020-12-18

## 2020-12-18 VITALS — BODY MASS INDEX: 34.1 KG/M2 | TEMPERATURE: 97.8 F | WEIGHT: 225 LBS | HEIGHT: 68 IN

## 2020-12-18 PROCEDURE — 99024 POSTOP FOLLOW-UP VISIT: CPT | Performed by: OTOLARYNGOLOGY

## 2020-12-18 ASSESSMENT — ENCOUNTER SYMPTOMS
EYES NEGATIVE: 1
SHORTNESS OF BREATH: 0
DIARRHEA: 0
CHEST TIGHTNESS: 0
EYE DISCHARGE: 0
APNEA: 0
VOMITING: 0
ABDOMINAL PAIN: 0
EYE PAIN: 0
COLOR CHANGE: 0
GASTROINTESTINAL NEGATIVE: 1
RESPIRATORY NEGATIVE: 1

## 2020-12-18 NOTE — PROGRESS NOTES
Subjective:      Patient ID:  Dimitri Johnson is a 79 y.o. male. HPI:    Patient presents today for recheck. Condition has been present for 1 month(s).        Past Medical History:   Diagnosis Date    Bilateral leg and foot pain     CAD (coronary artery disease)     follows with Dr Nayeli Osuna Hypertension     Nasal mass 11/2020    STEMI (ST elevation myocardial infarction) ([address] 10/23/2018     Past Surgical History:   Procedure Laterality Date    CORONARY ANGIOPLASTY WITH STENT PLACEMENT  10/23/2018    3.5 x 26 Resolute [place] to mid RCA by Dr. Dedra Diehl 11/16/2020    EXCISION RIGHT NASAL LESION WITH RECONSTRUCTION WITH FROZEN SECTION performed by Whitney Gibson DO at [facility] OR     Family History   Problem Relation Age of Onset    High Blood Pressure Mother     Heart Disease Mother     Dementia Mother     Diabetes Mother     Cancer Father 62        lung, tongue, brain    Heart Attack Sister     Heart Attack Brother      Social History     Socioeconomic History    Marital status:      Spouse name: None    Number of children: None    Years of education: None    Highest education level: None   Occupational History    None   Social Needs    Financial resource strain: None    Food insecurity     Worry: None     Inability: None    Transportation needs     Medical: None     Non-medical: None   Tobacco Use    Smoking status: Never Smoker    Smokeless tobacco: Never Used   Substance and Sexual Activity    Alcohol use: No     Comment: no alcohol x 50 years    Drug use: No    Sexual activity: Not Currently   Lifestyle    Physical activity     Days per week: None     Minutes per session: None    Stress: None   Relationships    Social connections     Talks on phone: None     Gets together: None     Attends Buddhist service: None     Active member of club or organization: None     Attends meetings of clubs or organizations: None     Relationship status: None    injury or laceration. Left Nostril: No septal hematoma. Right Turbinates: Not enlarged. Right Sinus: No maxillary sinus tenderness. Left Sinus: No maxillary sinus tenderness. Comments: Incision healing well. Mouth/Throat:      Pharynx: Uvula midline. Eyes:      Conjunctiva/sclera: Conjunctivae normal.      Pupils: Pupils are equal, round, and reactive to light. Neck:      Musculoskeletal: Normal range of motion and neck supple. Cardiovascular:      Rate and Rhythm: Normal rate and regular rhythm. Heart sounds: Normal heart sounds. Pulmonary:      Effort: Pulmonary effort is normal.      Breath sounds: Normal breath sounds. Abdominal:      General: Bowel sounds are normal.      Palpations: Abdomen is soft. Skin:     General: Skin is warm and dry. Neurological:      Mental Status: He is alert and oriented to person, place, and time. Assessment:       Diagnosis Orders   1. Basal cell carcinoma (BCC) of dorsum of nose     2. Skin lesion of face     3. Bilateral impacted cerumen     4. Sensorineural hearing loss (SNHL) of both ears                Plan:      I would like to recheck the ears in a year.    Follow up in 10 month(s)

## 2021-01-22 ENCOUNTER — OFFICE VISIT (OUTPATIENT)
Dept: FAMILY MEDICINE CLINIC | Age: 71
End: 2021-01-22
Payer: MEDICARE

## 2021-01-22 VITALS
BODY MASS INDEX: 36.1 KG/M2 | SYSTOLIC BLOOD PRESSURE: 156 MMHG | HEIGHT: 67 IN | HEART RATE: 72 BPM | TEMPERATURE: 97 F | WEIGHT: 230 LBS | RESPIRATION RATE: 22 BRPM | DIASTOLIC BLOOD PRESSURE: 88 MMHG

## 2021-01-22 DIAGNOSIS — K21.9 GASTROESOPHAGEAL REFLUX DISEASE, UNSPECIFIED WHETHER ESOPHAGITIS PRESENT: ICD-10-CM

## 2021-01-22 DIAGNOSIS — I25.10 CORONARY ARTERY DISEASE INVOLVING NATIVE CORONARY ARTERY OF NATIVE HEART WITHOUT ANGINA PECTORIS: ICD-10-CM

## 2021-01-22 DIAGNOSIS — R94.131 ABNORMAL EMG: ICD-10-CM

## 2021-01-22 DIAGNOSIS — G57.93 NEUROPATHY OF BOTH FEET: Primary | ICD-10-CM

## 2021-01-22 DIAGNOSIS — I73.9 CLAUDICATION (HCC): ICD-10-CM

## 2021-01-22 DIAGNOSIS — I10 ESSENTIAL HYPERTENSION: ICD-10-CM

## 2021-01-22 DIAGNOSIS — M79.605 PAIN IN BOTH LOWER EXTREMITIES: ICD-10-CM

## 2021-01-22 DIAGNOSIS — M48.061 SPINAL STENOSIS OF LUMBAR REGION, UNSPECIFIED WHETHER NEUROGENIC CLAUDICATION PRESENT: ICD-10-CM

## 2021-01-22 DIAGNOSIS — M79.604 PAIN IN BOTH LOWER EXTREMITIES: ICD-10-CM

## 2021-01-22 DIAGNOSIS — G62.9 PERIPHERAL POLYNEUROPATHY: ICD-10-CM

## 2021-01-22 PROCEDURE — 99214 OFFICE O/P EST MOD 30 MIN: CPT | Performed by: FAMILY MEDICINE

## 2021-01-22 PROCEDURE — G8510 SCR DEP NEG, NO PLAN REQD: HCPCS | Performed by: FAMILY MEDICINE

## 2021-01-22 RX ORDER — ISOSORBIDE MONONITRATE 30 MG/1
30 TABLET, EXTENDED RELEASE ORAL DAILY
Qty: 90 TABLET | Refills: 1 | Status: SHIPPED
Start: 2021-01-22 | End: 2021-07-20 | Stop reason: SDUPTHER

## 2021-01-22 RX ORDER — PREDNISONE 10 MG/1
TABLET ORAL
Qty: 50 TABLET | Refills: 0 | Status: SHIPPED
Start: 2021-01-22 | End: 2021-11-17 | Stop reason: ALTCHOICE

## 2021-01-22 RX ORDER — AMLODIPINE BESYLATE 10 MG/1
10 TABLET ORAL DAILY
Qty: 90 TABLET | Refills: 1 | Status: SHIPPED
Start: 2021-01-22 | End: 2021-07-20 | Stop reason: SDUPTHER

## 2021-01-22 RX ORDER — FAMOTIDINE 20 MG/1
20 TABLET, FILM COATED ORAL 2 TIMES DAILY
Qty: 180 TABLET | Refills: 1 | Status: SHIPPED
Start: 2021-01-22 | End: 2021-07-20 | Stop reason: SDUPTHER

## 2021-01-22 RX ORDER — CLOPIDOGREL BISULFATE 75 MG/1
75 TABLET ORAL DAILY
Qty: 90 TABLET | Refills: 1 | Status: SHIPPED
Start: 2021-01-22 | End: 2021-07-20 | Stop reason: SDUPTHER

## 2021-01-22 ASSESSMENT — PATIENT HEALTH QUESTIONNAIRE - PHQ9
SUM OF ALL RESPONSES TO PHQ QUESTIONS 1-9: 0
1. LITTLE INTEREST OR PLEASURE IN DOING THINGS: 0
2. FEELING DOWN, DEPRESSED OR HOPELESS: 0
SUM OF ALL RESPONSES TO PHQ QUESTIONS 1-9: 0

## 2021-01-22 NOTE — PROGRESS NOTES
10 MG tablet; Take 1 tablet by mouth daily    Claudication (HCC)  -     clopidogrel (PLAVIX) 75 MG tablet; Take 1 tablet by mouth daily    Coronary artery disease involving native coronary artery of native heart without angina pectoris  -     isosorbide mononitrate (IMDUR) 30 MG extended release tablet; Take 1 tablet by mouth daily  -     clopidogrel (PLAVIX) 75 MG tablet; Take 1 tablet by mouth daily    Gastroesophageal reflux disease, unspecified whether esophagitis present  -     famotidine (PEPCID) 20 MG tablet; Take 1 tablet by mouth 2 times daily    Peripheral polyneuropathy  -     MRI LUMBAR SPINE WO CONTRAST; Future  -     predniSONE (DELTASONE) 10 MG tablet; 5 tab daily x 3 d, then 4 tab daily x 3 d, then 3 tab daily x 3 d, then 2 tab daily x 3 d, then 1 tab daily x 3 d, then stop. Pain in both lower extremities  -     MRI LUMBAR SPINE WO CONTRAST; Future  -     predniSONE (DELTASONE) 10 MG tablet; 5 tab daily x 3 d, then 4 tab daily x 3 d, then 3 tab daily x 3 d, then 2 tab daily x 3 d, then 1 tab daily x 3 d, then stop. Abnormal EMG  -     MRI LUMBAR SPINE WO CONTRAST; Future    Spinal stenosis of lumbar region, unspecified whether neurogenic claudication present  -     MRI LUMBAR SPINE WO CONTRAST; Future  -     predniSONE (DELTASONE) 10 MG tablet; 5 tab daily x 3 d, then 4 tab daily x 3 d, then 3 tab daily x 3 d, then 2 tab daily x 3 d, then 1 tab daily x 3 d, then stop. DDx: Spinal stenosis versus claudication versus less likely RLS. Return in about 6 months (around 7/22/2021) for Medicare annual wellness visit.         Electronically signed by Elil Dove MD on 1/22/2021 at 8:47 AM

## 2021-02-03 ENCOUNTER — HOSPITAL ENCOUNTER (OUTPATIENT)
Dept: MRI IMAGING | Age: 71
Discharge: HOME | End: 2021-02-05
Payer: MEDICARE

## 2021-02-03 DIAGNOSIS — M79.604 PAIN IN BOTH LOWER EXTREMITIES: ICD-10-CM

## 2021-02-03 DIAGNOSIS — M79.605 PAIN IN BOTH LOWER EXTREMITIES: ICD-10-CM

## 2021-02-03 DIAGNOSIS — R94.131 ABNORMAL EMG: ICD-10-CM

## 2021-02-03 DIAGNOSIS — G62.9 PERIPHERAL POLYNEUROPATHY: ICD-10-CM

## 2021-02-03 DIAGNOSIS — M48.061 SPINAL STENOSIS OF LUMBAR REGION, UNSPECIFIED WHETHER NEUROGENIC CLAUDICATION PRESENT: ICD-10-CM

## 2021-02-03 PROCEDURE — 72148 MRI LUMBAR SPINE W/O DYE: CPT

## 2021-02-04 ENCOUNTER — IMMUNIZATION (OUTPATIENT)
Dept: PRIMARY CARE CLINIC | Age: 71
End: 2021-02-04
Payer: MEDICARE

## 2021-02-04 PROCEDURE — 91300 COVID-19, PFIZER VACCINE 30MCG/0.3ML DOSE: CPT | Performed by: NURSE PRACTITIONER

## 2021-02-04 PROCEDURE — 0001A COVID-19, PFIZER VACCINE 30MCG/0.3ML DOSE: CPT | Performed by: NURSE PRACTITIONER

## 2021-02-08 ENCOUNTER — HOSPITAL ENCOUNTER (OUTPATIENT)
Age: 71
Discharge: HOME | End: 2021-02-08
Payer: MEDICARE

## 2021-02-08 DIAGNOSIS — M48.061 SPINAL STENOSIS AT L4-L5 LEVEL: Primary | ICD-10-CM

## 2021-02-08 LAB
ALBUMIN SERPL-MCNC: 4 G/DL (ref 3.5–5.2)
ALP BLD-CCNC: 74 U/L (ref 40–129)
ALT SERPL-CCNC: 11 U/L (ref 0–40)
ANION GAP SERPL CALCULATED.3IONS-SCNC: 8 MMOL/L (ref 7–16)
AST SERPL-CCNC: 9 U/L (ref 0–39)
BASOPHILS ABSOLUTE: 0.04 E9/L (ref 0–0.2)
BASOPHILS RELATIVE PERCENT: 0.3 % (ref 0–2)
BILIRUB SERPL-MCNC: 0.4 MG/DL (ref 0–1.2)
BUN BLDV-MCNC: 26 MG/DL (ref 8–23)
CALCIUM SERPL-MCNC: 9.9 MG/DL (ref 8.6–10.2)
CHLORIDE BLD-SCNC: 105 MMOL/L (ref 98–107)
CHOLESTEROL, TOTAL: 150 MG/DL (ref 0–199)
CO2: 29 MMOL/L (ref 22–29)
CREAT SERPL-MCNC: 1.7 MG/DL (ref 0.7–1.2)
EOSINOPHILS ABSOLUTE: 0.12 E9/L (ref 0.05–0.5)
EOSINOPHILS RELATIVE PERCENT: 1 % (ref 0–6)
GFR AFRICAN AMERICAN: 48
GFR NON-AFRICAN AMERICAN: 40 ML/MIN/1.73
GLUCOSE BLD-MCNC: 104 MG/DL (ref 74–99)
HBA1C MFR BLD: 6.4 % (ref 4–5.6)
HCT VFR BLD CALC: 45.5 % (ref 37–54)
HDLC SERPL-MCNC: 54 MG/DL
HEMOGLOBIN: 14.3 G/DL (ref 12.5–16.5)
IMMATURE GRANULOCYTES #: 0.08 E9/L
IMMATURE GRANULOCYTES %: 0.7 % (ref 0–5)
LDL CHOLESTEROL CALCULATED: 69 MG/DL (ref 0–99)
LYMPHOCYTES ABSOLUTE: 3.39 E9/L (ref 1.5–4)
LYMPHOCYTES RELATIVE PERCENT: 28.6 % (ref 20–42)
MCH RBC QN AUTO: 30 PG (ref 26–35)
MCHC RBC AUTO-ENTMCNC: 31.4 % (ref 32–34.5)
MCV RBC AUTO: 95.6 FL (ref 80–99.9)
MONOCYTES ABSOLUTE: 0.85 E9/L (ref 0.1–0.95)
MONOCYTES RELATIVE PERCENT: 7.2 % (ref 2–12)
NEUTROPHILS ABSOLUTE: 7.38 E9/L (ref 1.8–7.3)
NEUTROPHILS RELATIVE PERCENT: 62.2 % (ref 43–80)
PDW BLD-RTO: 12.9 FL (ref 11.5–15)
PLATELET # BLD: 234 E9/L (ref 130–450)
PMV BLD AUTO: 9.3 FL (ref 7–12)
POTASSIUM SERPL-SCNC: 5 MMOL/L (ref 3.5–5)
RBC # BLD: 4.76 E12/L (ref 3.8–5.8)
SODIUM BLD-SCNC: 142 MMOL/L (ref 132–146)
TOTAL PROTEIN: 6.9 G/DL (ref 6.4–8.3)
TRIGL SERPL-MCNC: 134 MG/DL (ref 0–149)
URIC ACID, SERUM: 6.7 MG/DL (ref 3.4–7)
VITAMIN D 25-HYDROXY: 26 NG/ML (ref 30–100)
VLDLC SERPL CALC-MCNC: 27 MG/DL
WBC # BLD: 11.9 E9/L (ref 4.5–11.5)

## 2021-02-08 PROCEDURE — 36415 COLL VENOUS BLD VENIPUNCTURE: CPT

## 2021-02-08 PROCEDURE — 85025 COMPLETE CBC W/AUTO DIFF WBC: CPT

## 2021-02-08 PROCEDURE — 84550 ASSAY OF BLOOD/URIC ACID: CPT

## 2021-02-08 PROCEDURE — 80053 COMPREHEN METABOLIC PANEL: CPT

## 2021-02-08 PROCEDURE — 80061 LIPID PANEL: CPT

## 2021-02-08 PROCEDURE — 83036 HEMOGLOBIN GLYCOSYLATED A1C: CPT

## 2021-02-08 PROCEDURE — 82306 VITAMIN D 25 HYDROXY: CPT

## 2021-03-01 ENCOUNTER — IMMUNIZATION (OUTPATIENT)
Dept: PRIMARY CARE CLINIC | Age: 71
End: 2021-03-01
Payer: MEDICARE

## 2021-03-01 PROCEDURE — 91300 COVID-19, PFIZER VACCINE 30MCG/0.3ML DOSE: CPT | Performed by: NURSE PRACTITIONER

## 2021-03-01 PROCEDURE — 0002A COVID-19, PFIZER VACCINE 30MCG/0.3ML DOSE: CPT | Performed by: NURSE PRACTITIONER

## 2021-03-22 RX ORDER — ATORVASTATIN CALCIUM 40 MG/1
40 TABLET, FILM COATED ORAL DAILY
Qty: 90 TABLET | Refills: 1 | Status: SHIPPED
Start: 2021-03-22 | End: 2021-09-14 | Stop reason: SDUPTHER

## 2021-03-23 NOTE — TELEPHONE ENCOUNTER
Thank you for the quick response! [facility] states patient picked up atorvastatin yesterday. Billed through [facility]. Co-pay $0.00. Will sign off.      Biju Herrera, PharmD, [facility]  Direct: [phone]  Department, toll free [phone], option 7          For Pharmacy Admin Tracking Only    PHSO: Yes  Total # of Interventions Recommended: 1  - New Order #: 1 New Medication Order Reason(s): Needs Additional Medication Therapy  - Maintenance Safety Lab Monitoring #: 1  Recommended intervention potential cost savings: 1  Total Interventions Accepted: 1  Time Spent (min): 20
LDLCALC 69 02/08/2021     ALT   Date Value Ref Range Status   02/08/2021 11 0 - 40 U/L Final     AST   Date Value Ref Range Status   02/08/2021 9 0 - 39 U/L Final       The ASCVD Risk score (Bo Hernandez, et al., 2013) failed to calculate for the following reasons: The patient has a prior MI or stroke diagnosis     Hyperlipidemia Goal: Patient is not currently prescribed any-intensity statin therapy. PLAN:  Reached patient to review. Patient unsure why he stopped taking atorvastatin - possibly due to leg pain? Patient states when he stopped therapy - his leg pain did not resolve. Later in the conversation, patient states that his nephrologist told him to only take one-half (40 mg) of his atorvastatin. No documentation in the patient's chart to confirm this. Given patient's most lipid panel results, atorvastatin 40 mg daily is appropriate. Advised patient I will ask his PCP for a new prescription to be sent to the pharmacy so he can pick it up. Patient agreeable.      Yeny iRvas, PharmD, [facility]  Direct: [phone]  Department, toll free [phone], option 7

## 2021-07-20 ENCOUNTER — OFFICE VISIT (OUTPATIENT)
Dept: FAMILY MEDICINE CLINIC | Age: 71
End: 2021-07-20
Payer: MEDICARE

## 2021-07-20 VITALS
WEIGHT: 233 LBS | BODY MASS INDEX: 36.57 KG/M2 | HEIGHT: 67 IN | SYSTOLIC BLOOD PRESSURE: 126 MMHG | HEART RATE: 56 BPM | RESPIRATION RATE: 20 BRPM | OXYGEN SATURATION: 98 % | DIASTOLIC BLOOD PRESSURE: 83 MMHG | TEMPERATURE: 97.3 F

## 2021-07-20 DIAGNOSIS — G57.93 NEUROPATHY OF BOTH FEET: ICD-10-CM

## 2021-07-20 DIAGNOSIS — K21.9 GASTROESOPHAGEAL REFLUX DISEASE, UNSPECIFIED WHETHER ESOPHAGITIS PRESENT: ICD-10-CM

## 2021-07-20 DIAGNOSIS — I73.9 CLAUDICATION (HCC): ICD-10-CM

## 2021-07-20 DIAGNOSIS — I25.10 CORONARY ARTERY DISEASE INVOLVING NATIVE CORONARY ARTERY OF NATIVE HEART WITHOUT ANGINA PECTORIS: ICD-10-CM

## 2021-07-20 DIAGNOSIS — M48.061 SPINAL STENOSIS AT L4-L5 LEVEL: Primary | ICD-10-CM

## 2021-07-20 DIAGNOSIS — I10 ESSENTIAL HYPERTENSION: ICD-10-CM

## 2021-07-20 PROCEDURE — 99214 OFFICE O/P EST MOD 30 MIN: CPT | Performed by: FAMILY MEDICINE

## 2021-07-20 RX ORDER — LISINOPRIL 20 MG/1
20 TABLET ORAL DAILY
Qty: 90 TABLET | Refills: 1 | Status: SHIPPED
Start: 2021-07-20 | End: 2022-01-20 | Stop reason: SDUPTHER

## 2021-07-20 RX ORDER — FAMOTIDINE 20 MG/1
20 TABLET, FILM COATED ORAL 2 TIMES DAILY
Qty: 180 TABLET | Refills: 1 | Status: SHIPPED
Start: 2021-07-20 | End: 2022-01-20 | Stop reason: SDUPTHER

## 2021-07-20 RX ORDER — METOPROLOL SUCCINATE 50 MG/1
50 TABLET, EXTENDED RELEASE ORAL 2 TIMES DAILY
Qty: 180 TABLET | Refills: 1 | Status: SHIPPED
Start: 2021-07-20 | End: 2022-01-20 | Stop reason: SDUPTHER

## 2021-07-20 RX ORDER — OMEPRAZOLE 40 MG/1
40 CAPSULE, DELAYED RELEASE ORAL 2 TIMES DAILY
Qty: 60 CAPSULE | Refills: 0 | Status: SHIPPED
Start: 2021-07-20 | End: 2022-01-20 | Stop reason: SDUPTHER

## 2021-07-20 RX ORDER — ASPIRIN 81 MG/1
81 TABLET, CHEWABLE ORAL DAILY
Qty: 30 TABLET | Refills: 3 | Status: CANCELLED | OUTPATIENT
Start: 2021-07-20

## 2021-07-20 RX ORDER — ISOSORBIDE MONONITRATE 30 MG/1
30 TABLET, EXTENDED RELEASE ORAL DAILY
Qty: 90 TABLET | Refills: 1 | Status: SHIPPED
Start: 2021-07-20 | End: 2022-01-20 | Stop reason: SDUPTHER

## 2021-07-20 RX ORDER — CLOPIDOGREL BISULFATE 75 MG/1
75 TABLET ORAL DAILY
Qty: 90 TABLET | Refills: 1 | Status: SHIPPED
Start: 2021-07-20 | End: 2022-01-20 | Stop reason: SDUPTHER

## 2021-07-20 RX ORDER — AMLODIPINE BESYLATE 10 MG/1
10 TABLET ORAL DAILY
Qty: 90 TABLET | Refills: 1 | Status: SHIPPED
Start: 2021-07-20 | End: 2022-01-20 | Stop reason: SDUPTHER

## 2021-07-20 NOTE — PROGRESS NOTES
Progress Note    Subjective:   Hypertension. Controlled. Bilateral lower extremity numbness and tingling and pain in the feet. MRI lumbar spine showed severe central canal stenosis L4-L5 and severe neuroforaminal stenosis L5-S1. Never heard from neurosurgeon. Prednisone did not help. Raised his blood pressure and did not relieve his symptoms. Lately complaining of much more acid reflux. Not controlled with twice daily Pepcid.     CAD. On Imdur, metoprolol, lisinopril, atorvastatin, Plavix, aspirin. No chest pain or shortness of breath. CKD. Follows with nephro. On lisinopril. Decline pneumonia shot. Health Maintenance Due   Topic Date Due    DTaP/Tdap/Td vaccine (1 - Tdap) Never done    Shingles Vaccine (1 of 2) Never done    Pneumococcal 65+ years Vaccine (1 of 1 - PPSV23) Never done   [facility] Annual Wellness Visit (AWV)  Never done           Objective:   /83 (Site: Left Upper Arm, Position: Sitting, Cuff Size: Large Adult)   Pulse 56   Temp 97.3 °F (36.3 °C) (Temporal)   Resp 20   Ht 5' 7\" (1.702 m)   Wt 233 lb (105.7 kg)   SpO2 98%   BMI 36.49 kg/m²   General appearance: NAD, alert and interacting appropriately  HEENT: NCAT, PERRLA, EOMI   Resp: CTAB, no WRC  CVS: RRR, no MRG  Abdomen: BS +, SNDNT  Extremities: No clubbing, cyanosis, or edema. Warm. Dry. I have reviewed this patient's previous records. I have reviewed this patient's labs. I have reviewed this patient's imaging reports. I have reviewed this patient's medications. Assessment/Plan:    Diagnoses and all orders for this visit:    Spinal stenosis at L4-L5 level  -     ([facility]) - Ann Cortez MD, [facility] ([facility])    Essential hypertension  -     amLODIPine (NORVASC) 10 MG tablet; Take 1 tablet by mouth daily  -     lisinopril (PRINIVIL;ZESTRIL) 20 MG tablet; Take 1 tablet by mouth daily  -     metoprolol succinate (TOPROL XL) 50 MG extended release tablet;  Take 1 tablet by mouth 2 times daily Take am dos 11/16    Coronary artery disease involving native coronary artery of native heart without angina pectoris  -     isosorbide mononitrate (IMDUR) 30 MG extended release tablet; Take 1 tablet by mouth daily  -     clopidogrel (PLAVIX) 75 MG tablet; Take 1 tablet by mouth daily  -     metoprolol succinate (TOPROL XL) 50 MG extended release tablet; Take 1 tablet by mouth 2 times daily Take am dos 11/16    Claudication [facility])  -     clopidogrel (PLAVIX) 75 MG tablet; Take 1 tablet by mouth daily    Gastroesophageal reflux disease, unspecified whether esophagitis present  -     famotidine (PEPCID) 20 MG tablet; Take 1 tablet by mouth 2 times daily  -     omeprazole (PRILOSEC) 40 MG delayed release capsule; Take 1 capsule by mouth 2 times daily    Neuropathy of both feet  -     ([facility]) - Dale Smith MD, [facility] ([place])            Patient Instructions   Use omeprazole twice daily for one month, then go back to pepcid. Return in about 6 months (around 1/20/2022) for hypertension.,  But advised patient call me in 1 week to update me on whether he is in with neurosurgery or not. Patient agreed.           Electronically signed by Milena Guerra MD on 7/20/2021 at 8:36 AM

## 2021-07-26 ENCOUNTER — HOSPITAL ENCOUNTER (OUTPATIENT)
Age: 71
Discharge: HOME | End: 2021-07-26
Payer: MEDICARE

## 2021-07-26 LAB
ALBUMIN SERPL-MCNC: 3.7 G/DL (ref 3.5–5.2)
ANION GAP SERPL CALCULATED.3IONS-SCNC: 10 MMOL/L (ref 7–16)
BASOPHILS ABSOLUTE: 0.03 E9/L (ref 0–0.2)
BASOPHILS RELATIVE PERCENT: 0.4 % (ref 0–2)
BUN BLDV-MCNC: 22 MG/DL (ref 6–23)
CALCIUM SERPL-MCNC: 9.6 MG/DL (ref 8.6–10.2)
CHLORIDE BLD-SCNC: 108 MMOL/L (ref 98–107)
CO2: 23 MMOL/L (ref 22–29)
CREAT SERPL-MCNC: 1.5 MG/DL (ref 0.7–1.2)
EOSINOPHILS ABSOLUTE: 0.11 E9/L (ref 0.05–0.5)
EOSINOPHILS RELATIVE PERCENT: 1.4 % (ref 0–6)
GFR AFRICAN AMERICAN: 56
GFR NON-AFRICAN AMERICAN: 46 ML/MIN/1.73
GLUCOSE BLD-MCNC: 115 MG/DL (ref 74–99)
HCT VFR BLD CALC: 39.7 % (ref 37–54)
HEMOGLOBIN: 13.1 G/DL (ref 12.5–16.5)
IMMATURE GRANULOCYTES #: 0.03 E9/L
IMMATURE GRANULOCYTES %: 0.4 % (ref 0–5)
LYMPHOCYTES ABSOLUTE: 2.22 E9/L (ref 1.5–4)
LYMPHOCYTES RELATIVE PERCENT: 29.1 % (ref 20–42)
MCH RBC QN AUTO: 30.7 PG (ref 26–35)
MCHC RBC AUTO-ENTMCNC: 33 % (ref 32–34.5)
MCV RBC AUTO: 93 FL (ref 80–99.9)
MONOCYTES ABSOLUTE: 0.69 E9/L (ref 0.1–0.95)
MONOCYTES RELATIVE PERCENT: 9 % (ref 2–12)
NEUTROPHILS ABSOLUTE: 4.55 E9/L (ref 1.8–7.3)
NEUTROPHILS RELATIVE PERCENT: 59.7 % (ref 43–80)
PDW BLD-RTO: 12.6 FL (ref 11.5–15)
PHOSPHORUS: 2.9 MG/DL (ref 2.5–4.5)
PLATELET # BLD: 203 E9/L (ref 130–450)
PMV BLD AUTO: 9.1 FL (ref 7–12)
POTASSIUM SERPL-SCNC: 5 MMOL/L (ref 3.5–5)
RBC # BLD: 4.27 E12/L (ref 3.8–5.8)
SODIUM BLD-SCNC: 141 MMOL/L (ref 132–146)
WBC # BLD: 7.6 E9/L (ref 4.5–11.5)

## 2021-07-26 PROCEDURE — 80069 RENAL FUNCTION PANEL: CPT

## 2021-07-26 PROCEDURE — 36415 COLL VENOUS BLD VENIPUNCTURE: CPT

## 2021-07-26 PROCEDURE — 85025 COMPLETE CBC W/AUTO DIFF WBC: CPT

## 2021-08-17 ENCOUNTER — EVALUATION (OUTPATIENT)
Dept: PHYSICAL THERAPY | Age: 71
End: 2021-08-17
Payer: MEDICARE

## 2021-08-17 DIAGNOSIS — M54.16 LUMBAR RADICULOPATHY: ICD-10-CM

## 2021-08-17 DIAGNOSIS — M48.061 SPINAL STENOSIS OF LUMBAR REGION, UNSPECIFIED WHETHER NEUROGENIC CLAUDICATION PRESENT: ICD-10-CM

## 2021-08-17 DIAGNOSIS — M47.816 LUMBAR SPONDYLOSIS: Primary | ICD-10-CM

## 2021-08-17 PROCEDURE — 97161 PT EVAL LOW COMPLEX 20 MIN: CPT | Performed by: PHYSICAL THERAPIST

## 2021-08-17 NOTE — PROGRESS NOTES
[facility]          Phone: [phone] Fax: [phone]    Physical Therapy Daily Treatment Note  Date:  2021    Patient Name:  Mishel Owen    :  1950  MRN: <[id number]>    Restrictions/Precautions:    Diagnosis:     Diagnosis Orders   1. Lumbar spondylosis     2. Lumbar radiculopathy     3.  Spinal stenosis of lumbar region, unspecified whether neurogenic claudication present       Treatment Diagnosis:    Insurance/Certification information:  [employer] Medicare  Referring Physician:  Luis F Cabral MD  Plan of care signed (Y/N):    Visit# / total visits:    Pain level: 5-7/10   Time In:  1025  Time Out:  1100    Subjective:  See evaluation    Exercises:  Exercise/Equipment Resistance/Repetitions Other comments     Bike/stepper       Trunk stretch with ball       Hamstring stretch       Lower trunk rotation       Pelvic tilts       Prone prop        Scapular retraction/rows                                                                                              Other Therapeutic Activities:  PT evaluation completed    Home Exercise Program:  N/A    Manual Treatments:  N/A    Modalities:  N/A     Time-in Time-out Total Time   48421  Evaluation Low Complexity 1025 1100 35   65713  Evaluation Med Complexity      90754  Evaluation High Complexity      98144  Ther Ex      21657  Neuro Re-ed        76753  Ther Activities        82547  Manual Therapy       99646  E-stim       37036  Ultrasound            Session 1025 1100 35       Treatment/Activity Tolerance:  [x] Patient tolerated treatment well [] Patient limited by fatigue  [] Patient limited by pain  [] Patient limited by other medical complications  [] Other:     Prognosis: [x] Good [] Fair  [] Poor    Patient Requires Follow-up: [x] Yes  [] No    Plan:   [] Continue per plan of care [] Alter current plan (see comments)  [x] Plan of care initiated [] Hold pending MD visit [] Discharge  Plan for Next Session: Electronically signed by:  Pia Degroot [place], [id number]

## 2021-08-17 NOTE — PROGRESS NOTES
1 tablet by mouth daily 90 tablet 1    isosorbide mononitrate (IMDUR) 30 MG extended release tablet Take 1 tablet by mouth daily 90 tablet 1    clopidogrel (PLAVIX) 75 MG tablet Take 1 tablet by mouth daily 90 tablet 1    famotidine (PEPCID) 20 MG tablet Take 1 tablet by mouth 2 times daily 180 tablet 1    lisinopril (PRINIVIL;ZESTRIL) 20 MG tablet Take 1 tablet by mouth daily 90 tablet 1    metoprolol succinate (TOPROL XL) 50 MG extended release tablet Take 1 tablet by mouth 2 times daily Take am dos 11/16 180 tablet 1    omeprazole (PRILOSEC) 40 MG delayed release capsule Take 1 capsule by mouth 2 times daily 60 capsule 0    atorvastatin (LIPITOR) 40 MG tablet Take 1 tablet by mouth daily 90 tablet 1    predniSONE (DELTASONE) 10 MG tablet 5 tab daily x 3 d, then 4 tab daily x 3 d, then 3 tab daily x 3 d, then 2 tab daily x 3 d, then 1 tab daily x 3 d, then stop. 50 tablet 0    Cholecalciferol (VITAMIN D3) 50 MCG (2000 UT) CAPS Take 1 capsule by mouth daily Ld 11/12      aspirin 81 MG chewable tablet Take 1 tablet by mouth daily (Patient taking differently: Take 81 mg by mouth daily Ld 11/09) 30 tablet 3    nitroGLYCERIN (NITROSTAT) 0.4 MG SL tablet Place 1 tablet under the tongue every 5 minutes as needed for Chest pain 25 tablet 1     No current facility-administered medications for this visit. Occupation: retired.      Exercise regimen: walking    Hobbies: none    Patient Goals: pain relief, relief of numbness    Contraindications/Precautions: none    OBJECTIVE:     Observations: well nourished male    Inspection: increased thoracic kyphosis, decreased lumbar lordosis, trunk flexed with standing and gait         Gait: wide-based, short step length, trunk flexed    Functional Strength: No significant deficits noted    Range of Motion:    Trunk:    Flexion:  [x] Normal   [] Limited    Extension:  [x] Normal   [] Limited     Right Rotation: [] Normal   [x] Limited grossly 50%   Left Rotation:  [] Normal   [x] Limited grossly 50%   Right Side Bending: [] Normal   [x] Limited grossly 50%   Left Side Bending: [] Normal   [x] Limited grossly 50%    Lower Extremity:   Right:   [x] Normal   [] Limited    Left:   [x] Normal   [] Limited       Strength:     Trunk: 4-/5   R LE: Hip 4-/5   L LE: Hip 4-/5    Palpation: No tenderness     Sensation: intermittent numbness in toes reported    Special Tests:   [] Nerve Root Compression           Right []+ / [] -    Left []+ / [] -  [] Slump           Right []+ / [] -    Left []+ / [] -  [] FADIR          Right []+ / [] -    Left []+ / [] -  [] S-I Distraction          Right []+ / [] -    Left []+ / [] -     [x] SLR           Right [x]+ / [] -    Left [x]+ / [] -     [] JEET          Right []+ / [] -    Left []+ / [] -  [] S-I Compression          Right []+ / [] -    Left []+ / [] -   [] Leg Length: []+ / [] -       Special Test Comments: pt with numbness in toes with SLR testing, but no radicular pain reported    ASSESSMENT     Outcome Measure:   Modified Oswestry 22% disability    Problems:    Pain reported 5-7/10   ROM decreased    Strength decreased   Decreased functional ability with walking, stairs    Reason for Skilled Care: Pt with degenerative changes in the spine causing LE pain and numbness, interfering with pt functional activities and pt quality of life. [x] There are no barriers affecting plan of care or recovery    [] Barriers to this patient's plan of care or recovery include.     Domestic Concerns:  [x] No  [] Yes:      Long Term goals (4 weeks)   Decrease reported pain to 2-3/10 at the worst   Increase ROM to 75% in lumbar spine   Increase Strength to 4 to 4+/5    Able to perform/complete the following functions/tasks: pt will report improved tolerance to walking with minimal numbness and pain in the LEs    Oswestry Low Back Disability Questionnaire 15% disability    Independent with Home Exercise Programs    Rehab Potential: [x] Good  [] Fair [] Poor    PLAN       Treatment Plan:   [x] Therapeutic Exercise  [x] Therapeutic Activity  [x] Neuromuscular Re-education   [] Gait Training  [] Balance Training  [] Aerobic conditioning  [] Manual Therapy  [] Massage/Fascial release   [] Work/Sport specific activities    [] Pain Neuroscience [] Cold/hotpack  [] Vasocompression  [] Electrical Stimulation  [] Lumbar/Cervical Traction  [] Ultrasound   [] Iontophoresis: 4 mg/mL Dexamethasone Sodium Phosphate 40-80 mAmin  [] Dry Needling      [x] Instruction in HEP      []  Medication allergies reviewed for use of Dexamethasone Sodium Phosphate 4mg/ml  with iontophoresis treatments. Patient is not allergic. The following CPT codes are likely to be used in the care of this patient: 99663 PT Evaluation: Low Complexity, 79955 PT Re-Evaluation, 35123 Therapeutic Exercise, [phone] Neuromuscular Re-Education, 26056 Therapeutic Activities and 59162 Manual Therapy      Suggested Professional Referral: [x] No  [] Yes:     Patient Education:  [x] Plans/Goals, Risks/Benefits discussed  [x] Home exercise program  Method of Education: [x] Verbal  [x] Demo  [x] Written  Comprehension of Education:  [x] Verbalizes understanding. [x] Demonstrates understanding. [] Needs Review. [] Demonstrates/verbalizes understanding of HEP/Ed previously given. Frequency:  2 days per week for 4 weeks    Patient understands diagnosis/prognosis and consents to treatment, plan and goals: [x] Yes    [] No     Thank you for the opportunity to work with your patient. If you have questions or comments, please contact me at numbers listed above. Electronically signed by: Ricky Jackson, [facility], [id number]    Medicare Patients Only     Please sign Physician's Certification and return to: [facility]  PHYSICAL THERAPY  [address]   [facility] [place] [address]  Dept: [phone]  Dept Fax: [phone] Certification / Comments

## 2021-08-24 ENCOUNTER — TREATMENT (OUTPATIENT)
Dept: PHYSICAL THERAPY | Age: 71
End: 2021-08-24
Payer: MEDICARE

## 2021-08-24 DIAGNOSIS — M48.061 SPINAL STENOSIS OF LUMBAR REGION, UNSPECIFIED WHETHER NEUROGENIC CLAUDICATION PRESENT: ICD-10-CM

## 2021-08-24 DIAGNOSIS — M54.16 LUMBAR RADICULOPATHY: ICD-10-CM

## 2021-08-24 DIAGNOSIS — M47.816 LUMBAR SPONDYLOSIS: Primary | ICD-10-CM

## 2021-08-24 PROCEDURE — 97110 THERAPEUTIC EXERCISES: CPT

## 2021-08-24 NOTE — PROGRESS NOTES
[facility]          Phone: [phone] Fax: [phone]    Physical Therapy Daily Treatment Note  Date:  2021    Patient Name:  Jean-Pierre Nielsen    :  1950  MRN: <[id number]>    Restrictions/Precautions:    Diagnosis:     Diagnosis Orders   1. Lumbar spondylosis     2. Lumbar radiculopathy     3. Spinal stenosis of lumbar region, unspecified whether neurogenic claudication present       Treatment Diagnosis:    Insurance/Certification information:  [employer] Medicare  Referring Physician:  Abelino Horton MD  Plan of care signed (Y/N):    Visit# / total visits:    Pain level: 0/10   Time In:  830  Time Out:  903    Subjective:  Pt reported he has no LBP today , just tingling in B feet, and L lateral calf soreness    Exercises:  Exercise/Equipment Resistance/Repetitions Other comments     /stepper X 10 mins       Trunk stretch with ball 20 sec x 3 reps       Hamstring stretch w/ PFB 20 sec x 3 reps B  HEP     Lower trunk rotation 15sec x 3 reps B   HEP     Pelvic tilts 5 sec x 10 reps   HEP     Prone prop  3 mins   HEP     Scapular retraction/rows 5 sec x 10 reps                                                                                              Other : pt performed above ex w/ good pacing, cuing for ex technique give to pt. Pt reported the tingling in his feet had lessoned, but L lateral calf remained sore.      Home Exercise Program:  : pt was provided w/ handout and reviewed w/ therapist. Pt demonstrated understanding of ex to therapist.     Manual Treatments:  N/A    Modalities:  N/A     Time-in Time-out Total Time   52977  Evaluation Low Complexity      56042  Evaluation Med Complexity      88712  Evaluation High Complexity      82408  Ther Ex 068 385 22   76592  Neuro Re-ed        14798  Ther Activities        34830  Manual Therapy       43158  E-stim       96117  Ultrasound            Session [phone] 99       Treatment/Activity

## 2021-08-26 ENCOUNTER — TREATMENT (OUTPATIENT)
Dept: PHYSICAL THERAPY | Age: 71
End: 2021-08-26
Payer: MEDICARE

## 2021-08-26 DIAGNOSIS — M47.816 LUMBAR SPONDYLOSIS: Primary | ICD-10-CM

## 2021-08-26 DIAGNOSIS — M54.16 LUMBAR RADICULOPATHY: ICD-10-CM

## 2021-08-26 DIAGNOSIS — M48.061 SPINAL STENOSIS OF LUMBAR REGION, UNSPECIFIED WHETHER NEUROGENIC CLAUDICATION PRESENT: ICD-10-CM

## 2021-08-26 PROCEDURE — 97110 THERAPEUTIC EXERCISES: CPT

## 2021-08-26 NOTE — PROGRESS NOTES
[facility]          Phone: [phone] Fax: [phone]    Physical Therapy Daily Treatment Note  Date:  2021    Patient Name:  Adelia Ramirez    :  1950  MRN: <[id number]>    Restrictions/Precautions:    Diagnosis:     Diagnosis Orders   1. Lumbar spondylosis     2. Lumbar radiculopathy     3. Spinal stenosis of lumbar region, unspecified whether neurogenic claudication present       Treatment Diagnosis:    Insurance/Certification information:  [employer] Medicare  Referring Physician:  Jaime Lutz MD  Plan of care signed (Y/N):    Visit# / total visits:  3/8  Pain level: 0/10   Time In:  830  Time Out:  905    Subjective:  Pt reported he has no LBP today , just continued tingling in B feet, and L lateral calf soreness. Pt reported compliance w/ HEP 1-2 x day    Exercises:  Exercise/Equipment Resistance/Repetitions Other comments     /stepper X 10 mins       Trunk stretch with ball 20 sec x 3 reps       Hamstring stretch w/ PFB 20 sec x 3 reps B  HEP     Lower trunk rotation 15sec x 3 reps B   HEP     Pelvic tilts 5 sec x 10 reps   HEP     Prone prop  5 mins   HEP     Scapular retraction/rows 5 sec x 10 reps       SLR  X 10 reps B                                                                                       Other : pt performed above ex w/ good pacing, cuing for ex technique give to pt. Pt reported the tingling in his feet had lessoned, but L lateral calf remained sore.      Home Exercise Program:  : pt was provided w/ handout and reviewed w/ therapist. Pt demonstrated understanding of ex to therapist.     Manual Treatments:  N/A    Modalities:  N/A     Time-in Time-out Total Time   40674  Evaluation Low Complexity      18162  Evaluation Med Complexity      17849  Evaluation High Complexity      66564  Ther Ex 574 775 07   67023  Neuro Re-ed          Ther Activities        [id number]  Manual Therapy       70949  [facility]  E-stim       [facility] Ultrasound            Session 135 171 93       Treatment/Activity Tolerance:  [x] Patient tolerated treatment well [] Patient limited by fatigue  [] Patient limited by pain  [] Patient limited by other medical complications  [] Other:     Prognosis: [x] Good [] Fair  [] Poor    Patient Requires Follow-up: [x] Yes  [] No    Plan:   [x] Continue per plan of care [] Alter current plan (see comments)  [] Plan of care initiated [] Hold pending MD visit [] Discharge  Plan for Next Session:        Electronically signed by:  Li Smith, PTA [id number]

## 2021-08-31 ENCOUNTER — TREATMENT (OUTPATIENT)
Dept: PHYSICAL THERAPY | Age: 71
End: 2021-08-31
Payer: MEDICARE

## 2021-08-31 DIAGNOSIS — M48.061 SPINAL STENOSIS OF LUMBAR REGION, UNSPECIFIED WHETHER NEUROGENIC CLAUDICATION PRESENT: ICD-10-CM

## 2021-08-31 DIAGNOSIS — M47.816 LUMBAR SPONDYLOSIS: Primary | ICD-10-CM

## 2021-08-31 DIAGNOSIS — M54.16 LUMBAR RADICULOPATHY: ICD-10-CM

## 2021-08-31 PROCEDURE — 97110 THERAPEUTIC EXERCISES: CPT

## 2021-08-31 NOTE — PROGRESS NOTES
[facility]          Phone: [phone] Fax: [phone]    Physical Therapy Daily Treatment Note  Date:  2021    Patient Name:  Troy Huffman    :  1950  MRN: <[id number]>    Restrictions/Precautions:    Diagnosis:     Diagnosis Orders   1. Lumbar spondylosis     2. Lumbar radiculopathy     3. Spinal stenosis of lumbar region, unspecified whether neurogenic claudication present       Treatment Diagnosis:    Insurance/Certification information:  [employer] Medicare  Referring Physician:  Rosa Maria Monson MD  Plan of care signed (Y/N):    Visit# / total visits:    Pain level: 3/10   Time In:  835  Time Out:  910    Subjective:  Pt reported he has mild LBP today , just continued tingling in B feet, and L lateral calf soreness. Pt reported compliance w/ HEP 1-2 x day. Pt reported he noticed he is able to walk at a better pace when taking his garbage out to the dumpster and is not fatigued , and the distance is approx 2000 ft round trip. He feels therapy is making a difference    Exercises:  Exercise/Equipment Resistance/Repetitions Other comments     /stepper X 10 mins       Trunk stretch with ball 20 sec x 3 reps       Hamstring stretch w/ PFB 20 sec x 3 reps B  HEP     Lower trunk rotation 15sec x 3 reps B   HEP     Pelvic tilts 5 sec x 10 reps   HEP     Prone prop  5 mins   HEP     Scapular retraction/rows 5 sec x 10 reps       SLR  X 10 reps B                                                                                       Other : pt performed above ex w/ good pacing. Pt reported the tingling in his feet had lessoned, but L lateral calf remained sore.      Home Exercise Program:  : pt was provided w/ handout and reviewed w/ therapist. Pt demonstrated understanding of ex to therapist.     Manual Treatments:  N/A    Modalities:  N/A     Time-in Time-out Total Time   91850  Evaluation Low Complexity      67114  Evaluation Med Complexity 23824  Evaluation High Complexity      76842  Ther Ex 835 910 35   95382  Neuro Re-ed        91034  Ther Activities        18266  Manual Therapy       55064  E-stim       56116  Ultrasound            Session 261 639 64       Treatment/Activity Tolerance:  [x] Patient tolerated treatment well [] Patient limited by fatigue  [] Patient limited by pain  [] Patient limited by other medical complications  [] Other:     Prognosis: [x] Good [] Fair  [] Poor    Patient Requires Follow-up: [x] Yes  [] No    Plan:   [x] Continue per plan of care [] Alter current plan (see comments)  [] Plan of care initiated [] Hold pending MD visit [] Discharge  Plan for Next Session:        Electronically signed by:  Nita De Leon, PTA [id number]

## 2021-09-03 ENCOUNTER — TELEPHONE (OUTPATIENT)
Dept: ADMINISTRATIVE | Age: 71
End: 2021-09-03

## 2021-09-03 NOTE — TELEPHONE ENCOUNTER
Pt said received a message to call office he does not know why or who called him.  He said possibly Dr Louise Go.  [phone]

## 2021-09-09 ENCOUNTER — TREATMENT (OUTPATIENT)
Dept: PHYSICAL THERAPY | Age: 71
End: 2021-09-09
Payer: MEDICARE

## 2021-09-09 DIAGNOSIS — M54.16 LUMBAR RADICULOPATHY: ICD-10-CM

## 2021-09-09 DIAGNOSIS — M47.816 LUMBAR SPONDYLOSIS: Primary | ICD-10-CM

## 2021-09-09 DIAGNOSIS — M48.061 SPINAL STENOSIS OF LUMBAR REGION, UNSPECIFIED WHETHER NEUROGENIC CLAUDICATION PRESENT: ICD-10-CM

## 2021-09-09 PROCEDURE — 97110 THERAPEUTIC EXERCISES: CPT

## 2021-09-09 NOTE — PROGRESS NOTES
[facility]          Phone: [phone] Fax: [phone]    Physical Therapy Daily Treatment Note  Date:  2021    Patient Name:  Vasquez Payne    :  1950  MRN: <[id number]>    Restrictions/Precautions:    Diagnosis:     Diagnosis Orders   1. Lumbar spondylosis     2. Lumbar radiculopathy     3. Spinal stenosis of lumbar region, unspecified whether neurogenic claudication present       Treatment Diagnosis:    Insurance/Certification information:  [facility] Medicare  Referring Physician:  Iam Morillo MD  Plan of care signed (Y/N):    Visit# / total visits:    Pain level: 8/10   Time In:  830  Time Out:  904    Subjective:  Pt reported he has an increase in LBP today ,  continued tingling in B feet, and L lateral calf soreness. Pt reported compliance w/ HEP 1-2 x day. Exercises:  Exercise/Equipment Resistance/Repetitions Other comments     /stepper X 10 mins       Trunk stretch with ball 20 sec x 3 reps       Hamstring stretch w/ PFB 20 sec x 3 reps B  HEP     Lower trunk rotation 15sec x 3 reps B   HEP     Pelvic tilts 5 sec x 10 reps   HEP     Scapular retraction/rows 5 sec x 10 reps       SLR  X 10 reps B                Devora ext. Standing  2 x 10 reps   HEP                                                                      Other : pt performed above ex w/ good pacing. Pt reported the tingling in his feet had lessoned, and pain reduced to 5/10 . Changed prone press up to Devora ext standing d/t prone press up difficult for pt to perform at home. Pt demonstrated good form while performing Devora ext. Home Exercise Program:  : pt was provided w/ handout and reviewed w/ therapist. Pt demonstrated understanding of ex to therapist.  :  Devora standing ext , handout provided , pt demonstrated good form of ex to therapist.     Manual Treatments:  N/A    Modalities:  N/A     Time-in Time-out Total Time   44812  Evaluation Low Complexity      42800  Evaluation Med Complexity      51286  Evaluation High Complexity      86444  Ther Ex 830 904 34   97428  Neuro Re-ed        64465  Ther Activities        10016  Manual Therapy       17393  E-stim       44468  Ultrasound            Session 336 735 50       Treatment/Activity Tolerance:  [x] Patient tolerated treatment well [] Patient limited by fatigue  [] Patient limited by pain  [] Patient limited by other medical complications  [] Other:     Prognosis: [x] Good [] Fair  [] Poor    Patient Requires Follow-up: [x] Yes  [] No    Plan:   [x] Continue per plan of care [] Alter current plan (see comments)  [] Plan of care initiated [] Hold pending MD visit [] Discharge  Plan for Next Session:        Electronically signed by:  Dafne Regan, PTA [id number]

## 2021-09-10 NOTE — TELEPHONE ENCOUNTER
SPOKE WITH KOKO. HE IS SCHEDULED FOR AN APPT. IN 12/21. NO NOTE REGARDING ANY MESSAGE LEFT.   REINALDO

## 2021-09-14 ENCOUNTER — TELEPHONE (OUTPATIENT)
Dept: FAMILY MEDICINE CLINIC | Age: 71
End: 2021-09-14

## 2021-09-14 ENCOUNTER — TREATMENT (OUTPATIENT)
Dept: PHYSICAL THERAPY | Age: 71
End: 2021-09-14
Payer: MEDICARE

## 2021-09-14 DIAGNOSIS — M47.816 LUMBAR SPONDYLOSIS: Primary | ICD-10-CM

## 2021-09-14 DIAGNOSIS — M48.061 SPINAL STENOSIS OF LUMBAR REGION, UNSPECIFIED WHETHER NEUROGENIC CLAUDICATION PRESENT: ICD-10-CM

## 2021-09-14 DIAGNOSIS — M54.16 LUMBAR RADICULOPATHY: ICD-10-CM

## 2021-09-14 PROCEDURE — 97110 THERAPEUTIC EXERCISES: CPT

## 2021-09-14 RX ORDER — NITROGLYCERIN 0.4 MG/1
0.4 TABLET SUBLINGUAL EVERY 5 MIN PRN
Qty: 25 TABLET | Refills: 1 | Status: SHIPPED | OUTPATIENT
Start: 2021-09-14

## 2021-09-14 RX ORDER — ATORVASTATIN CALCIUM 40 MG/1
40 TABLET, FILM COATED ORAL DAILY
Qty: 90 TABLET | Refills: 1 | Status: SHIPPED
Start: 2021-09-14 | End: 2022-03-18 | Stop reason: SDUPTHER

## 2021-09-14 NOTE — TELEPHONE ENCOUNTER
Pt needs refills of the following atorvastatin (LIPITOR) 40 MG tablet       nitroGLYCERIN (NITROSTAT) 0.4 MG SL tablet      please send to [facility] in [place]

## 2021-09-14 NOTE — PROGRESS NOTES
[facility]          Phone: [phone] Fax: [phone]    Physical Therapy Daily Treatment Note  Date:  2021    Patient Name:  Monica Burroughs    :  1950  MRN: <[id number]>    Restrictions/Precautions:    Diagnosis:     Diagnosis Orders   1. Lumbar spondylosis     2. Lumbar radiculopathy     3. Spinal stenosis of lumbar region, unspecified whether neurogenic claudication present       Treatment Diagnosis:    Insurance/Certification information:  [employer] Medicare  Referring Physician:  Tiffani Oneill MD  Plan of care signed (Y/N):    Visit# / total visits:    Pain level: 5/10   Time In:  840  Time Out:  920    Subjective:  Pt reported he has an decrease in LBP today ,  continued tingling in B feet, and L lateral calf soreness. Pt reported compliance w/ HEP 1-2 x day. Pt also stated he returned to prone prop up vs Devora ext exercise siting he did not tolerate standing ext at home    Exercises:  Exercise/Equipment Resistance/Repetitions Other comments     /stepper X 10 mins       Trunk stretch with ball 20 sec x 3 reps       Hamstring stretch w/ PFB 20 sec x 3 reps B  HEP     Lower trunk rotation 15sec x 3 reps B   HEP     Pelvic tilts 5 sec x 10 reps   HEP     Prone prop  5 mins   HEP     Scapular retraction/rows 5 sec x 10 reps       SLR  X 10 reps B                                                                              Other : pt performed above ex w/ good pacing. Pt reported the tingling in his feet had lessoned, and pain reduced to 3/10 . Home Exercise Program:  : pt was provided w/ handout and reviewed w/ therapist. Pt demonstrated understanding of ex to therapist.  :  Devora standing ext , handout provided , pt demonstrated good form of ex to therapist.     Manual Treatments:  N/A    Modalities:  N/A     Time-in Time-out Total Time   14648  Evaluation Low Complexity      36378  Evaluation Med Complexity      57403 Evaluation High Complexity      09342  Ther Ex 840 920 40   85437  Neuro Re-ed        18165  Ther Activities        57130  Manual Therapy       28171  E-stim       13458  Ultrasound            Session 633 709 04       Treatment/Activity Tolerance:  [x] Patient tolerated treatment well [] Patient limited by fatigue  [] Patient limited by pain  [] Patient limited by other medical complications  [] Other:     Prognosis: [x] Good [] Fair  [] Poor    Patient Requires Follow-up: [x] Yes  [] No    Plan:   [x] Continue per plan of care [] Alter current plan (see comments)  [] Plan of care initiated [] Hold pending MD visit [] Discharge  Plan for Next Session:        Electronically signed by:  Valeriy Sage, PTA [id number]

## 2021-09-16 ENCOUNTER — TREATMENT (OUTPATIENT)
Dept: PHYSICAL THERAPY | Age: 71
End: 2021-09-16
Payer: MEDICARE

## 2021-09-16 DIAGNOSIS — M47.816 LUMBAR SPONDYLOSIS: Primary | ICD-10-CM

## 2021-09-16 DIAGNOSIS — M54.16 LUMBAR RADICULOPATHY: ICD-10-CM

## 2021-09-16 DIAGNOSIS — M48.061 SPINAL STENOSIS OF LUMBAR REGION, UNSPECIFIED WHETHER NEUROGENIC CLAUDICATION PRESENT: ICD-10-CM

## 2021-09-16 PROCEDURE — 97110 THERAPEUTIC EXERCISES: CPT

## 2021-09-16 NOTE — PROGRESS NOTES
[facility]          Phone: [phone] Fax: [phone]    Physical Therapy Daily Treatment Note  Date:  2021    Patient Name:  Candace Alston    :  1950  MRN: <[id number]>    Restrictions/Precautions:    Diagnosis:     Diagnosis Orders   1. Lumbar spondylosis     2. Lumbar radiculopathy     3. Spinal stenosis of lumbar region, unspecified whether neurogenic claudication present       Treatment Diagnosis:    Insurance/Certification information:  [employer] Medicare  Referring Physician:  Leticia Muniz MD  Plan of care signed (Y/N):    Visit# / total visits:    Pain level: -2/10   Time In:  843  Time Out:      Subjective:  Pt reported he has a decrease in LBP today , he reported no pain in L lateral calf, and the tingling in his toes is barely there. Pt reported he feels 95 % better today then when he walked in here at the beginning of therapy. Pt reported compliance w/ HEP 1-2 x day. Pt also stated he returned to prone prop up vs Devora ext exercise siting he did not tolerate standing ext at home    Exercises:  Exercise/Equipment Resistance/Repetitions Other comments     /stepper X 10 mins       Trunk stretch with ball 20 sec x 3 reps       Hamstring stretch w/ PFB 20 sec x 3 reps B  HEP     Lower trunk rotation 15sec x 3 reps B   HEP     Pelvic tilts 5 sec x 10 reps   HEP     Prone prop  5 mins   HEP     Scapular retraction/rows GTB 2 x 10 reps       SLR  X 10 reps B                                                                              Other : pt performed above ex w/ good pacing. No exacerbation of pain after session. Home Exercise Program:  : pt was provided w/ handout and reviewed w/ therapist. Pt demonstrated understanding of ex to therapist.  :  Devora standing ext , handout provided , pt demonstrated good form of ex to therapist.     Manual Treatments:  N/A    Modalities:  N/A     Time-in Time-out Total Time   48807 Evaluation Low Complexity      26851  Evaluation Med Complexity      49539  Evaluation High Complexity      89387  Ther Ex 843 921 38   56160  Neuro Re-ed        09698  Ther Activities        60340  Manual Therapy       76163  E-stim       92073  Ultrasound            Session 265 113 41       Treatment/Activity Tolerance:  [x] Patient tolerated treatment well [] Patient limited by fatigue  [] Patient limited by pain  [] Patient limited by other medical complications  [] Other:     Prognosis: [x] Good [] Fair  [] Poor    Patient Requires Follow-up: [x] Yes  [] No    Plan:   [x] Continue per plan of care [] Alter current plan (see comments)  [] Plan of care initiated [] Hold pending MD visit [] Discharge  Plan for Next Session:        Electronically signed by:  Luis Enrique Paz, PTA [id number]

## 2021-09-21 ENCOUNTER — TREATMENT (OUTPATIENT)
Dept: PHYSICAL THERAPY | Age: 71
End: 2021-09-21
Payer: MEDICARE

## 2021-09-21 DIAGNOSIS — M48.061 SPINAL STENOSIS OF LUMBAR REGION, UNSPECIFIED WHETHER NEUROGENIC CLAUDICATION PRESENT: ICD-10-CM

## 2021-09-21 DIAGNOSIS — M54.16 LUMBAR RADICULOPATHY: ICD-10-CM

## 2021-09-21 DIAGNOSIS — M47.816 LUMBAR SPONDYLOSIS: Primary | ICD-10-CM

## 2021-09-21 PROCEDURE — 97110 THERAPEUTIC EXERCISES: CPT

## 2021-09-21 NOTE — PROGRESS NOTES
[facility]          Phone: [phone] Fax: [phone]    Physical Therapy Daily Treatment Note  Date:  2021    Patient Name:  Jesus Gonzalez    :  1950  MRN: <[id number]>    Restrictions/Precautions:    Diagnosis:     Diagnosis Orders   1. Lumbar spondylosis     2. Lumbar radiculopathy     3. Spinal stenosis of lumbar region, unspecified whether neurogenic claudication present       Treatment Diagnosis:    Insurance/Certification information:  t Medicare  Referring Physician:  Louisa Cabrera MD  Plan of care signed (Y/N):    Visit# / total visits:    Pain level: 0-2/10   Time In:  830  Time Out:  917    Subjective:  Pt reported he has a decrease in LBP today , he reported no pain in L lateral calf, and the tingling in his toes is barely there. Pt reported he feels 95 % better  then when he walked in here at the beginning of therapy. Pt reported compliance w/ HEP 1-2 x day. Exercises:  Exercise/Equipment Resistance/Repetitions Other comments     /stepper X 10 mins       Trunk stretch with ball 20 sec x 3 reps       Hamstring stretch w/ PFB 20 sec x 3 reps B  HEP     Lower trunk rotation 15sec x 3 reps B   HEP     Pelvic tilts 5 sec x 10 reps   HEP     Prone prop  5 mins   HEP     Scapular retraction/rows GTB 2 x 10 reps   HEP     SLR  X 10 reps B   HEP                    MMT   B LE 5/5 throughout                  Trunk 4/5              Trunk flex ~ 90 °              Oswestry     2/50 or 4%                     Other : pt performed above ex w/ good pacing. No exacerbation of pain after session. Pt indep w/ HEP    Home Exercise Program:  : pt was provided w/ handout and reviewed w/ therapist. Pt demonstrated understanding of ex to therapist.  :  Devora pereira ext , handout provided , pt demonstrated good form of ex to therapist.     Manual Treatments:  N/A    Modalities:  N/A     Time-in Time-out Total Time   77250 Evaluation Low Complexity      37455  Evaluation Med Complexity      66953  Evaluation High Complexity      95448  Ther Ex 830 917 47   11852  Neuro Re-ed        03458  Ther Activities        08924  Manual Therapy       48090  E-stim       12821  Ultrasound            Session 512 726 95       Treatment/Activity Tolerance:  [x] Patient tolerated treatment well [] Patient limited by fatigue  [] Patient limited by pain  [] Patient limited by other medical complications  [] Other:     Prognosis: [x] Good [] Fair  [] Poor    Patient Requires Follow-up: [x] Yes  [] No    Plan:   [] Continue per plan of care [] Alter current plan (see comments)  [] Plan of care initiated [] Hold pending MD visit [x] Discharge  Plan for Next Session:        Electronically signed by:  Jaime Pratt, PTA [id number]

## 2021-09-23 NOTE — PROGRESS NOTES
[facility]                Phone: [phone] Fax: [phone]    Physical Therapy  Outpatient Discharge Summary     Date:  2021    Patient Name:  Deo Granados    :  1950  MRN: <[id number]>    DIAGNOSIS:     Diagnosis Orders   1. Lumbar spondylosis     2. Lumbar radiculopathy     3. Spinal stenosis of lumbar region, unspecified whether neurogenic claudication present       REFERRING PHYSICIAN:  Kisha Hannah MD    ATTENDANCE:  Pt has attended 8 of 8 scheduled treatments from 21 to 21. TREATMENTS RECEIVED:  Stretching, strength training, core stabilization, Devora extension     INITIAL STATUS:  · Pain in B LEs from the knees to the feet with tingling in the toes, 5-7/10  · ROM lumbar rotation and B SB grossly 50%  · Strength in trunk and B LEs 4-/5  · Decreased tolerance to walking due to onset of LE pain and numbness  · Oswestry: 22%    FINAL STATUS:  · Pain 0-2/10  · ROM lumbar spine WFL  · Strength in B LEs 5/5, trunk 4/5  · Pt able to tolerate walking without increased pain or numbness in the LEs  · Oswestry 4%  · Pt is independent with HEP    GOALS:  6 out of 6 Long Term Goals were obtained. LONG TERM GOALS NOT OBTAINED/REASON:  N/A    PATIENT GOALS:  To decrease pain and numbness    REASON FOR DISCHARGE:  Pt has met goals    PATIENT EDUCATION/INSTRUCTIONS:  Pt provided with a HEP of lower body stretches, core stability, and Devora extension exercises. RECOMMENDATIONS:  Discharge to HEP        Thank you for the opportunity to work with your patient. If you have questions or comments, please feel free to contact me by phone or fax.       Electronically Signed by: Jamari Carter, [id number]  2021

## 2021-10-19 ENCOUNTER — PROCEDURE VISIT (OUTPATIENT)
Dept: AUDIOLOGY | Age: 71
End: 2021-10-19
Payer: MEDICARE

## 2021-10-19 ENCOUNTER — OFFICE VISIT (OUTPATIENT)
Dept: ENT CLINIC | Age: 71
End: 2021-10-19
Payer: MEDICARE

## 2021-10-19 VITALS
TEMPERATURE: 97.3 F | SYSTOLIC BLOOD PRESSURE: 149 MMHG | HEIGHT: 68 IN | OXYGEN SATURATION: 99 % | BODY MASS INDEX: 33.34 KG/M2 | HEART RATE: 61 BPM | WEIGHT: 220 LBS | DIASTOLIC BLOOD PRESSURE: 86 MMHG

## 2021-10-19 DIAGNOSIS — H90.3 SENSORINEURAL HEARING LOSS (SNHL) OF BOTH EARS: Primary | ICD-10-CM

## 2021-10-19 DIAGNOSIS — H90.3 SENSORINEURAL HEARING LOSS, BILATERAL: Primary | ICD-10-CM

## 2021-10-19 PROCEDURE — 92567 TYMPANOMETRY: CPT | Performed by: AUDIOLOGIST

## 2021-10-19 PROCEDURE — 92557 COMPREHENSIVE HEARING TEST: CPT | Performed by: AUDIOLOGIST

## 2021-10-19 PROCEDURE — 99213 OFFICE O/P EST LOW 20 MIN: CPT | Performed by: OTOLARYNGOLOGY

## 2021-10-19 ASSESSMENT — ENCOUNTER SYMPTOMS
EYES NEGATIVE: 1
RESPIRATORY NEGATIVE: 1
VOMITING: 0
ABDOMINAL PAIN: 0
CHEST TIGHTNESS: 0
GASTROINTESTINAL NEGATIVE: 1
DIARRHEA: 0
APNEA: 0
EYE DISCHARGE: 0
SHORTNESS OF BREATH: 0
COLOR CHANGE: 0
EYE PAIN: 0

## 2021-10-19 NOTE — PROGRESS NOTES
Subjective:      Patient ID:  Hans Oh is a 70 y.o. male. HPI:    Patient presents today for recheck. Condition has been present for several years. He has problems hearing in every environment, especially with background noise. 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 Cassi Ware 11/16/2020    EXCISION RIGHT NASAL LESION WITH RECONSTRUCTION WITH FROZEN SECTION performed by Hui Nation DO at [facility] OR     Family History   Problem Relation Age of Onset    High Blood Pressure Mother     Heart Disease Mother     Dementia Mother     Diabetes Mother     Cancer Father 62        lung, tongue, brain    Heart Attack Sister     Heart Attack Brother      Social History     Socioeconomic History    Marital status:      Spouse name: None    Number of children: None    Years of education: None    Highest education level: None   Occupational History    None   Tobacco Use    Smoking status: Never Smoker    Smokeless tobacco: Never Used   Vaping Use    Vaping Use: Never used   Substance and Sexual Activity    Alcohol use: No     Comment: no alcohol x 50 years    Drug use: No    Sexual activity: Not Currently   Other Topics Concern    None   Social History Narrative    Drinks 2 cups of coffee daily. Social Determinants of Health     Financial Resource Strain:     Difficulty of Paying Living Expenses:    Food Insecurity:     Worried About Running Out of Food in the Last Year:     [address] in the Last Year:    Transportation Needs:     Lack of Transportation (Medical):      Lack of Transportation (Non-Medical):    Physical Activity:     Days of Exercise per Week:     Minutes of Exercise per Session:    Stress:     Feeling of Stress :    Social Connections:     Frequency of Communication with Friends and Family:     Frequency of Social Gatherings with Friends and Family:     Attends Shinto Services:     Active Member of Clubs or Organizations:     Attends Club or Organization Meetings:     Marital Status:    Intimate Partner Violence:     Fear of Current or Ex-Partner:     Emotionally Abused:     Physically Abused:     Sexually Abused:      No Known Allergies    Review of Systems   Constitutional: Negative. Negative for appetite change. HENT: Positive for hearing loss. Eyes: Negative. Negative for pain, discharge and visual disturbance. Respiratory: Negative. Negative for apnea, chest tightness and shortness of breath. Cardiovascular: Negative. Negative for chest pain, palpitations and leg swelling. Gastrointestinal: Negative. Negative for abdominal pain, diarrhea and vomiting. Endocrine: Negative for cold intolerance, heat intolerance and polydipsia. Genitourinary: Negative. Negative for dysuria, flank pain and hematuria. Musculoskeletal: Negative. Negative for arthralgias, gait problem and neck pain. Skin: Negative. Negative for color change, pallor and rash. Allergic/Immunologic: Negative for environmental allergies, food allergies and immunocompromised state. Neurological: Negative. Negative for dizziness, numbness and headaches. Hematological: Negative for adenopathy. Psychiatric/Behavioral: Negative. Negative for behavioral problems and hallucinations. All other systems reviewed and are negative. Objective:     Vitals:    10/19/21 0926   BP: (!) 149/86   Pulse: 61   Temp: 97.3 °F (36.3 °C)   SpO2: 99%     Physical Exam  Vitals and nursing note reviewed. Constitutional:       Appearance: He is well-developed.    HENT:      Head: Normocephalic and atraumatic. Right Ear: Tympanic membrane, ear canal and external ear normal. Decreased hearing noted. There is impacted cerumen. Left Ear: Tympanic membrane, ear canal and external ear normal. Decreased hearing noted. Nose: Nose normal. No signs of injury or laceration. Left Nostril: No septal hematoma. Right Turbinates: Not enlarged. Right Sinus: No maxillary sinus tenderness. Left Sinus: No maxillary sinus tenderness. Comments: Incision healing well. Mouth/Throat:      Pharynx: Uvula midline. Eyes:      Conjunctiva/sclera: Conjunctivae normal.      Pupils: Pupils are equal, round, and reactive to light. Cardiovascular:      Rate and Rhythm: Normal rate and regular rhythm. Heart sounds: Normal heart sounds. Pulmonary:      Effort: Pulmonary effort is normal.      Breath sounds: Normal breath sounds. Abdominal:      General: Bowel sounds are normal.      Palpations: Abdomen is soft. Musculoskeletal:      Cervical back: Normal range of motion and neck supple. Skin:     General: Skin is warm and dry. Neurological:      Mental Status: He is alert and oriented to person, place, and time. Assessment:       Diagnosis Orders   1. Sensorineural hearing loss (SNHL) of both ears                Plan:      Advised patient to have hearing aids   I would like to recheck the ears in a year. Follow up in 12 month(s)                       Honorio Albright  1950    I have discussed the case, including pertinent history and exam findings with the resident. I have seen and examined the patient and the key elements of the encounter have been performed by me. I agree with the assessment, plan and orders as documented by the  resident              Remainder of medical problems as per  resident note. Patient seen and examined. Agree with above exam, assessment and plan.       Electronically signed by Calixto Romeo DO on 10/20/21 at 5:35 PM EDT

## 2021-10-19 NOTE — PROGRESS NOTES
This patient was referred for audiometric/tympanometric testing by Dr. Gia Altman due to hearing loss. Audiometry revealed a mild-to-severe  sensorineural hearing loss, bilaterally. Reliability was good. Speech reception thresholds were in fair agreement with the pure tone averages, in the right ear and good agreement, in the left ear. Speech discrimination scores were excellent, in the right ear and good, in the left ear. Tympanometry revealed normal middle ear peak pressure and compliance, bilaterally. The results were reviewed with the patient. Spoke to patient about hearing aids. Chose [employer] hearing aids, champagne color, size 2 receivers, rechargeable. Hearing aids will be ordered, patient to be scheduled once they arrive. Recommendations for follow up will be made pending physician consult.     Mei Mcclure AuD

## 2021-11-17 ENCOUNTER — NURSE TRIAGE (OUTPATIENT)
Dept: OTHER | Facility: CLINIC | Age: 71
End: 2021-11-17

## 2021-11-17 ENCOUNTER — HOSPITAL ENCOUNTER (INPATIENT)
Age: 71
LOS: 4 days | Discharge: HOME | DRG: 177 | End: 2021-11-21
Attending: EMERGENCY MEDICINE | Admitting: INTERNAL MEDICINE
Payer: MEDICARE

## 2021-11-17 ENCOUNTER — APPOINTMENT (OUTPATIENT)
Dept: GENERAL RADIOLOGY | Age: 71
DRG: 177 | End: 2021-11-17
Payer: MEDICARE

## 2021-11-17 ENCOUNTER — APPOINTMENT (OUTPATIENT)
Dept: CT IMAGING | Age: 71
DRG: 177 | End: 2021-11-17
Payer: MEDICARE

## 2021-11-17 DIAGNOSIS — J96.01 ACUTE RESPIRATORY FAILURE WITH HYPOXIA (HCC): Primary | ICD-10-CM

## 2021-11-17 PROBLEM — U07.1 PNEUMONIA DUE TO COVID-19 VIRUS: Status: ACTIVE | Noted: 2021-11-17

## 2021-11-17 PROBLEM — J12.82 PNEUMONIA DUE TO COVID-19 VIRUS: Status: ACTIVE | Noted: 2021-11-17

## 2021-11-17 LAB
ALBUMIN SERPL-MCNC: 3.5 G/DL (ref 3.5–5.2)
ALP BLD-CCNC: 84 U/L (ref 40–129)
ALT SERPL-CCNC: 21 U/L (ref 0–40)
ANION GAP SERPL CALCULATED.3IONS-SCNC: 19 MMOL/L (ref 7–16)
AST SERPL-CCNC: 34 U/L (ref 0–39)
BASOPHILS ABSOLUTE: 0.01 E9/L (ref 0–0.2)
BASOPHILS RELATIVE PERCENT: 0.1 % (ref 0–2)
BILIRUB SERPL-MCNC: 0.9 MG/DL (ref 0–1.2)
BUN BLDV-MCNC: 14 MG/DL (ref 6–23)
CALCIUM SERPL-MCNC: 9.5 MG/DL (ref 8.6–10.2)
CHLORIDE BLD-SCNC: 92 MMOL/L (ref 98–107)
CO2: 19 MMOL/L (ref 22–29)
CREAT SERPL-MCNC: 1.1 MG/DL (ref 0.7–1.2)
D DIMER: 660 NG/ML DDU
EKG ATRIAL RATE: 83 BPM
EKG P AXIS: 12 DEGREES
EKG P-R INTERVAL: 168 MS
EKG Q-T INTERVAL: 378 MS
EKG QRS DURATION: 84 MS
EKG QTC CALCULATION (BAZETT): 444 MS
EKG R AXIS: -31 DEGREES
EKG T AXIS: 11 DEGREES
EKG VENTRICULAR RATE: 83 BPM
EOSINOPHILS ABSOLUTE: 0.02 E9/L (ref 0.05–0.5)
EOSINOPHILS RELATIVE PERCENT: 0.3 % (ref 0–6)
GFR AFRICAN AMERICAN: >60
GFR NON-AFRICAN AMERICAN: >60 ML/MIN/1.73
GLUCOSE BLD-MCNC: 127 MG/DL (ref 74–99)
HCT VFR BLD CALC: 38.3 % (ref 37–54)
HEMOGLOBIN: 13.2 G/DL (ref 12.5–16.5)
IMMATURE GRANULOCYTES #: 0.05 E9/L
IMMATURE GRANULOCYTES %: 0.7 % (ref 0–5)
INR BLD: 1.4
LACTIC ACID: 2.7 MMOL/L (ref 0.5–2.2)
LIPASE: 29 U/L (ref 13–60)
LYMPHOCYTES ABSOLUTE: 0.8 E9/L (ref 1.5–4)
LYMPHOCYTES RELATIVE PERCENT: 10.5 % (ref 20–42)
MCH RBC QN AUTO: 31 PG (ref 26–35)
MCHC RBC AUTO-ENTMCNC: 34.5 % (ref 32–34.5)
MCV RBC AUTO: 89.9 FL (ref 80–99.9)
MONOCYTES ABSOLUTE: 0.6 E9/L (ref 0.1–0.95)
MONOCYTES RELATIVE PERCENT: 7.9 % (ref 2–12)
NEUTROPHILS ABSOLUTE: 6.15 E9/L (ref 1.8–7.3)
NEUTROPHILS RELATIVE PERCENT: 80.5 % (ref 43–80)
PDW BLD-RTO: 12.1 FL (ref 11.5–15)
PLATELET # BLD: 302 E9/L (ref 130–450)
PMV BLD AUTO: 9.1 FL (ref 7–12)
POTASSIUM REFLEX MAGNESIUM: 4.2 MMOL/L (ref 3.5–5)
PRO-BNP: 959 PG/ML (ref 0–125)
PROTHROMBIN TIME: 15.4 SEC (ref 9.3–12.4)
RBC # BLD: 4.26 E12/L (ref 3.8–5.8)
SARS-COV-2, NAAT: DETECTED
SODIUM BLD-SCNC: 130 MMOL/L (ref 132–146)
TOTAL PROTEIN: 7.2 G/DL (ref 6.4–8.3)
TROPONIN, HIGH SENSITIVITY: 19 NG/L (ref 0–11)
WBC # BLD: 7.6 E9/L (ref 4.5–11.5)

## 2021-11-17 PROCEDURE — 36415 COLL VENOUS BLD VENIPUNCTURE: CPT

## 2021-11-17 PROCEDURE — 83690 ASSAY OF LIPASE: CPT

## 2021-11-17 PROCEDURE — 99285 EMERGENCY DEPT VISIT HI MDM: CPT

## 2021-11-17 PROCEDURE — 96374 THER/PROPH/DIAG INJ IV PUSH: CPT

## 2021-11-17 PROCEDURE — 85025 COMPLETE CBC W/AUTO DIFF WBC: CPT

## 2021-11-17 PROCEDURE — 85378 FIBRIN DEGRADE SEMIQUANT: CPT

## 2021-11-17 PROCEDURE — 6360000004 HC RX CONTRAST MEDICATION: Performed by: STUDENT IN AN ORGANIZED HEALTH CARE EDUCATION/TRAINING PROGRAM

## 2021-11-17 PROCEDURE — 71275 CT ANGIOGRAPHY CHEST: CPT

## 2021-11-17 PROCEDURE — 84484 ASSAY OF TROPONIN QUANT: CPT

## 2021-11-17 PROCEDURE — 83880 ASSAY OF NATRIURETIC PEPTIDE: CPT

## 2021-11-17 PROCEDURE — 2140000000 HC CCU INTERMEDIATE R&B

## 2021-11-17 PROCEDURE — 71045 X-RAY EXAM CHEST 1 VIEW: CPT

## 2021-11-17 PROCEDURE — 93005 ELECTROCARDIOGRAM TRACING: CPT | Performed by: EMERGENCY MEDICINE

## 2021-11-17 PROCEDURE — 85610 PROTHROMBIN TIME: CPT

## 2021-11-17 PROCEDURE — 80053 COMPREHEN METABOLIC PANEL: CPT

## 2021-11-17 PROCEDURE — 87635 SARS-COV-2 COVID-19 AMP PRB: CPT

## 2021-11-17 PROCEDURE — 93010 ELECTROCARDIOGRAM REPORT: CPT | Performed by: INTERNAL MEDICINE

## 2021-11-17 PROCEDURE — 83605 ASSAY OF LACTIC ACID: CPT

## 2021-11-17 PROCEDURE — 2580000003 HC RX 258: Performed by: EMERGENCY MEDICINE

## 2021-11-17 PROCEDURE — 6360000002 HC RX W HCPCS: Performed by: EMERGENCY MEDICINE

## 2021-11-17 RX ORDER — 0.9 % SODIUM CHLORIDE 0.9 %
500 INTRAVENOUS SOLUTION INTRAVENOUS ONCE
Status: COMPLETED | OUTPATIENT
Start: 2021-11-17 | End: 2021-11-17

## 2021-11-17 RX ORDER — DEXAMETHASONE SODIUM PHOSPHATE 10 MG/ML
10 INJECTION INTRAMUSCULAR; INTRAVENOUS ONCE
Status: COMPLETED | OUTPATIENT
Start: 2021-11-17 | End: 2021-11-17

## 2021-11-17 RX ADMIN — IOPAMIDOL 75 ML: 755 INJECTION, SOLUTION INTRAVENOUS at 12:28

## 2021-11-17 RX ADMIN — DEXAMETHASONE SODIUM PHOSPHATE 10 MG: 10 INJECTION INTRAMUSCULAR; INTRAVENOUS at 11:48

## 2021-11-17 RX ADMIN — SODIUM CHLORIDE 500 ML: 9 INJECTION, SOLUTION INTRAVENOUS at 09:06

## 2021-11-17 ASSESSMENT — ENCOUNTER SYMPTOMS
BACK PAIN: 0
ABDOMINAL PAIN: 0
SHORTNESS OF BREATH: 0
COUGH: 0

## 2021-11-17 NOTE — ED PROVIDER NOTES
This is a 71-year-old male with a past medical history of coronary artery disease who presents to the ED for evaluation of fatigue. He states that for about 2 weeks he has been feeling fatigued lightheaded as well as having a decrease in appetite as a cough. He states the cough is dry and has no productive sputum. Patient also states he feels though he does not want to eat at all as he has no appetite. No reported fevers chills or ill contacts. Patient states he did get both of his Covid vaccinations. No dysuria or hematuria. He states he does not smoke. No other reported mitigating or exacerbating factors. The history is provided by the patient. No  was used. Review of Systems   Constitutional: Negative for fever. HENT: Positive for congestion. Eyes: Negative for visual disturbance. Respiratory: Negative for cough and shortness of breath. Cardiovascular: Negative for chest pain. Gastrointestinal: Negative for abdominal pain. Endocrine: Negative for polyuria. Genitourinary: Negative for dysuria. Musculoskeletal: Negative for back pain. Skin: Negative for rash. Neurological: Negative for headaches. Hematological: Does not bruise/bleed easily. Psychiatric/Behavioral: Negative for confusion. Physical Exam  Vitals and nursing note reviewed. Constitutional:       General: He is not in acute distress. Appearance: He is well-developed. HENT:      Head: Normocephalic and atraumatic. Eyes:      Extraocular Movements: Extraocular movements intact. Pupils: Pupils are equal, round, and reactive to light. Neck:      Vascular: No JVD. Cardiovascular:      Rate and Rhythm: Normal rate and regular rhythm. Pulmonary:      Effort: Pulmonary effort is normal.      Breath sounds: No wheezing or rales. Abdominal:      General: There is no distension. Palpations: Abdomen is soft. Tenderness: There is no abdominal tenderness.  There is mother; Heart Attack in his brother and sister; Heart Disease in his mother; High Blood Pressure in his mother. The patients home medications have been reviewed. Allergies: Patient has no known allergies. -------------------------------------------------- RESULTS -------------------------------------------------    LABS:  Results for orders placed or performed during the hospital encounter of 11/17/21   COVID-19, Rapid    Specimen: Nasopharyngeal Swab   Result Value Ref Range    SARS-CoV-2, NAAT DETECTED (A) Not Detected   LEGIONELLA ANTIGEN, URINE    Specimen: Urine, clean catch   Result Value Ref Range    L. pneumophila Serogp 1 Ur Ag       Presumptive Negative -suggesting no recent or current infections  with Legionella pneumophila serogroup 1. Infection to Legionella cannot be ruled out since other serogroups  and species may cause infection, antigen may not be present in  early infection, or level of antigen may be below the  detection limit. Normal Range: Presumptive Negative     STREP PNEUMONIAE ANTIGEN    Specimen: Urine, clean catch   Result Value Ref Range    STREP PNEUMONIAE ANTIGEN, URINE       Presumptive negative- suggests no current or recent  pneumococcal infection.   Infection due to Strep pneumoniae cannot be  ruled out since the antigen present in the sample  may be below the detection limit of the test.  Normal Range:Presumptive Negative     Comprehensive Metabolic Panel w/ Reflex to MG   Result Value Ref Range    Sodium 130 (L) 132 - 146 mmol/L    Potassium reflex Magnesium 4.2 3.5 - 5.0 mmol/L    Chloride 92 (L) 98 - 107 mmol/L    CO2 19 (L) 22 - 29 mmol/L    Anion Gap 19 (H) 7 - 16 mmol/L    Glucose 127 (H) 74 - 99 mg/dL    BUN 14 6 - 23 mg/dL    CREATININE 1.1 0.7 - 1.2 mg/dL    GFR Non-African American >60 >=60 mL/min/1.73    GFR African American >60     Calcium 9.5 8.6 - 10.2 mg/dL    Total Protein 7.2 6.4 - 8.3 g/dL    Albumin 3.5 3.5 - 5.2 g/dL    Total Bilirubin 0.9 0.0 - 1.2 mg/dL    Alkaline Phosphatase 84 40 - 129 U/L    ALT 21 0 - 40 U/L    AST 34 0 - 39 U/L   CBC Auto Differential   Result Value Ref Range    WBC 7.6 4.5 - 11.5 E9/L    RBC 4.26 3.80 - 5.80 E12/L    Hemoglobin 13.2 12.5 - 16.5 g/dL    Hematocrit 38.3 37.0 - 54.0 %    MCV 89.9 80.0 - 99.9 fL    MCH 31.0 26.0 - 35.0 pg    MCHC 34.5 32.0 - 34.5 %    RDW 12.1 11.5 - 15.0 fL    Platelets 445 259 - 142 E9/L    MPV 9.1 7.0 - 12.0 fL    Neutrophils % 80.5 (H) 43.0 - 80.0 %    Immature Granulocytes % 0.7 0.0 - 5.0 %    Lymphocytes % 10.5 (L) 20.0 - 42.0 %    Monocytes % 7.9 2.0 - 12.0 %    Eosinophils % 0.3 0.0 - 6.0 %    Basophils % 0.1 0.0 - 2.0 %    Neutrophils Absolute 6.15 1.80 - 7.30 E9/L    Immature Granulocytes # 0.05 E9/L    Lymphocytes Absolute 0.80 (L) 1.50 - 4.00 E9/L    Monocytes Absolute 0.60 0.10 - 0.95 E9/L    Eosinophils Absolute 0.02 (L) 0.05 - 0.50 E9/L    Basophils Absolute 0.01 0.00 - 0.20 E9/L   Troponin   Result Value Ref Range    Troponin, High Sensitivity 19 (H) 0 - 11 ng/L   Brain Natriuretic Peptide   Result Value Ref Range    Pro- (H) 0 - 125 pg/mL   Lactic Acid, Plasma   Result Value Ref Range    Lactic Acid 2.7 (H) 0.5 - 2.2 mmol/L   Lipase   Result Value Ref Range    Lipase 29 13 - 60 U/L   Protime-INR   Result Value Ref Range    Protime 15.4 (H) 9.3 - 12.4 sec    INR 1.4    D-Dimer, Quantitative   Result Value Ref Range    D-Dimer, Quant 660 ng/mL DDU   CBC WITH AUTO DIFFERENTIAL   Result Value Ref Range    WBC 8.0 4.5 - 11.5 E9/L    RBC 4.27 3.80 - 5.80 E12/L    Hemoglobin 13.4 12.5 - 16.5 g/dL    Hematocrit 39.9 37.0 - 54.0 %    MCV 93.4 80.0 - 99.9 fL    MCH 31.4 26.0 - 35.0 pg    MCHC 33.6 32.0 - 34.5 %    RDW 12.2 11.5 - 15.0 fL    Platelets 751 006 - 710 E9/L    MPV 9.1 7.0 - 12.0 fL    Neutrophils % 84.7 (H) 43.0 - 80.0 %    Immature Granulocytes % 0.9 0.0 - 5.0 %    Lymphocytes % 9.8 (L) 20.0 - 42.0 %    Monocytes % 4.5 2.0 - 12.0 %    Eosinophils % 0.0 0.0 - 6.0 %    Basophils % 0.1 0.0 - 2.0 %    Neutrophils Absolute 6.81 1.80 - 7.30 E9/L    Immature Granulocytes # 0.07 E9/L    Lymphocytes Absolute 0.79 (L) 1.50 - 4.00 E9/L    Monocytes Absolute 0.36 0.10 - 0.95 E9/L    Eosinophils Absolute 0.00 (L) 0.05 - 0.50 E9/L    Basophils Absolute 0.01 0.00 - 0.20 E9/L   Comprehensive metabolic panel   Result Value Ref Range    Sodium 131 (L) 132 - 146 mmol/L    Potassium 3.8 3.5 - 5.0 mmol/L    Chloride 94 (L) 98 - 107 mmol/L    CO2 18 (L) 22 - 29 mmol/L    Anion Gap 19 (H) 7 - 16 mmol/L    Glucose 176 (H) 74 - 99 mg/dL    BUN 18 6 - 23 mg/dL    CREATININE 1.2 0.7 - 1.2 mg/dL    GFR Non-African American 60 >=60 mL/min/1.73    GFR African American >60     Calcium 9.7 8.6 - 10.2 mg/dL    Total Protein 7.8 6.4 - 8.3 g/dL    Albumin 3.8 3.5 - 5.2 g/dL    Total Bilirubin 0.8 0.0 - 1.2 mg/dL    Alkaline Phosphatase 94 40 - 129 U/L    ALT 31 0 - 40 U/L    AST 36 0 - 39 U/L   Magnesium   Result Value Ref Range    Magnesium 2.2 1.6 - 2.6 mg/dL   Lactic acid, plasma   Result Value Ref Range    Lactic Acid 4.9 (HH) 0.5 - 2.2 mmol/L   Troponin   Result Value Ref Range    Troponin, High Sensitivity 15 (H) 0 - 11 ng/L   CBC Auto Differential   Result Value Ref Range    WBC 10.4 4.5 - 11.5 E9/L    RBC 3.97 3.80 - 5.80 E12/L    Hemoglobin 12.1 (L) 12.5 - 16.5 g/dL    Hematocrit 35.9 (L) 37.0 - 54.0 %    MCV 90.4 80.0 - 99.9 fL    MCH 30.5 26.0 - 35.0 pg    MCHC 33.7 32.0 - 34.5 %    RDW 12.2 11.5 - 15.0 fL    Platelets 882 020 - 932 E9/L    MPV 9.2 7.0 - 12.0 fL    Neutrophils % 84.6 (H) 43.0 - 80.0 %    Immature Granulocytes % 0.7 0.0 - 5.0 %    Lymphocytes % 8.3 (L) 20.0 - 42.0 %    Monocytes % 6.3 2.0 - 12.0 %    Eosinophils % 0.0 0.0 - 6.0 %    Basophils % 0.1 0.0 - 2.0 %    Neutrophils Absolute 8.80 (H) 1.80 - 7.30 E9/L    Immature Granulocytes # 0.07 E9/L    Lymphocytes Absolute 0.86 (L) 1.50 - 4.00 E9/L    Monocytes Absolute 0.66 0.10 - 0.95 E9/L    Eosinophils Absolute 0.00 (L) 0.05 - 0.50 E9/L Basophils Absolute 0.01 0.00 - 0.20 E9/L   Comprehensive Metabolic Panel w/ Reflex to MG   Result Value Ref Range    Sodium 138 132 - 146 mmol/L    Potassium reflex Magnesium 4.3 3.5 - 5.0 mmol/L    Chloride 104 98 - 107 mmol/L    CO2 20 (L) 22 - 29 mmol/L    Anion Gap 14 7 - 16 mmol/L    Glucose 137 (H) 74 - 99 mg/dL    BUN 17 6 - 23 mg/dL    CREATININE 1.0 0.7 - 1.2 mg/dL    GFR Non-African American >60 >=60 mL/min/1.73    GFR African American >60     Calcium 9.2 8.6 - 10.2 mg/dL    Total Protein 6.4 6.4 - 8.3 g/dL    Albumin 2.9 (L) 3.5 - 5.2 g/dL    Total Bilirubin 0.5 0.0 - 1.2 mg/dL    Alkaline Phosphatase 78 40 - 129 U/L    ALT 24 0 - 40 U/L    AST 24 0 - 39 U/L   Procalcitonin   Result Value Ref Range    Procalcitonin 0.09 (H) 0.00 - 0.08 ng/mL   C-Reactive Protein   Result Value Ref Range    CRP 12.4 (H) 0.0 - 0.4 mg/dL   C-Reactive Protein   Result Value Ref Range    CRP 8.1 (H) 0.0 - 0.4 mg/dL   Lactate Dehydrogenase   Result Value Ref Range     (H) 135 - 225 U/L   Ferritin   Result Value Ref Range    Ferritin 849 ng/mL   D-Dimer, Quantitative   Result Value Ref Range    D-Dimer, Quant >5250 ng/mL DDU   C-REACTIVE PROTEIN   Result Value Ref Range    CRP 14.3 (H) 0.0 - 0.4 mg/dL   LACTATE DEHYDROGENASE   Result Value Ref Range     (H) 135 - 225 U/L   D-dimer, quantitative   Result Value Ref Range    D-Dimer, Quant 633 ng/mL DDU   Fibrinogen   Result Value Ref Range    Fibrinogen >700 (H) 225 - 540 mg/dL   Ferritin   Result Value Ref Range    Ferritin 854 ng/mL   CBC Auto Differential   Result Value Ref Range    WBC 13.2 (H) 4.5 - 11.5 E9/L    RBC 3.77 (L) 3.80 - 5.80 E12/L    Hemoglobin 11.5 (L) 12.5 - 16.5 g/dL    Hematocrit 34.6 (L) 37.0 - 54.0 %    MCV 91.8 80.0 - 99.9 fL    MCH 30.5 26.0 - 35.0 pg    MCHC 33.2 32.0 - 34.5 %    RDW 12.4 11.5 - 15.0 fL    Platelets 325 202 - 004 E9/L    MPV 9.2 7.0 - 12.0 fL    Neutrophils % 88.9 (H) 43.0 - 80.0 %    Immature Granulocytes % 0.8 0.0 - 5.0 % Lymphocytes % 6.7 (L) 20.0 - 42.0 %    Monocytes % 3.4 2.0 - 12.0 %    Eosinophils % 0.0 0.0 - 6.0 %    Basophils % 0.2 0.0 - 2.0 %    Neutrophils Absolute 11.73 (H) 1.80 - 7.30 E9/L    Immature Granulocytes # 0.10 E9/L    Lymphocytes Absolute 0.89 (L) 1.50 - 4.00 E9/L    Monocytes Absolute 0.45 0.10 - 0.95 E9/L    Eosinophils Absolute 0.00 (L) 0.05 - 0.50 E9/L    Basophils Absolute 0.02 0.00 - 0.20 E9/L   Comprehensive Metabolic Panel w/ Reflex to MG   Result Value Ref Range    Sodium 137 132 - 146 mmol/L    Potassium reflex Magnesium 4.0 3.5 - 5.0 mmol/L    Chloride 104 98 - 107 mmol/L    CO2 20 (L) 22 - 29 mmol/L    Anion Gap 13 7 - 16 mmol/L    Glucose 139 (H) 74 - 99 mg/dL    BUN 27 (H) 6 - 23 mg/dL    CREATININE 1.1 0.7 - 1.2 mg/dL    GFR Non-African American >60 >=60 mL/min/1.73    GFR African American >60     Calcium 9.2 8.6 - 10.2 mg/dL    Total Protein 6.4 6.4 - 8.3 g/dL    Albumin 3.0 (L) 3.5 - 5.2 g/dL    Total Bilirubin 0.4 0.0 - 1.2 mg/dL    Alkaline Phosphatase 73 40 - 129 U/L    ALT 30 0 - 40 U/L    AST 27 0 - 39 U/L   Fibrinogen   Result Value Ref Range    Fibrinogen >700 (H) 225 - 540 mg/dL   C-Reactive Protein   Result Value Ref Range    CRP 3.9 (H) 0.0 - 0.4 mg/dL   D-Dimer, Quantitative   Result Value Ref Range    D-Dimer, Quant 554 ng/mL DDU   Lactic Acid, Plasma   Result Value Ref Range    Lactic Acid 2.8 (H) 0.5 - 2.2 mmol/L   Procalcitonin   Result Value Ref Range    Procalcitonin 0.08 0.00 - 0.08 ng/mL   EKG 12 Lead   Result Value Ref Range    Ventricular Rate 83 BPM    Atrial Rate 83 BPM    P-R Interval 168 ms    QRS Duration 84 ms    Q-T Interval 378 ms    QTc Calculation (Bazett) 444 ms    P Axis 12 degrees    R Axis -31 degrees    T Axis 11 degrees       RADIOLOGY:  CTA PULMONARY W CONTRAST   Final Result   1. There is no evidence of a pulmonary embolus   2. Multifocal bilateral ground-glass and semi solid pulmonary infiltrates   most consistent with COVID pneumonia.    3. Significant kyphosis of the thoracic spine. There is no compression   fracture. XR CHEST PORTABLE   Final Result   1. Peripheral patchy multifocal bilateral pneumonia.                   ------------------------- NURSING NOTES AND VITALS REVIEWED ---------------------------  Date / Time Roomed:  11/17/2021  8:29 AM  ED Bed Assignment:  [id number]    The nursing notes within the ED encounter and vital signs as below have been reviewed. Patient Vitals for the past 24 hrs:   BP Temp Temp src Pulse Resp SpO2   11/19/21 0845 128/68 97 °F (36.1 °C) Temporal 67 18 97 %   11/19/21 0159 130/70 97 °F (36.1 °C) Oral 70 20 97 %   11/18/21 1944 136/74 97.6 °F (36.4 °C) Oral 79 20 97 %   11/18/21 1756 (!) 145/84 97 °F (36.1 °C) Temporal 79 19 96 %       Oxygen Saturation Interpretation: Abnormal    ------------------------------------------ PROGRESS NOTES ------------------------------------------  Re-evaluation(s):  Time: 122  Patients symptoms are improving  Repeat physical examination is improved    Counseling:  I have spoken with the patient and discussed todays results, in addition to providing specific details for the plan of care and counseling regarding the diagnosis and prognosis. Their questions are answered at this time and they are agreeable with the plan of admission.    --------------------------------- ADDITIONAL PROVIDER NOTES ---------------------------------  Consultations:  Spoke with Medicine Team Discussed case. They will admit the patient. This patient's ED course included: a personal history and physicial examination, re-evaluation prior to disposition, multiple bedside re-evaluations, IV medications, cardiac monitoring, continuous pulse oximetry and complex medical decision making and emergency management    This patient has remained hemodynamically stable during their ED course. Diagnosis:  1.  Acute respiratory failure with hypoxia (HCC)        Disposition:  Patient's disposition: Admit to telemetry  Patient's condition is stable.           Salinas Blocker, DO  11/19/21 0763

## 2021-11-17 NOTE — TELEPHONE ENCOUNTER
BETTER-SAME-WORSE: Julio C Hector you getting better, staying the same or getting worse compared to yesterday? \"  If getting worse, ask, \"In what way? \"      Better    9. HIGH RISK DISEASE: \"Do you have any chronic medical problems? \" (e.g., asthma, heart or lung disease, weak immune system, obesity, etc.)      Stent placement    10. PREGNANCY: \"Is there any chance you are pregnant? \" \"When was your last menstrual period? \"        N/a    11. OTHER SYMPTOMS: \"Do you have any other symptoms? \"  (e.g., chills, fatigue, headache, loss of smell or taste, muscle pain, sore throat; new loss of smell or taste especially support the diagnosis of COVID-19)        See Covid screen    Protocols used: CORONAVIRUS (COVID-19) DIAGNOSED OR SUSPECTED-ADULT-[place]

## 2021-11-18 LAB
ALBUMIN SERPL-MCNC: 2.9 G/DL (ref 3.5–5.2)
ALBUMIN SERPL-MCNC: 3.8 G/DL (ref 3.5–5.2)
ALP BLD-CCNC: 78 U/L (ref 40–129)
ALP BLD-CCNC: 94 U/L (ref 40–129)
ALT SERPL-CCNC: 24 U/L (ref 0–40)
ALT SERPL-CCNC: 31 U/L (ref 0–40)
ANION GAP SERPL CALCULATED.3IONS-SCNC: 14 MMOL/L (ref 7–16)
ANION GAP SERPL CALCULATED.3IONS-SCNC: 19 MMOL/L (ref 7–16)
AST SERPL-CCNC: 24 U/L (ref 0–39)
AST SERPL-CCNC: 36 U/L (ref 0–39)
BASOPHILS ABSOLUTE: 0.01 E9/L (ref 0–0.2)
BASOPHILS ABSOLUTE: 0.01 E9/L (ref 0–0.2)
BASOPHILS RELATIVE PERCENT: 0.1 % (ref 0–2)
BASOPHILS RELATIVE PERCENT: 0.1 % (ref 0–2)
BILIRUB SERPL-MCNC: 0.5 MG/DL (ref 0–1.2)
BILIRUB SERPL-MCNC: 0.8 MG/DL (ref 0–1.2)
BUN BLDV-MCNC: 17 MG/DL (ref 6–23)
BUN BLDV-MCNC: 18 MG/DL (ref 6–23)
C-REACTIVE PROTEIN: 12.4 MG/DL (ref 0–0.4)
C-REACTIVE PROTEIN: 14.3 MG/DL (ref 0–0.4)
C-REACTIVE PROTEIN: 8.1 MG/DL (ref 0–0.4)
CALCIUM SERPL-MCNC: 9.2 MG/DL (ref 8.6–10.2)
CALCIUM SERPL-MCNC: 9.7 MG/DL (ref 8.6–10.2)
CHLORIDE BLD-SCNC: 104 MMOL/L (ref 98–107)
CHLORIDE BLD-SCNC: 94 MMOL/L (ref 98–107)
CO2: 18 MMOL/L (ref 22–29)
CO2: 20 MMOL/L (ref 22–29)
CREAT SERPL-MCNC: 1 MG/DL (ref 0.7–1.2)
CREAT SERPL-MCNC: 1.2 MG/DL (ref 0.7–1.2)
D DIMER: 633 NG/ML DDU
D DIMER: >5250 NG/ML DDU
EOSINOPHILS ABSOLUTE: 0 E9/L (ref 0.05–0.5)
EOSINOPHILS ABSOLUTE: 0 E9/L (ref 0.05–0.5)
EOSINOPHILS RELATIVE PERCENT: 0 % (ref 0–6)
EOSINOPHILS RELATIVE PERCENT: 0 % (ref 0–6)
FERRITIN: 849 NG/ML
FERRITIN: 854 NG/ML
FIBRINOGEN: >700 MG/DL (ref 225–540)
GFR AFRICAN AMERICAN: >60
GFR AFRICAN AMERICAN: >60
GFR NON-AFRICAN AMERICAN: 60 ML/MIN/1.73
GFR NON-AFRICAN AMERICAN: >60 ML/MIN/1.73
GLUCOSE BLD-MCNC: 137 MG/DL (ref 74–99)
GLUCOSE BLD-MCNC: 176 MG/DL (ref 74–99)
HCT VFR BLD CALC: 35.9 % (ref 37–54)
HCT VFR BLD CALC: 39.9 % (ref 37–54)
HEMOGLOBIN: 12.1 G/DL (ref 12.5–16.5)
HEMOGLOBIN: 13.4 G/DL (ref 12.5–16.5)
IMMATURE GRANULOCYTES #: 0.07 E9/L
IMMATURE GRANULOCYTES #: 0.07 E9/L
IMMATURE GRANULOCYTES %: 0.7 % (ref 0–5)
IMMATURE GRANULOCYTES %: 0.9 % (ref 0–5)
LACTATE DEHYDROGENASE: 347 U/L (ref 135–225)
LACTATE DEHYDROGENASE: 387 U/L (ref 135–225)
LACTIC ACID: 4.9 MMOL/L (ref 0.5–2.2)
LYMPHOCYTES ABSOLUTE: 0.79 E9/L (ref 1.5–4)
LYMPHOCYTES ABSOLUTE: 0.86 E9/L (ref 1.5–4)
LYMPHOCYTES RELATIVE PERCENT: 8.3 % (ref 20–42)
LYMPHOCYTES RELATIVE PERCENT: 9.8 % (ref 20–42)
MAGNESIUM: 2.2 MG/DL (ref 1.6–2.6)
MCH RBC QN AUTO: 30.5 PG (ref 26–35)
MCH RBC QN AUTO: 31.4 PG (ref 26–35)
MCHC RBC AUTO-ENTMCNC: 33.6 % (ref 32–34.5)
MCHC RBC AUTO-ENTMCNC: 33.7 % (ref 32–34.5)
MCV RBC AUTO: 90.4 FL (ref 80–99.9)
MCV RBC AUTO: 93.4 FL (ref 80–99.9)
MONOCYTES ABSOLUTE: 0.36 E9/L (ref 0.1–0.95)
MONOCYTES ABSOLUTE: 0.66 E9/L (ref 0.1–0.95)
MONOCYTES RELATIVE PERCENT: 4.5 % (ref 2–12)
MONOCYTES RELATIVE PERCENT: 6.3 % (ref 2–12)
NEUTROPHILS ABSOLUTE: 6.81 E9/L (ref 1.8–7.3)
NEUTROPHILS ABSOLUTE: 8.8 E9/L (ref 1.8–7.3)
NEUTROPHILS RELATIVE PERCENT: 84.6 % (ref 43–80)
NEUTROPHILS RELATIVE PERCENT: 84.7 % (ref 43–80)
PDW BLD-RTO: 12.2 FL (ref 11.5–15)
PDW BLD-RTO: 12.2 FL (ref 11.5–15)
PLATELET # BLD: 338 E9/L (ref 130–450)
PLATELET # BLD: 406 E9/L (ref 130–450)
PMV BLD AUTO: 9.1 FL (ref 7–12)
PMV BLD AUTO: 9.2 FL (ref 7–12)
POTASSIUM REFLEX MAGNESIUM: 4.3 MMOL/L (ref 3.5–5)
POTASSIUM SERPL-SCNC: 3.8 MMOL/L (ref 3.5–5)
PROCALCITONIN: 0.09 NG/ML (ref 0–0.08)
RBC # BLD: 3.97 E12/L (ref 3.8–5.8)
RBC # BLD: 4.27 E12/L (ref 3.8–5.8)
SODIUM BLD-SCNC: 131 MMOL/L (ref 132–146)
SODIUM BLD-SCNC: 138 MMOL/L (ref 132–146)
TOTAL PROTEIN: 6.4 G/DL (ref 6.4–8.3)
TOTAL PROTEIN: 7.8 G/DL (ref 6.4–8.3)
TROPONIN, HIGH SENSITIVITY: 15 NG/L (ref 0–11)
WBC # BLD: 10.4 E9/L (ref 4.5–11.5)
WBC # BLD: 8 E9/L (ref 4.5–11.5)

## 2021-11-18 PROCEDURE — 6360000002 HC RX W HCPCS: Performed by: FAMILY MEDICINE

## 2021-11-18 PROCEDURE — 86140 C-REACTIVE PROTEIN: CPT

## 2021-11-18 PROCEDURE — 2580000003 HC RX 258: Performed by: FAMILY MEDICINE

## 2021-11-18 PROCEDURE — 85025 COMPLETE CBC W/AUTO DIFF WBC: CPT

## 2021-11-18 PROCEDURE — 80053 COMPREHEN METABOLIC PANEL: CPT

## 2021-11-18 PROCEDURE — 83615 LACTATE (LD) (LDH) ENZYME: CPT

## 2021-11-18 PROCEDURE — 36415 COLL VENOUS BLD VENIPUNCTURE: CPT

## 2021-11-18 PROCEDURE — 83735 ASSAY OF MAGNESIUM: CPT

## 2021-11-18 PROCEDURE — 85384 FIBRINOGEN ACTIVITY: CPT

## 2021-11-18 PROCEDURE — 84145 PROCALCITONIN (PCT): CPT

## 2021-11-18 PROCEDURE — XW0DXM6 INTRODUCTION OF BARICITINIB INTO MOUTH AND PHARYNX, EXTERNAL APPROACH, NEW TECHNOLOGY GROUP 6: ICD-10-PCS | Performed by: FAMILY MEDICINE

## 2021-11-18 PROCEDURE — 6360000002 HC RX W HCPCS: Performed by: INTERNAL MEDICINE

## 2021-11-18 PROCEDURE — 99223 1ST HOSP IP/OBS HIGH 75: CPT | Performed by: INTERNAL MEDICINE

## 2021-11-18 PROCEDURE — 83605 ASSAY OF LACTIC ACID: CPT

## 2021-11-18 PROCEDURE — 6370000000 HC RX 637 (ALT 250 FOR IP): Performed by: FAMILY MEDICINE

## 2021-11-18 PROCEDURE — 82728 ASSAY OF FERRITIN: CPT

## 2021-11-18 PROCEDURE — 85378 FIBRIN DEGRADE SEMIQUANT: CPT

## 2021-11-18 PROCEDURE — 2140000000 HC CCU INTERMEDIATE R&B

## 2021-11-18 PROCEDURE — 84484 ASSAY OF TROPONIN QUANT: CPT

## 2021-11-18 RX ORDER — SODIUM CHLORIDE 9 MG/ML
INJECTION, SOLUTION INTRAVENOUS CONTINUOUS
Status: DISCONTINUED | OUTPATIENT
Start: 2021-11-18 | End: 2021-11-18 | Stop reason: CLARIF

## 2021-11-18 RX ORDER — DEXAMETHASONE SODIUM PHOSPHATE 10 MG/ML
10 INJECTION, SOLUTION INTRAMUSCULAR; INTRAVENOUS EVERY 12 HOURS
Status: DISCONTINUED | OUTPATIENT
Start: 2021-11-18 | End: 2021-11-21 | Stop reason: HOSPADM

## 2021-11-18 RX ORDER — ONDANSETRON 2 MG/ML
4 INJECTION INTRAMUSCULAR; INTRAVENOUS EVERY 6 HOURS PRN
Status: DISCONTINUED | OUTPATIENT
Start: 2021-11-18 | End: 2021-11-21 | Stop reason: HOSPADM

## 2021-11-18 RX ORDER — ALBUTEROL SULFATE 2.5 MG/3ML
2.5 SOLUTION RESPIRATORY (INHALATION) EVERY 4 HOURS PRN
Status: DISCONTINUED | OUTPATIENT
Start: 2021-11-18 | End: 2021-11-21 | Stop reason: HOSPADM

## 2021-11-18 RX ORDER — SODIUM CHLORIDE 9 MG/ML
25 INJECTION, SOLUTION INTRAVENOUS PRN
Status: DISCONTINUED | OUTPATIENT
Start: 2021-11-18 | End: 2021-11-21 | Stop reason: HOSPADM

## 2021-11-18 RX ORDER — ACETAMINOPHEN 325 MG/1
650 TABLET ORAL EVERY 6 HOURS PRN
Status: DISCONTINUED | OUTPATIENT
Start: 2021-11-18 | End: 2021-11-21 | Stop reason: HOSPADM

## 2021-11-18 RX ORDER — AMLODIPINE BESYLATE 10 MG/1
10 TABLET ORAL DAILY
Status: DISCONTINUED | OUTPATIENT
Start: 2021-11-18 | End: 2021-11-21 | Stop reason: HOSPADM

## 2021-11-18 RX ORDER — ACETAMINOPHEN 650 MG/1
650 SUPPOSITORY RECTAL EVERY 6 HOURS PRN
Status: DISCONTINUED | OUTPATIENT
Start: 2021-11-18 | End: 2021-11-21 | Stop reason: HOSPADM

## 2021-11-18 RX ORDER — GUAIFENESIN/DEXTROMETHORPHAN 100-10MG/5
5 SYRUP ORAL EVERY 4 HOURS PRN
Status: DISCONTINUED | OUTPATIENT
Start: 2021-11-18 | End: 2021-11-21 | Stop reason: HOSPADM

## 2021-11-18 RX ORDER — ONDANSETRON 4 MG/1
4 TABLET, ORALLY DISINTEGRATING ORAL EVERY 8 HOURS PRN
Status: DISCONTINUED | OUTPATIENT
Start: 2021-11-18 | End: 2021-11-21 | Stop reason: HOSPADM

## 2021-11-18 RX ORDER — BUDESONIDE AND FORMOTEROL FUMARATE DIHYDRATE 160; 4.5 UG/1; UG/1
2 AEROSOL RESPIRATORY (INHALATION) 2 TIMES DAILY
Status: DISCONTINUED | OUTPATIENT
Start: 2021-11-18 | End: 2021-11-19

## 2021-11-18 RX ORDER — METOPROLOL SUCCINATE 50 MG/1
50 TABLET, EXTENDED RELEASE ORAL 2 TIMES DAILY
Status: DISCONTINUED | OUTPATIENT
Start: 2021-11-18 | End: 2021-11-21 | Stop reason: HOSPADM

## 2021-11-18 RX ORDER — ATORVASTATIN CALCIUM 40 MG/1
40 TABLET, FILM COATED ORAL DAILY
Status: DISCONTINUED | OUTPATIENT
Start: 2021-11-18 | End: 2021-11-21 | Stop reason: HOSPADM

## 2021-11-18 RX ORDER — LISINOPRIL 20 MG/1
20 TABLET ORAL DAILY
Status: DISCONTINUED | OUTPATIENT
Start: 2021-11-18 | End: 2021-11-21 | Stop reason: HOSPADM

## 2021-11-18 RX ORDER — ZINC SULFATE 50(220)MG
50 CAPSULE ORAL DAILY
Status: DISCONTINUED | OUTPATIENT
Start: 2021-11-18 | End: 2021-11-21 | Stop reason: HOSPADM

## 2021-11-18 RX ORDER — ASCORBIC ACID 500 MG
500 TABLET ORAL DAILY
Status: DISCONTINUED | OUTPATIENT
Start: 2021-11-18 | End: 2021-11-21 | Stop reason: HOSPADM

## 2021-11-18 RX ORDER — POLYETHYLENE GLYCOL 3350 17 G/17G
17 POWDER, FOR SOLUTION ORAL DAILY PRN
Status: DISCONTINUED | OUTPATIENT
Start: 2021-11-18 | End: 2021-11-21 | Stop reason: HOSPADM

## 2021-11-18 RX ORDER — CHOLECALCIFEROL (VITAMIN D3) 50 MCG
2000 TABLET ORAL DAILY
Status: DISCONTINUED | OUTPATIENT
Start: 2021-11-18 | End: 2021-11-21 | Stop reason: HOSPADM

## 2021-11-18 RX ORDER — CLOPIDOGREL BISULFATE 75 MG/1
75 TABLET ORAL DAILY
Status: DISCONTINUED | OUTPATIENT
Start: 2021-11-18 | End: 2021-11-21 | Stop reason: HOSPADM

## 2021-11-18 RX ORDER — SODIUM CHLORIDE 0.9 % (FLUSH) 0.9 %
5-40 SYRINGE (ML) INJECTION PRN
Status: DISCONTINUED | OUTPATIENT
Start: 2021-11-18 | End: 2021-11-21 | Stop reason: HOSPADM

## 2021-11-18 RX ORDER — ASPIRIN 81 MG/1
81 TABLET, CHEWABLE ORAL DAILY
Status: DISCONTINUED | OUTPATIENT
Start: 2021-11-18 | End: 2021-11-21 | Stop reason: HOSPADM

## 2021-11-18 RX ORDER — FAMOTIDINE 20 MG/1
20 TABLET, FILM COATED ORAL 2 TIMES DAILY
Status: DISCONTINUED | OUTPATIENT
Start: 2021-11-18 | End: 2021-11-21 | Stop reason: HOSPADM

## 2021-11-18 RX ORDER — SODIUM CHLORIDE 0.9 % (FLUSH) 0.9 %
5-40 SYRINGE (ML) INJECTION EVERY 12 HOURS SCHEDULED
Status: DISCONTINUED | OUTPATIENT
Start: 2021-11-18 | End: 2021-11-21 | Stop reason: HOSPADM

## 2021-11-18 RX ORDER — SODIUM CHLORIDE 9 MG/ML
INJECTION, SOLUTION INTRAVENOUS CONTINUOUS
Status: ACTIVE | OUTPATIENT
Start: 2021-11-18 | End: 2021-11-18

## 2021-11-18 RX ORDER — ISOSORBIDE MONONITRATE 30 MG/1
30 TABLET, EXTENDED RELEASE ORAL DAILY
Status: DISCONTINUED | OUTPATIENT
Start: 2021-11-18 | End: 2021-11-21 | Stop reason: HOSPADM

## 2021-11-18 RX ORDER — IPRATROPIUM BROMIDE AND ALBUTEROL SULFATE 2.5; .5 MG/3ML; MG/3ML
1 SOLUTION RESPIRATORY (INHALATION)
Status: DISCONTINUED | OUTPATIENT
Start: 2021-11-18 | End: 2021-11-18

## 2021-11-18 RX ORDER — DEXAMETHASONE SODIUM PHOSPHATE 10 MG/ML
6 INJECTION, SOLUTION INTRAMUSCULAR; INTRAVENOUS EVERY 24 HOURS
Status: DISCONTINUED | OUTPATIENT
Start: 2021-11-18 | End: 2021-11-18

## 2021-11-18 RX ADMIN — AMLODIPINE BESYLATE 10 MG: 10 TABLET ORAL at 09:21

## 2021-11-18 RX ADMIN — LISINOPRIL 20 MG: 20 TABLET ORAL at 21:45

## 2021-11-18 RX ADMIN — FAMOTIDINE 20 MG: 20 TABLET ORAL at 21:45

## 2021-11-18 RX ADMIN — DEXAMETHASONE SODIUM PHOSPHATE 6 MG: 10 INJECTION INTRAMUSCULAR; INTRAVENOUS at 09:21

## 2021-11-18 RX ADMIN — METOPROLOL SUCCINATE 50 MG: 50 TABLET, EXTENDED RELEASE ORAL at 09:17

## 2021-11-18 RX ADMIN — BARICITINIB 4 MG: 2 TABLET, FILM COATED ORAL at 09:17

## 2021-11-18 RX ADMIN — CLOPIDOGREL BISULFATE 75 MG: 75 TABLET ORAL at 09:20

## 2021-11-18 RX ADMIN — Medication 10 ML: at 21:45

## 2021-11-18 RX ADMIN — ENOXAPARIN SODIUM 30 MG: 100 INJECTION SUBCUTANEOUS at 09:23

## 2021-11-18 RX ADMIN — METOPROLOL SUCCINATE 50 MG: 50 TABLET, EXTENDED RELEASE ORAL at 17:56

## 2021-11-18 RX ADMIN — ASPIRIN 81 MG CHEWABLE TABLET 81 MG: 81 TABLET CHEWABLE at 09:18

## 2021-11-18 RX ADMIN — ENOXAPARIN SODIUM 30 MG: 100 INJECTION SUBCUTANEOUS at 21:44

## 2021-11-18 RX ADMIN — ISOSORBIDE MONONITRATE 30 MG: 30 TABLET, EXTENDED RELEASE ORAL at 09:19

## 2021-11-18 RX ADMIN — DEXAMETHASONE SODIUM PHOSPHATE 10 MG: 10 INJECTION, SOLUTION INTRAMUSCULAR; INTRAVENOUS at 22:42

## 2021-11-18 RX ADMIN — OXYCODONE HYDROCHLORIDE AND ACETAMINOPHEN 500 MG: 500 TABLET ORAL at 09:19

## 2021-11-18 RX ADMIN — ATORVASTATIN CALCIUM 40 MG: 40 TABLET, FILM COATED ORAL at 21:45

## 2021-11-18 RX ADMIN — Medication 2000 UNITS: at 09:21

## 2021-11-18 RX ADMIN — Medication 10 ML: at 09:24

## 2021-11-18 RX ADMIN — Medication 50 MG: at 09:16

## 2021-11-18 RX ADMIN — FAMOTIDINE 20 MG: 20 TABLET ORAL at 09:17

## 2021-11-18 RX ADMIN — SODIUM CHLORIDE: 9 INJECTION, SOLUTION INTRAVENOUS at 02:07

## 2021-11-18 NOTE — CONSULTS
Pulmonary [facility]                             Pulmonary Consult/Progress Note :          Patient: Britton Castillo  MRN: [id number]  : 1950      Date of Admission: .2021  8:29 AM    Consulting Physician:Dr Ureña         Reason for Consultation:COVID pneumonia   CC :   HPI:   Britton Castillo is a 70y.o. year old who never smoke and has been having worsening SOB and COVID symptoms for the last 2 weeks     He  States he has beside worsening SOB ,genereal fatigue. This been going on for about 2 weeks. He also endorses lightheadedness, decreased appetite and cough. Cough is nonproductive. He denies fever, chills, chest pain, nausea, vomiting, abdominal pain. The patient did receive both his Covid vaccinations. Patient noted to drop to 83% while ambulating. Laboratory studies notable for sodium 130, lactic acid 2.7, glucose 127, proBNP 959, troponin 19. COVID-19 positive. CT chest shows multifocal bilateral groundglass and semisolid pulmonary infiltrates most consistent with Covid pneumonia.   Patient was then transferred to [facility] for further treatment      PAST MEDICAL HISTORY:   Past Medical History:   Diagnosis Date    Bilateral leg and foot pain     CAD (coronary artery disease)     follows with Dr Cheng Zuñiga Hypertension     Nasal mass 2020    STEMI (ST elevation myocardial infarction) ([facility] [address] 10/23/2018       PAST SURGICAL HISTORY:   Past Surgical History:   Procedure Laterality Date    CORONARY ANGIOPLASTY WITH STENT PLACEMENT  10/23/2018    3.5 x 26 Resolute José to mid RCA by Dr. Jamilah Richardson N/A 2020    EXCISION RIGHT NASAL LESION WITH RECONSTRUCTION WITH FROZEN SECTION performed by Claribel Hallman DO at [facility] OR       FAMILY HISTORY:   Family History   Problem Relation Age of Onset    High Blood Pressure Mother     Heart Disease Mother     Dementia Mother     Diabetes Mother     Cancer Father 62        lung, tongue, brain    Heart Attack Sister     Heart Attack Brother        SOCIAL HISTORY:   Social History     Socioeconomic History    Marital status:      Spouse name: Not on file    Number of children: Not on file    Years of education: Not on file    Highest education level: Not on file   Occupational History    Not on file   Tobacco Use    Smoking status: Never Smoker    Smokeless tobacco: Never Used   Vaping Use    Vaping Use: Never used   Substance and Sexual Activity    Alcohol use: No     Comment: no alcohol x 50 years    Drug use: No    Sexual activity: Not Currently   Other Topics Concern    Not on file   Social History Narrative    Drinks 2 cups of coffee daily. Social Determinants of Health     Financial Resource Strain:     Difficulty of Paying Living Expenses: Not on file   Food Insecurity:     Worried About Running Out of Food in the Last Year: Not on file    Albert of Food in the Last Year: Not on file   Transportation Needs:     Lack of Transportation (Medical): Not on file    Lack of Transportation (Non-Medical):  Not on file   Physical Activity:     Days of Exercise per Week: Not on file    Minutes of Exercise per Session: Not on file   Stress:     Feeling of Stress : Not on file   Social Connections:     Frequency of Communication with Friends and Family: Not on file    Frequency of Social Gatherings with Friends and Family: Not on file    Attends Sikh Services: Not on file    Active Member of Clubs or Organizations: Not on file    Attends Club or Organization Meetings: Not on file    Marital Status: Not on file   Intimate Partner Violence:     Fear of Current or Ex-Partner: Not on file    Emotionally Abused: Not on file    Physically Abused: Not on file    Sexually Abused: Not on file   Housing Stability:     Unable to Pay for Housing in the Last Year: Not on file    Number of [place] in the Last Year: Not on file    Daily  aspirin chewable tablet 81 mg, 81 mg, Oral, Daily  atorvastatin (LIPITOR) tablet 40 mg, 40 mg, Oral, Daily  clopidogrel (PLAVIX) tablet 75 mg, 75 mg, Oral, Daily  famotidine (PEPCID) tablet 20 mg, 20 mg, Oral, BID  isosorbide mononitrate (IMDUR) extended release tablet 30 mg, 30 mg, Oral, Daily  lisinopril (PRINIVIL;ZESTRIL) tablet 20 mg, 20 mg, Oral, Daily  metoprolol succinate (TOPROL XL) extended release tablet 50 mg, 50 mg, Oral, BID  sodium chloride flush 0.9 % injection 5-40 mL, 5-40 mL, IntraVENous, 2 times per day  sodium chloride flush 0.9 % injection 5-40 mL, 5-40 mL, IntraVENous, PRN  0.9 % sodium chloride infusion, 25 mL, IntraVENous, PRN  enoxaparin (LOVENOX) injection 30 mg, 30 mg, SubCUTAneous, BID  ondansetron (ZOFRAN-ODT) disintegrating tablet 4 mg, 4 mg, Oral, Q8H PRN **OR** ondansetron (ZOFRAN) injection 4 mg, 4 mg, IntraVENous, Q6H PRN  polyethylene glycol (GLYCOLAX) packet 17 g, 17 g, Oral, Daily PRN  acetaminophen (TYLENOL) tablet 650 mg, 650 mg, Oral, Q6H PRN **OR** acetaminophen (TYLENOL) suppository 650 mg, 650 mg, Rectal, Q6H PRN  ascorbic acid (VITAMIN C) tablet 500 mg, 500 mg, Oral, Daily  zinc sulfate (ZINCATE) capsule 50 mg, 50 mg, Oral, Daily  albuterol (PROVENTIL) nebulizer solution 2.5 mg, 2.5 mg, Nebulization, Q4H PRN  dexamethasone (PF) (DECADRON) injection 6 mg, 6 mg, IntraVENous, Q24H  guaiFENesin-dextromethorphan (ROBITUSSIN DM) 100-10 MG/5ML syrup 5 mL, 5 mL, Oral, Q4H PRN  Vitamin D (CHOLECALCIFEROL) tablet 2,000 Units, 2,000 Units, Oral, Daily  baricitinib (OLUMIANT) tablet 4 mg, 4 mg, Oral, Daily  budesonide-formoterol (SYMBICORT) 160-4.5 MCG/ACT inhaler 2 puff, 2 puff, Inhalation, BID    IV MEDICATIONS:   sodium chloride         ALLERGIES:  No Known Allergies    REVIEW OF SYSTEMS:  General ROS:  + fatigue     ENT ROS:   No Sore throat ,no lymphoadenopathy,no nasal stuffiness     Hematological and Lymphatic ROS:   No ecchymosis ,no tendency to bleed  Respiratory ROS: SOB   Cardiovascular ROS:   No CP,No Palpitation   Gastrointestinal ROS:   No Gi bleed,no nausea or vomiting      - Musculoskeletal ROS:      - no joint swelling ,no joint pain   Neurological ROS:     -no weakness or numbness    Dermatological ROS:   No skin rash ,no urticaria     PHYSICAL EXAMINATION:     VITAL SIGNS:  BP (!) 148/81   Pulse 86   Temp 96.7 °F (35.9 °C) (Temporal)   Resp 20   Ht 5' 8\" (1.727 m)   Wt 221 lb (100.2 kg)   SpO2 92%   BMI 33.60 kg/m²   Wt Readings from Last 3 Encounters:   11/17/21 221 lb (100.2 kg)   10/19/21 220 lb (99.8 kg)   07/20/21 233 lb (105.7 kg)     Temp Readings from Last 3 Encounters:   11/18/21 96.7 °F (35.9 °C) (Temporal)   10/19/21 97.3 °F (36.3 °C)   07/20/21 97.3 °F (36.3 °C) (Temporal)     TMAX:  BP Readings from Last 3 Encounters:   11/18/21 (!) 148/81   10/19/21 (!) 149/86   07/20/21 126/83     Pulse Readings from Last 3 Encounters:   11/18/21 86   10/19/21 61   07/20/21 56           INTAKE/OUTPUTS:  No intake/output data recorded.   No intake or output data in the 24 hours ending 11/18/21 1405    General Appearance: alert and oriented to person, place and time, well-developed and   well-nourished, in no acute distress   Eyes: pupils equal, round, and reactive to light, extraocular eye movements intact, conjunctivae normal and sclera anicteric   Neck: neck supple and non tender without mass, no thyromegaly, no thyroid nodules and no cervical adenopathy   Pulmonary/Chest:scattred rhonchi   Cardiovascular: normal rate, regular rhythm, normal S1 and S2, no murmurs, rubs, clicks or gallops, distal pulses intact, no carotid bruits, no murmurs, no gallops, no carotid bruits and no JVD   Abdomen: obese, soft, non-tender, non-distended, normal bowel sounds, no masses or organomegaly   Extremities:No edema or cynossis   Musculoskeletal: normal range of motion, no joint swelling, deformity or tenderness   Neurologic: reflexes normal and symmetric, no cranial nerve deficit noted    LABS/IMAGING:    CBC:  Lab Results   Component Value Date    WBC 10.4 11/18/2021    HGB 12.1 (L) 11/18/2021    HCT 35.9 (L) 11/18/2021    MCV 90.4 11/18/2021     11/18/2021    LYMPHOPCT 8.3 (L) 11/18/2021    RBC 3.97 11/18/2021    MCH 30.5 11/18/2021    MCHC 33.7 11/18/2021    RDW 12.2 11/18/2021    NEUTOPHILPCT 84.6 (H) 11/18/2021    MONOPCT 6.3 11/18/2021    BASOPCT 0.1 11/18/2021    NEUTROABS 8.80 (H) 11/18/2021    LYMPHSABS 0.86 (L) 11/18/2021    MONOSABS 0.66 11/18/2021    EOSABS 0.00 (L) 11/18/2021    BASOSABS 0.01 11/18/2021       Recent Labs     11/18/21  0736 11/18/21  0038 11/17/21  0910   WBC 10.4 8.0 7.6   HGB 12.1* 13.4 13.2   HCT 35.9* 39.9 38.3   MCV 90.4 93.4 89.9    406 302       BMP:   Recent Labs     11/17/21  0910 11/18/21  0038 11/18/21  0736   * 131* 138   K 4.2 3.8 4.3   CL 92* 94* 104   CO2 19* 18* 20*   BUN 14 18 17   CREATININE 1.1 1.2 1.0       MG:   Lab Results   Component Value Date    MG 2.2 11/18/2021     Ca/Phos:   Lab Results   Component Value Date    CALCIUM 9.2 11/18/2021    PHOS 2.9 07/26/2021     Amylase: No results found for: AMYLASE  Lipase:   Lab Results   Component Value Date    LIPASE 29 11/17/2021     LIVER PROFILE:   Recent Labs     11/17/21  0910 11/18/21  0038 11/18/21  0736   AST 34 36 24   ALT 21 31 24   LIPASE 29  --   --    BILITOT 0.9 0.8 0.5   ALKPHOS 84 94 78       PT/INR:   Recent Labs     11/17/21  0910   PROTIME 15.4*   INR 1.4     APTT: No results for input(s): APTT in the last 72 hours.     Cardiac Enzymes:  Lab Results   Component Value Date    TROPONINI <0.01 10/23/2018               CT Bl GGO     PROBLEM LIST:  Patient Active Problem List   Diagnosis    [facility]) [facility]    Essential hypertension    Coronary artery disease involving native coronary artery of native heart without angina pectoris    CKD (chronic kidney disease), stage III ([facility]    [facility]  Neuropathy of both feet    Basal cell carcinoma (BCC) of skin of nose    Pneumonia due to COVID-19 virus               ASSESSMENT:  1.) COVID Pneumonia  2. )Acute hypoxic respiratory failure   3.)r/o Bacterial pneumonia         PLAN:  *-*- Wean O2 as tolerated   *- Start Decadron   *- Start Remedisvir   *-giving the diffuse infiltrate in the chest and since inflammatory markers is high then  I would recommend that the patient starting on Tocilizumab  And will stop Barcitonib   *-Follow-up chest x-ray as needed  *-Avoid fluid overload  *_Patient can get worse if so we will transfer to the medical ICU  Check procalcitonin   If hypoxia worse will Start OptiFlow soon and BiPAP as needed   Aggressive pulmonary toilet  Albuterol as needed  High dose steroids  0.5 mg Lovenox bid ,since high D dimer but no PE        Thank you very much for allowing me to participate in the care of this pleasant patient , should you have any questions ,please do not hesitate to contact me      Vinny Lowe MD,FCCP  Pulmonary&Critical Care Medicine   Director of [address] [place] [address] Director of [address] [place] [address]    Claudean Margarita    NOTE: This report was transcribed using voice recognition software. Every effort was made to ensure accuracy; however, inadvertent computerized transcription errors may be present.

## 2021-11-18 NOTE — H&P
Hospitalist History & Physical      PCP: Petr Gonzalez MD    Date of Service: Pt seen/examined on 11/18/2021    Chief Complaint:  had concerns including Shortness of Breath (SOB with exertion for past 2 weeks) and Anorexia (Poor appetite for about a week ). History Of Present Illness:    Mr. Rolanda Felder, a 70y.o. year old male  who  has a past medical history of Bilateral leg and foot pain, CAD (coronary artery disease), Hypertension, Nasal mass, and STEMI (ST elevation myocardial infarction) ([facility] Patient presented to outside emergency department with complaints of fatigue. This been going on for about 2 weeks. He also endorses lightheadedness, decreased appetite and cough. Cough is nonproductive. He denies fever, chills, chest pain, nausea, vomiting, abdominal pain. The patient did receive both his Covid vaccinations. Patient noted to drop to 83% while ambulating. Laboratory studies notable for sodium 130, lactic acid 2.7, glucose 127, proBNP 959, troponin 19. COVID-19 positive. CT chest shows multifocal bilateral groundglass and semisolid pulmonary infiltrates most consistent with Covid pneumonia. Patient was then transferred to [facility] for further treatment. Past Medical History:   Diagnosis Date    Bilateral leg and foot pain     CAD (coronary artery disease)     follows with Dr Dunn Burn Hypertension     Nasal mass 11/2020    [facility]       Past Surgical History:   Procedure Laterality Date    CORONARY ANGIOPLASTY WITH STENT PLACEMENT  10/23/2018    3.5 x 26 Resolute José to mid RCA by Dr. Heller Sheets 11/16/2020    EXCISION RIGHT NASAL LESION WITH RECONSTRUCTION WITH FROZEN SECTION performed by Beth Mccabe DO at [facility]       Prior to Admission medications    Medication Sig Start Date End Date Taking?  Authorizing Provider   atorvastatin (LIPITOR) 40 MG tablet Take 1 tablet by mouth daily 9/14/21   Sara Liao MD   nitroGLYCERIN (NITROSTAT) 0.4 MG SL tablet Place 1 tablet under the tongue every 5 minutes as needed for Chest pain 9/14/21   Sara Liao, MD   amLODIPine (NORVASC) 10 MG tablet Take 1 tablet by mouth daily 7/20/21   Sara Foot, MD   isosorbide mononitrate (IMDUR) 30 MG extended release tablet Take 1 tablet by mouth daily 7/20/21   Sara Foot, MD   clopidogrel (PLAVIX) 75 MG tablet Take 1 tablet by mouth daily 7/20/21   Sara Foot, MD   famotidine (PEPCID) 20 MG tablet Take 1 tablet by mouth 2 times daily 7/20/21   Sara Foot, MD   lisinopril (PRINIVIL;ZESTRIL) 20 MG tablet Take 1 tablet by mouth daily 7/20/21   Sara Foot, MD   metoprolol succinate (TOPROL XL) 50 MG extended release tablet Take 1 tablet by mouth 2 times daily Take am dos 11/16 7/20/21   Sara Foot, MD   omeprazole (PRILOSEC) 40 MG delayed release capsule Take 1 capsule by mouth 2 times daily 7/20/21   Sara Keshawn, MD   Cholecalciferol (VITAMIN D3) 50 MCG (2000 UT) CAPS Take 1 capsule by mouth daily Ld 11/12    Historical Provider, MD   aspirin 81 MG chewable tablet Take 1 tablet by mouth daily  Patient taking differently: Take 81 mg by mouth daily Ld 11/09 11/26/18   [facility], DO         Allergies:  Patient has no known allergies. Social History:    TOBACCO:   reports that he has never smoked. He has never used smokeless tobacco.  ETOH:   reports no history of alcohol use. Family History:    Reviewed in detail and negative for DM, CAD, Cancer, CVA. Positive as follows\"      Problem Relation Age of Onset    High Blood Pressure Mother     Heart Disease Mother     Dementia Mother     Diabetes Mother     Cancer Father 62        lung, tongue, brain    Heart Attack Sister     Heart Attack Brother        REVIEW OF SYSTEMS:   Pertinent positives as noted in the HPI.  All other systems reviewed and negative. PHYSICAL EXAM:  BP (!) 145/91   Pulse 85   Temp 98 °F (36.7 °C) (Oral)   Resp 20   Ht 5' 8\" (1.727 m)   Wt 221 lb (100.2 kg)   SpO2 98%   BMI 33.60 kg/m²     Due to the ongoing COVID-19 pandemic this patient was not seen/examined face-to-face in an effort to conserve PPE and reduce spread      CBC:   Recent Labs     11/17/21  0910   WBC 7.6   RBC 4.26   HGB 13.2   HCT 38.3   MCV 89.9   RDW 12.1        BMP:   Recent Labs     11/17/21  0910   *   K 4.2   CL 92*   CO2 19*   BUN 14   CREATININE 1.1     LFT:  Recent Labs     11/17/21 0910   PROT 7.2   ALKPHOS 84   ALT 21   AST 34   BILITOT 0.9   LIPASE 29     CE:  No results for input(s): Curlie Lat in the last 72 hours. PT/INR:   Recent Labs     11/17/21  0910   INR 1.4     BNP: No results for input(s): BNP in the last 72 hours. ESR:   Lab Results   Component Value Date    SEDRATE 16 (H) 06/03/2019     CRP: No results found for: CRP  D Dimer:   Lab Results   Component Value Date    DDIMER 660 11/17/2021      Folate and B12: No results found for: [id number], No results found for: FOLATE  Lactic Acid:   Lab Results   Component Value Date    LACTA 2.7 (H) 11/17/2021     Thyroid Studies:   Lab Results   Component Value Date    TSH 0.964 09/07/2018       Oupatient labs:  Lab Results   Component Value Date    CHOL 150 02/08/2021    TRIG 134 02/08/2021    HDL 54 02/08/2021    LDLCALC 69 02/08/2021    TSH 0.964 09/07/2018    INR 1.4 11/17/2021    LABA1C 6.4 (H) 02/08/2021       Urinalysis:  No results found for: NITRU, WBCUA, BACTERIA, RBCUA, BLOODU, SPECGRAV, GLUCOSEU    Imaging:  XR CHEST PORTABLE    Result Date: 11/17/2021  EXAMINATION: ONE XRAY VIEW OF THE CHEST 11/17/2021 8:51 am COMPARISON: None. HISTORY: ORDERING SYSTEM PROVIDED HISTORY: SOB TECHNOLOGIST PROVIDED HISTORY: Reason for exam:->SOB FINDINGS: The cardiac silhouette is mildly enlarged. No findings of failure.  Patchy bilateral peripheral pulmonary infiltrates are noted consistent with bilateral multifocal pneumonia. There is no right or left pleural effusion. 1. Peripheral patchy multifocal bilateral pneumonia. CTA PULMONARY W CONTRAST    Result Date: 11/17/2021  EXAMINATION: CTA OF THE CHEST 11/17/2021 12:02 pm TECHNIQUE: CTA of the chest was performed after the administration of intravenous contrast.  Multiplanar reformatted images are provided for review. MIP images are provided for review. Dose modulation, iterative reconstruction, and/or weight based adjustment of the mA/kV was utilized to reduce the radiation dose to as low as reasonably achievable. COMPARISON: None. HISTORY: ORDERING SYSTEM PROVIDED HISTORY: SOB, hypoxic TECHNOLOGIST PROVIDED HISTORY: Reason for exam:->SOB, hypoxic Decision Support Exception - unselect if not a suspected or confirmed emergency medical condition->Emergency Medical Condition (MA) FINDINGS: Pulmonary Arteries: Pulmonary arteries are adequately opacified for evaluation. No evidence of intraluminal filling defect to suggest pulmonary embolism. Main pulmonary artery is normal in caliber. Mediastinum: No evidence of mediastinal lymphadenopathy. The heart and pericardium demonstrate no acute abnormality. There is no acute abnormality of the thoracic aorta. Lungs/pleura: There are bilateral multifocal ground-glass and semi solid pulmonary infiltrates most consistent with COVID pneumonia. Upper Abdomen: There is a moderate to large hiatal hernia. Soft Tissues/Bones: There is significant kyphosis of the thoracic spine     1. There is no evidence of a pulmonary embolus 2. Multifocal bilateral ground-glass and semi solid pulmonary infiltrates most consistent with COVID pneumonia. 3. Significant kyphosis of the thoracic spine. There is no compression fracture.        ASSESSMENT:  -COVID-19 pneumonia  -Acute hypoxic respiratory failure  -Lactic acidosis  -Elevated troponin  -Hypertension  -Coronary artery disease      PLAN:  -Admit to medicine  -Pharmacy to dose baricitinib  -Dexamethasone 6 mg IV daily  -Lovenox 30 mg twice daily  -Monitor serial inflammatory markers  -Vitamin C, vitamin D, zinc  -DuoNebs every 4 hours  -Albuterol nebs every 6 hours as needed  -Continue home medications        Diet: ADULT DIET; Regular  Code Status: Prior  Surrogate decision maker confirmed with patient:   Extended Emergency Contact Information  Primary Emergency Contact: GuanacoNicolle  Address: [address] [place] [address] Phone: [phone]  Mobile Phone: [phone]  Relation: Brother/Sister  Preferred language: English   needed? No    DVT Prophylaxis: []Lovenox []Heparin []PCD [] [address] []Encouraged ambulation  Disposition: []Med/Surg [] Intermediate [] ICU/CCU  Admit status: [] Observation [] Inpatient     +++++++++++++++++++++++++++++++++++++++++++++++++  eJy Niño DO  [address]  +++++++++++++++++++++++++++++++++++++++++++++++++  NOTE: This report was transcribed using voice recognition software. Every effort was made to ensure accuracy; however, inadvertent computerized transcription errors may be present.

## 2021-11-18 NOTE — PROGRESS NOTES
Hospitalist Progress Note      SYNOPSIS: Patient admitted on 2021 for COVID-19    SUBJECTIVE:    Patient seen and examined  Records reviewed. Is a nonbillable needs patient was seen and admitted after midnight. He does report improvement in his breathing denies any chest pain abdominal pain or nausea also reports improved oral intake. Stable overnight. No other overnight issues reported. Temp (24hrs), Av.9 °F (36.6 °C), Min:96.7 °F (35.9 °C), Max:98.8 °F (37.1 °C)    DIET: ADULT DIET; Regular  CODE: Full Code  No intake or output data in the 24 hours ending 21 3938    OBJECTIVE:    BP (!) 148/81   Pulse 86   Temp 96.7 °F (35.9 °C) (Temporal)   Resp 20   Ht 5' 8\" (1.727 m)   Wt 221 lb (100.2 kg)   SpO2 92%   BMI 33.60 kg/m²     General appearance: No apparent distress, appears stated age and cooperative. HEENT:  Conjunctivae/corneas clear. Neck: Supple. No jugular venous distention. Respiratory: Mild coarseness at bases to auscultation bilaterally, normal respiratory effort  Cardiovascular: Regular rate rhythm, normal S1-S2  Abdomen: Soft, nontender, nondistended  Musculoskeletal: No clubbing, cyanosis, no bilateral lower extremity edema. Brisk capillary refill. Skin:  No rashes  on visible skin  Neurologic: awake, alert and following commands         ASSESSMENT:  -COVID-19 pneumonia  -Acute hypoxic respiratory failure  -Lactic acidosis likely from dehydration  -Hypertension  -Coronary artery disease     Plan  -We'll at this time continue dexamethasone, baricitinib, oxygen support wean as tolerated. Incentive spirometer.     DISPOSITION:    Medications:  REVIEWED DAILY    Infusion Medications    sodium chloride       Scheduled Medications    amLODIPine  10 mg Oral Daily    aspirin  81 mg Oral Daily    atorvastatin  40 mg Oral Daily    clopidogrel  75 mg Oral Daily    famotidine  20 mg Oral BID    isosorbide mononitrate  30 mg Oral Daily    lisinopril  20 mg Oral Daily    metoprolol succinate  50 mg Oral BID    sodium chloride flush  5-40 mL IntraVENous 2 times per day    enoxaparin  30 mg SubCUTAneous BID    ascorbic acid  500 mg Oral Daily    zinc sulfate  50 mg Oral Daily    dexamethasone  6 mg IntraVENous Q24H    vitamin D  2,000 Units Oral Daily    baricitinib  4 mg Oral Daily    budesonide-formoterol  2 puff Inhalation BID     PRN Meds: sodium chloride flush, sodium chloride, ondansetron **OR** ondansetron, polyethylene glycol, acetaminophen **OR** acetaminophen, albuterol, guaiFENesin-dextromethorphan    Labs:     Recent Labs     11/17/21  0910 11/18/21  0038 11/18/21  0736   WBC 7.6 8.0 10.4   HGB 13.2 13.4 12.1*   HCT 38.3 39.9 35.9*    406 338       Recent Labs     11/17/21  0910 11/18/21  0038 11/18/21  0736   * 131* 138   K 4.2 3.8 4.3   CL 92* 94* 104   CO2 19* 18* 20*   BUN 14 18 17   CREATININE 1.1 1.2 1.0   CALCIUM 9.5 9.7 9.2       Recent Labs     11/17/21  0910 11/18/21  0038 11/18/21  0736   PROT 7.2 7.8 6.4   ALKPHOS 84 94 78   ALT 21 31 24   AST 34 36 24   BILITOT 0.9 0.8 0.5   LIPASE 29  --   --        Recent Labs     11/17/21  0910   INR 1.4       No results for input(s): CKTOTAL, TROPONINI in the last 72 hours. Chronic labs:    Lab Results   Component Value Date    CHOL 150 02/08/2021    TRIG 134 02/08/2021    HDL 54 02/08/2021    LDLCALC 69 02/08/2021    TSH 0.964 09/07/2018    INR 1.4 11/17/2021    LABA1C 6.4 (H) 02/08/2021       Radiology: REVIEWED DAILY    +++++++++++++++++++++++++++++++++++++++++++++++++  Alberto Valdez MD  [facility] Physician - [facility] [place]  +++++++++++++++++++++++++++++++++++++++++++++++++  NOTE: This report was transcribed using voice recognition software. Every effort was made to ensure accuracy; however, inadvertent computerized transcription errors may be present.

## 2021-11-18 NOTE — PLAN OF CARE
Problem: Airway Clearance - Ineffective  Goal: Achieve or maintain patent airway  11/18/2021 1057 by Gilbert Alston RN  Outcome: Ongoing  11/18/2021 0632 by Prashant Morton RN  Outcome: Ongoing     Problem: Gas Exchange - Impaired  Goal: Absence of hypoxia  11/18/2021 1057 by Gilbert Alston RN  Outcome: Ongoing  11/18/2021 0632 by Prashant Morton RN  Outcome: Ongoing  Goal: Promote optimal lung function  11/18/2021 1057 by Gilbert Alston RN  Outcome: Ongoing  11/18/2021 0632 by Prashant Morton RN  Outcome: Ongoing     Problem: Breathing Pattern - Ineffective  Goal: Ability to achieve and maintain a regular respiratory rate  11/18/2021 1057 by Gilbert Alston RN  Outcome: Ongoing  11/18/2021 0632 by Prashant Morton RN  Outcome: Ongoing     Problem:  Body Temperature -  Risk of, Imbalanced  Goal: Ability to maintain a body temperature within defined limits  11/18/2021 1057 by Gilbert Alston RN  Outcome: Ongoing  11/18/2021 0632 by Prashant Morton RN  Outcome: Ongoing  Goal: Will regain or maintain usual level of consciousness  11/18/2021 1057 by Gilbert Alston RN  Outcome: Ongoing  11/18/2021 0632 by Prashant Morton RN  Outcome: Ongoing  Goal: Complications related to the disease process, condition or treatment will be avoided or minimized  11/18/2021 1057 by Gilbert Alston RN  Outcome: Ongoing  11/18/2021 0632 by Prashant Morton RN  Outcome: Ongoing     Problem: Isolation Precautions - Risk of Spread of Infection  Goal: Prevent transmission of infection  11/18/2021 1057 by Gilbert Alston RN  Outcome: Ongoing  11/18/2021 0632 by Prashant Morton RN  Outcome: Ongoing     Problem: Nutrition Deficits  Goal: Optimize nutritional status  11/18/2021 1057 by Gilbert Alston RN  Outcome: Ongoing  11/18/2021 0632 by Prashant Morton RN  Outcome: Ongoing     Problem: Risk for Fluid Volume Deficit  Goal: Maintain normal heart rhythm  11/18/2021 1057 by Gilbert Alston RN  Outcome: Ongoing  11/18/2021 1157 by Lazarus Blum RN  Outcome: Ongoing  Goal: Maintain absence of muscle cramping  11/18/2021 1057 by Aura Amaral RN  Outcome: Ongoing  11/18/2021 0632 by Lazarus Blum RN  Outcome: Ongoing  Goal: Maintain normal serum potassium, sodium, calcium, phosphorus, and pH  11/18/2021 1057 by Aura Amaral RN  Outcome: Ongoing  11/18/2021 0632 by Lazarus Blum RN  Outcome: Ongoing     Problem: Loneliness or Risk for Loneliness  Goal: Demonstrate positive use of time alone when socialization is not possible  11/18/2021 1057 by Aura Amaral RN  Outcome: Ongoing  11/18/2021 0632 by Lazarus Blum RN  Outcome: Ongoing     Problem: Fatigue  Goal: Verbalize increase energy and improved vitality  11/18/2021 1057 by Aura Amaral RN  Outcome: Ongoing  11/18/2021 0632 by Lazarus Blum RN  Outcome: Ongoing     Problem: Patient Education: Go to Patient Education Activity  Goal: Patient/Family Education  11/18/2021 1057 by Aura Amaral RN  Outcome: Ongoing  11/18/2021 0632 by Lazarus Blum RN  Outcome: Ongoing     Problem: Skin Integrity:  Goal: Will show no infection signs and symptoms  Description: Will show no infection signs and symptoms  11/18/2021 1057 by Aura Amaral RN  Outcome: Ongoing  11/18/2021 0632 by Lazarus Blum RN  Outcome: Ongoing  Goal: Absence of new skin breakdown  Description: Absence of new skin breakdown  11/18/2021 1057 by Aura Amaral RN  Outcome: Ongoing  11/18/2021 0632 by Lazarus Blum RN  Outcome: Ongoing

## 2021-11-19 LAB
ALBUMIN SERPL-MCNC: 3 G/DL (ref 3.5–5.2)
ALP BLD-CCNC: 73 U/L (ref 40–129)
ALT SERPL-CCNC: 30 U/L (ref 0–40)
ANION GAP SERPL CALCULATED.3IONS-SCNC: 13 MMOL/L (ref 7–16)
AST SERPL-CCNC: 27 U/L (ref 0–39)
BASOPHILS ABSOLUTE: 0.02 E9/L (ref 0–0.2)
BASOPHILS RELATIVE PERCENT: 0.2 % (ref 0–2)
BILIRUB SERPL-MCNC: 0.4 MG/DL (ref 0–1.2)
BUN BLDV-MCNC: 27 MG/DL (ref 6–23)
C-REACTIVE PROTEIN: 3.9 MG/DL (ref 0–0.4)
CALCIUM SERPL-MCNC: 9.2 MG/DL (ref 8.6–10.2)
CHLORIDE BLD-SCNC: 104 MMOL/L (ref 98–107)
CO2: 20 MMOL/L (ref 22–29)
CREAT SERPL-MCNC: 1.1 MG/DL (ref 0.7–1.2)
D DIMER: 554 NG/ML DDU
EOSINOPHILS ABSOLUTE: 0 E9/L (ref 0.05–0.5)
EOSINOPHILS RELATIVE PERCENT: 0 % (ref 0–6)
FIBRINOGEN: >700 MG/DL (ref 225–540)
GFR AFRICAN AMERICAN: >60
GFR NON-AFRICAN AMERICAN: >60 ML/MIN/1.73
GLUCOSE BLD-MCNC: 139 MG/DL (ref 74–99)
HCT VFR BLD CALC: 34.6 % (ref 37–54)
HEMOGLOBIN: 11.5 G/DL (ref 12.5–16.5)
IMMATURE GRANULOCYTES #: 0.1 E9/L
IMMATURE GRANULOCYTES %: 0.8 % (ref 0–5)
L. PNEUMOPHILA SEROGP 1 UR AG: NORMAL
LACTIC ACID: 2.8 MMOL/L (ref 0.5–2.2)
LYMPHOCYTES ABSOLUTE: 0.89 E9/L (ref 1.5–4)
LYMPHOCYTES RELATIVE PERCENT: 6.7 % (ref 20–42)
MCH RBC QN AUTO: 30.5 PG (ref 26–35)
MCHC RBC AUTO-ENTMCNC: 33.2 % (ref 32–34.5)
MCV RBC AUTO: 91.8 FL (ref 80–99.9)
MONOCYTES ABSOLUTE: 0.45 E9/L (ref 0.1–0.95)
MONOCYTES RELATIVE PERCENT: 3.4 % (ref 2–12)
NEUTROPHILS ABSOLUTE: 11.73 E9/L (ref 1.8–7.3)
NEUTROPHILS RELATIVE PERCENT: 88.9 % (ref 43–80)
PDW BLD-RTO: 12.4 FL (ref 11.5–15)
PLATELET # BLD: 415 E9/L (ref 130–450)
PMV BLD AUTO: 9.2 FL (ref 7–12)
POTASSIUM REFLEX MAGNESIUM: 4 MMOL/L (ref 3.5–5)
PROCALCITONIN: 0.08 NG/ML (ref 0–0.08)
RBC # BLD: 3.77 E12/L (ref 3.8–5.8)
SODIUM BLD-SCNC: 137 MMOL/L (ref 132–146)
STREP PNEUMONIAE ANTIGEN, URINE: NORMAL
TOTAL PROTEIN: 6.4 G/DL (ref 6.4–8.3)
WBC # BLD: 13.2 E9/L (ref 4.5–11.5)

## 2021-11-19 PROCEDURE — 87449 NOS EACH ORGANISM AG IA: CPT

## 2021-11-19 PROCEDURE — 6360000002 HC RX W HCPCS: Performed by: INTERNAL MEDICINE

## 2021-11-19 PROCEDURE — 85384 FIBRINOGEN ACTIVITY: CPT

## 2021-11-19 PROCEDURE — 2140000000 HC CCU INTERMEDIATE R&B

## 2021-11-19 PROCEDURE — 6370000000 HC RX 637 (ALT 250 FOR IP): Performed by: FAMILY MEDICINE

## 2021-11-19 PROCEDURE — 80053 COMPREHEN METABOLIC PANEL: CPT

## 2021-11-19 PROCEDURE — 36415 COLL VENOUS BLD VENIPUNCTURE: CPT

## 2021-11-19 PROCEDURE — 2580000003 HC RX 258: Performed by: FAMILY MEDICINE

## 2021-11-19 PROCEDURE — 84145 PROCALCITONIN (PCT): CPT

## 2021-11-19 PROCEDURE — 85378 FIBRIN DEGRADE SEMIQUANT: CPT

## 2021-11-19 PROCEDURE — 85025 COMPLETE CBC W/AUTO DIFF WBC: CPT

## 2021-11-19 PROCEDURE — 83605 ASSAY OF LACTIC ACID: CPT

## 2021-11-19 PROCEDURE — 99233 SBSQ HOSP IP/OBS HIGH 50: CPT | Performed by: INTERNAL MEDICINE

## 2021-11-19 PROCEDURE — 86140 C-REACTIVE PROTEIN: CPT

## 2021-11-19 RX ORDER — BUDESONIDE 0.25 MG/2ML
1 INHALANT ORAL 2 TIMES DAILY
Status: DISCONTINUED | OUTPATIENT
Start: 2021-11-19 | End: 2021-11-21 | Stop reason: HOSPADM

## 2021-11-19 RX ADMIN — METOPROLOL SUCCINATE 50 MG: 50 TABLET, EXTENDED RELEASE ORAL at 08:47

## 2021-11-19 RX ADMIN — ASPIRIN 81 MG CHEWABLE TABLET 81 MG: 81 TABLET CHEWABLE at 08:46

## 2021-11-19 RX ADMIN — ISOSORBIDE MONONITRATE 30 MG: 30 TABLET, EXTENDED RELEASE ORAL at 08:46

## 2021-11-19 RX ADMIN — FAMOTIDINE 20 MG: 20 TABLET ORAL at 08:46

## 2021-11-19 RX ADMIN — FAMOTIDINE 20 MG: 20 TABLET ORAL at 21:32

## 2021-11-19 RX ADMIN — Medication 2000 UNITS: at 08:47

## 2021-11-19 RX ADMIN — Medication 10 ML: at 08:53

## 2021-11-19 RX ADMIN — DEXAMETHASONE SODIUM PHOSPHATE 10 MG: 10 INJECTION, SOLUTION INTRAMUSCULAR; INTRAVENOUS at 21:33

## 2021-11-19 RX ADMIN — ENOXAPARIN SODIUM 50 MG: 100 INJECTION SUBCUTANEOUS at 08:45

## 2021-11-19 RX ADMIN — Medication 50 MG: at 08:46

## 2021-11-19 RX ADMIN — LISINOPRIL 20 MG: 20 TABLET ORAL at 21:32

## 2021-11-19 RX ADMIN — ENOXAPARIN SODIUM 50 MG: 100 INJECTION SUBCUTANEOUS at 21:32

## 2021-11-19 RX ADMIN — CLOPIDOGREL BISULFATE 75 MG: 75 TABLET ORAL at 08:47

## 2021-11-19 RX ADMIN — OXYCODONE HYDROCHLORIDE AND ACETAMINOPHEN 500 MG: 500 TABLET ORAL at 08:46

## 2021-11-19 RX ADMIN — AMLODIPINE BESYLATE 10 MG: 10 TABLET ORAL at 08:46

## 2021-11-19 RX ADMIN — ATORVASTATIN CALCIUM 40 MG: 40 TABLET, FILM COATED ORAL at 21:32

## 2021-11-19 RX ADMIN — BUDESONIDE AND FORMOTEROL FUMARATE DIHYDRATE 2 PUFF: 160; 4.5 AEROSOL RESPIRATORY (INHALATION) at 09:38

## 2021-11-19 RX ADMIN — DEXAMETHASONE SODIUM PHOSPHATE 10 MG: 10 INJECTION, SOLUTION INTRAMUSCULAR; INTRAVENOUS at 08:48

## 2021-11-19 RX ADMIN — Medication 10 ML: at 21:44

## 2021-11-19 NOTE — ACP (ADVANCE CARE PLANNING)
[facility] [facility] Clinical Specialist  Conversation Note      Date of ACP Conversation: 11/19/2021    Conversation Conducted with:   Patient with [address]: Yanet Pastor  ACP Clinical Specialist: Elba Miller, RN      *When Decision Maker makes decisions on behalf of the incapacitated patient: Nanda Tanner is asked to consider and make decisions based on patient values, known preferences, or best interests. Current Designated Health Care Decision Maker:   Primary Decision Maker: Javier Carr - Brother/Sister - [phone]  (as entered in [address] field. Validate  this information as still accurate & up-to-date; edit [address] field as needed.)    If no Decision Maker listed above or available through scanned documents, then:    [address] [place] [address]   Who do you trust to make healthcare decisions for you? Name:   Angela Stone  Phone  Number: [phone]  Can this person be reached and be able to respond quickly, such as within a few minutes or hours? Yes    Who would be your back-up decision maker? Name n/a  Phone Number:n/a    For below questions, when conducting conversation with [address], substitute \"he\" and \"his\" for \"you\" and \"your\". Hospitalization  If your health were to worsen and it became clear that your chance of recovery was unlikely, what would your preferences be regarding hospitalization?:    Choice:  [x]  The patient would want hospitalization  []  The patient would prefer comfort-focused treatment without hospitalization. Ventilation  If you were in your present state of health and suddenly became very ill and were unable to breathe on your own, what would your preference be about the use of a ventilator (breathing machine) if it were available to you?       If patient would desire the use of a ventilator (breathing machine), answer \"yes\", if not \"no\":yes    If your health were to worsen and it became clear that your chance of recovery was unlikely, would that change your answer? yes    Resuscitation  CPR works best to restart the heart when there is a sudden event, like a heart attack, in someone who is otherwise healthy. Unfortunately, CPR does not typically restart the heart for people who have serious health conditions or who are very sick. In the event your heart stopped, would you want attempts to restart your heart (answer \"yes\") or would you prefer a natural death (answer \"no\")? yes    If your health were to worsen and it became clear that your chance of recovery was unlikely, would that change your answer? Yes    [] Yes  [] No   Educated Patient / Diallo Garnica regarding differences between Advance Directives and portable DNR orders.     Length of ACP Conversation in minutes:  10 minutes    Conversation Outcomes:  [x] ACP discussion completed  [] Existing advance directive reviewed with patient; no changes to patient's previously recorded wishes   [] New Advance Directive completed   [] Portable Do Not Rescitate prepared for Provider review and signature  [] POLST/POST/MOLST/MOST prepared for Provider review and signature      Follow-up plan:    [] Schedule follow-up conversation to continue planning  [x] Referred individual to Provider for additional questions/concerns   [] Advised patient/agent/surrogate to review completed ACP document and update if needed with changes in condition, patient preferences or care setting     [] This note routed to one or more involved healthcare providers

## 2021-11-19 NOTE — PROGRESS NOTES
Hospitalist Progress Note      SYNOPSIS: Patient admitted on 2021 for COVID-19    SUBJECTIVE:    Patient seen and examined  Records reviewed. Stable overnight. No other overnight issues reported. On 4L o2. Planes of some shortness of breath and cough. Chest pain abdominal pain nausea vomiting  Temp (24hrs), Av.2 °F (36.2 °C), Min:97 °F (36.1 °C), Max:97.6 °F (36.4 °C)    DIET: ADULT DIET; Regular  CODE: Full Code    Intake/Output Summary (Last 24 hours) at 2021 1013  Last data filed at 2021 0258  Gross per 24 hour   Intake --   Output 100 ml   Net -100 ml       OBJECTIVE:    /68   Pulse 67   Temp 97 °F (36.1 °C) (Temporal)   Resp 18   Ht 5' 8\" (1.727 m)   Wt 221 lb (100.2 kg)   SpO2 97%   BMI 33.60 kg/m²     General appearance: No apparent distress, appears stated age and cooperative. HEENT:  Conjunctivae/corneas clear. Neck: Supple. No jugular venous distention. Respiratory: Mild coarseness at bases to auscultation bilaterally, normal respiratory effort  Cardiovascular: Regular rate rhythm, normal S1-S2  Abdomen: Soft, nontender, nondistended  Musculoskeletal: No clubbing, cyanosis, no bilateral lower extremity edema. Brisk capillary refill. Skin:  No rashes  on visible skin  Neurologic: awake, alert and following commands         ASSESSMENT:  -COVID-19 pneumonia  -Acute hypoxic respiratory failure  -Lactic acidosis likely from dehydration  -Hypertension  -Coronary artery disease     Plan  -We'll at this time continue dexamethasone, baricitinib, oxygen support wean as tolerated. Incentive spirometer.     DISPOSITION:    Medications:  REVIEWED DAILY    Infusion Medications    sodium chloride       Scheduled Medications    amLODIPine  10 mg Oral Daily    aspirin  81 mg Oral Daily    atorvastatin  40 mg Oral Daily    clopidogrel  75 mg Oral Daily    famotidine  20 mg Oral BID    isosorbide mononitrate  30 mg Oral Daily    lisinopril  20 mg Oral Daily    metoprolol succinate  50 mg Oral BID    sodium chloride flush  5-40 mL IntraVENous 2 times per day    ascorbic acid  500 mg Oral Daily    zinc sulfate  50 mg Oral Daily    vitamin D  2,000 Units Oral Daily    budesonide-formoterol  2 puff Inhalation BID    dexamethasone  10 mg IntraVENous Q12H    enoxaparin  50 mg SubCUTAneous BID     PRN Meds: sodium chloride flush, sodium chloride, ondansetron **OR** ondansetron, polyethylene glycol, acetaminophen **OR** acetaminophen, albuterol, guaiFENesin-dextromethorphan    Labs:     Recent Labs     11/18/21  0038 11/18/21  0736 11/19/21  0812   WBC 8.0 10.4 13.2*   HGB 13.4 12.1* 11.5*   HCT 39.9 35.9* 34.6*    338 415       Recent Labs     11/18/21  0038 11/18/21  0736 11/19/21  0812   * 138 137   K 3.8 4.3 4.0   CL 94* 104 104   CO2 18* 20* 20*   BUN 18 17 27*   CREATININE 1.2 1.0 1.1   CALCIUM 9.7 9.2 9.2       Recent Labs     11/17/21  0910 11/17/21  0910 11/18/21  0038 11/18/21  0736 11/19/21  0812   PROT 7.2   < > 7.8 6.4 6.4   ALKPHOS 84   < > 94 78 73   ALT 21   < > 31 24 30   AST 34   < > 36 24 27   BILITOT 0.9   < > 0.8 0.5 0.4   LIPASE 29  --   --   --   --     < > = values in this interval not displayed. Recent Labs     11/17/21  0910   INR 1.4       No results for input(s): Julisa Benson in the last 72 hours. Chronic labs:    Lab Results   Component Value Date    CHOL 150 02/08/2021    TRIG 134 02/08/2021    HDL 54 02/08/2021    LDLCALC 69 02/08/2021    TSH 0.964 09/07/2018    INR 1.4 11/17/2021    LABA1C 6.4 (H) 02/08/2021       Radiology: REVIEWED DAILY    Assessment/plan    --Acute hypoxic respite failure secondary to COVID-19 infection. Decadron, barcitinib . Vitamin C D and zinc.  4 L O2 via nasal cannula    --Lactic acidosis, improving caution with IV fluids as patient has been awaiting BNP and being treated for COVID-19 as above    --Mild elevation in troponin with no chest pain. Trended down.   Likely truly due to acute illness    --Hypertension continue home meds    --Coronary artery disease, continue home meds    +++++++++++++++++++++++++++++++++++++++++++++++++  jose a Nogueira Physician - [address] [place] [address]  +++++++++++++++++++++++++++++++++++++++++++++++++  NOTE: This report was transcribed using voice recognition software. Every effort was made to ensure accuracy; however, inadvertent computerized transcription errors may be present.

## 2021-11-19 NOTE — CARE COORDINATION
Care Coordination: Spoke to pt in room via phone to discuss transition of care upon discharge. Lives alone in apt, seven steps to get in . No hx of dme or hhc. No preference if dme is needed, declines need for hhc. Completely independent pta. Sister may be able to  upon discharge. I have asked him to reach out for transportation. I also informed him, that he can private pay for transport home, offered [facility] which is approximately $108.00 upfront. He will make calls for transport home. Follows with Dr Earnstine Klinefelter and uses [facility] in [facility]. [employer].

## 2021-11-20 LAB
ALBUMIN SERPL-MCNC: 3.3 G/DL (ref 3.5–5.2)
ALP BLD-CCNC: 76 U/L (ref 40–129)
ALT SERPL-CCNC: 26 U/L (ref 0–40)
ANION GAP SERPL CALCULATED.3IONS-SCNC: 13 MMOL/L (ref 7–16)
AST SERPL-CCNC: 22 U/L (ref 0–39)
BASOPHILS ABSOLUTE: 0.02 E9/L (ref 0–0.2)
BASOPHILS RELATIVE PERCENT: 0.2 % (ref 0–2)
BILIRUB SERPL-MCNC: 0.4 MG/DL (ref 0–1.2)
BUN BLDV-MCNC: 32 MG/DL (ref 6–23)
CALCIUM SERPL-MCNC: 9.8 MG/DL (ref 8.6–10.2)
CHLORIDE BLD-SCNC: 105 MMOL/L (ref 98–107)
CO2: 22 MMOL/L (ref 22–29)
CREAT SERPL-MCNC: 1.1 MG/DL (ref 0.7–1.2)
EOSINOPHILS ABSOLUTE: 0 E9/L (ref 0.05–0.5)
EOSINOPHILS RELATIVE PERCENT: 0 % (ref 0–6)
GFR AFRICAN AMERICAN: >60
GFR NON-AFRICAN AMERICAN: >60 ML/MIN/1.73
GLUCOSE BLD-MCNC: 140 MG/DL (ref 74–99)
HCT VFR BLD CALC: 36.4 % (ref 37–54)
HEMOGLOBIN: 11.5 G/DL (ref 12.5–16.5)
IMMATURE GRANULOCYTES #: 0.15 E9/L
IMMATURE GRANULOCYTES %: 1.1 % (ref 0–5)
LYMPHOCYTES ABSOLUTE: 0.67 E9/L (ref 1.5–4)
LYMPHOCYTES RELATIVE PERCENT: 5.1 % (ref 20–42)
MCH RBC QN AUTO: 30.3 PG (ref 26–35)
MCHC RBC AUTO-ENTMCNC: 31.6 % (ref 32–34.5)
MCV RBC AUTO: 95.8 FL (ref 80–99.9)
MONOCYTES ABSOLUTE: 0.42 E9/L (ref 0.1–0.95)
MONOCYTES RELATIVE PERCENT: 3.2 % (ref 2–12)
NEUTROPHILS ABSOLUTE: 11.98 E9/L (ref 1.8–7.3)
NEUTROPHILS RELATIVE PERCENT: 90.4 % (ref 43–80)
PDW BLD-RTO: 12.4 FL (ref 11.5–15)
PLATELET # BLD: 426 E9/L (ref 130–450)
PMV BLD AUTO: 9.4 FL (ref 7–12)
POTASSIUM REFLEX MAGNESIUM: 4.9 MMOL/L (ref 3.5–5)
PROCALCITONIN: 0.05 NG/ML (ref 0–0.08)
RBC # BLD: 3.8 E12/L (ref 3.8–5.8)
SODIUM BLD-SCNC: 140 MMOL/L (ref 132–146)
TOTAL PROTEIN: 6.4 G/DL (ref 6.4–8.3)
WBC # BLD: 13.2 E9/L (ref 4.5–11.5)

## 2021-11-20 PROCEDURE — 36415 COLL VENOUS BLD VENIPUNCTURE: CPT

## 2021-11-20 PROCEDURE — 6370000000 HC RX 637 (ALT 250 FOR IP): Performed by: FAMILY MEDICINE

## 2021-11-20 PROCEDURE — 2140000000 HC CCU INTERMEDIATE R&B

## 2021-11-20 PROCEDURE — 85025 COMPLETE CBC W/AUTO DIFF WBC: CPT

## 2021-11-20 PROCEDURE — 2580000003 HC RX 258: Performed by: FAMILY MEDICINE

## 2021-11-20 PROCEDURE — 80053 COMPREHEN METABOLIC PANEL: CPT

## 2021-11-20 PROCEDURE — 99232 SBSQ HOSP IP/OBS MODERATE 35: CPT | Performed by: INTERNAL MEDICINE

## 2021-11-20 PROCEDURE — 6360000002 HC RX W HCPCS: Performed by: INTERNAL MEDICINE

## 2021-11-20 RX ADMIN — Medication 10 ML: at 10:31

## 2021-11-20 RX ADMIN — Medication 10 ML: at 21:51

## 2021-11-20 RX ADMIN — AMLODIPINE BESYLATE 10 MG: 10 TABLET ORAL at 10:30

## 2021-11-20 RX ADMIN — ASPIRIN 81 MG CHEWABLE TABLET 81 MG: 81 TABLET CHEWABLE at 10:30

## 2021-11-20 RX ADMIN — DEXAMETHASONE SODIUM PHOSPHATE 10 MG: 10 INJECTION, SOLUTION INTRAMUSCULAR; INTRAVENOUS at 10:31

## 2021-11-20 RX ADMIN — FAMOTIDINE 20 MG: 20 TABLET ORAL at 21:50

## 2021-11-20 RX ADMIN — ENOXAPARIN SODIUM 50 MG: 100 INJECTION SUBCUTANEOUS at 10:31

## 2021-11-20 RX ADMIN — OXYCODONE HYDROCHLORIDE AND ACETAMINOPHEN 500 MG: 500 TABLET ORAL at 10:30

## 2021-11-20 RX ADMIN — DEXAMETHASONE SODIUM PHOSPHATE 10 MG: 10 INJECTION, SOLUTION INTRAMUSCULAR; INTRAVENOUS at 21:50

## 2021-11-20 RX ADMIN — Medication 2000 UNITS: at 10:30

## 2021-11-20 RX ADMIN — ATORVASTATIN CALCIUM 40 MG: 40 TABLET, FILM COATED ORAL at 21:50

## 2021-11-20 RX ADMIN — FAMOTIDINE 20 MG: 20 TABLET ORAL at 10:30

## 2021-11-20 RX ADMIN — METOPROLOL SUCCINATE 50 MG: 50 TABLET, EXTENDED RELEASE ORAL at 10:30

## 2021-11-20 RX ADMIN — Medication 50 MG: at 10:30

## 2021-11-20 RX ADMIN — ISOSORBIDE MONONITRATE 30 MG: 30 TABLET, EXTENDED RELEASE ORAL at 10:30

## 2021-11-20 RX ADMIN — CLOPIDOGREL BISULFATE 75 MG: 75 TABLET ORAL at 10:30

## 2021-11-20 RX ADMIN — ENOXAPARIN SODIUM 50 MG: 100 INJECTION SUBCUTANEOUS at 21:50

## 2021-11-20 RX ADMIN — LISINOPRIL 20 MG: 20 TABLET ORAL at 21:52

## 2021-11-20 ASSESSMENT — PAIN SCALES - GENERAL: PAINLEVEL_OUTOF10: 0

## 2021-11-20 NOTE — PROGRESS NOTES
PULSE OX ON ROOM AIR SITTING 94%   PULSE OX ON ROOM AIR AMBULATING 89%   PULSE OX ON 2 LITERS SITTING RECOVERY 98%   PULSE OX ON 2 LITERS AMBULATING RECOVERY 97%    Jeremy Miller, RN

## 2021-11-20 NOTE — PROGRESS NOTES
Hospitalist Progress Note      SYNOPSIS: Patient admitted on 2021 for COVID-19    SUBJECTIVE:    Patient seen and examined  Records reviewed. Stable overnight. No other overnight issues reported. On 2-3L o2. shortness of breath and cough improving. No Chest pain abdominal pain nausea vomiting  Temp (24hrs), Av.1 °F (36.7 °C), Min:97.5 °F (36.4 °C), Max:98.8 °F (37.1 °C)    DIET: ADULT DIET; Regular  CODE: Full Code  No intake or output data in the 24 hours ending 21 1047    OBJECTIVE:    BP (!) 145/67   Pulse 69   Temp 98.1 °F (36.7 °C) (Oral)   Resp 18   Ht 5' 8\" (1.727 m)   Wt 221 lb (100.2 kg)   SpO2 94%   BMI 33.60 kg/m²     General appearance: No apparent distress, appears stated age and cooperative. HEENT:  Conjunctivae/corneas clear. Neck: Supple. No jugular venous distention. Respiratory: Mild coarseness at bases to auscultation bilaterally, normal respiratory effort  Cardiovascular: Regular rate rhythm, normal S1-S2  Abdomen: Soft, nontender, nondistended  Musculoskeletal: No clubbing, cyanosis, no bilateral lower extremity edema. Brisk capillary refill. Skin:  No rashes  on visible skin  Neurologic: awake, alert and following commands         ASSESSMENT:  -COVID-19 pneumonia  -Acute hypoxic respiratory failure  -Lactic acidosis likely from dehydration  -Hypertension  -Coronary artery disease     Plan  -continue dexamethasone, ,oxygen support wean as tolerated. inhalors Incentive spirometer. DISPOSITION: lives home alone. need home health on dc.     Medications:  REVIEWED DAILY    Infusion Medications    sodium chloride       Scheduled Medications    budesonide  1 mg Nebulization BID    amLODIPine  10 mg Oral Daily    aspirin  81 mg Oral Daily    atorvastatin  40 mg Oral Daily    clopidogrel  75 mg Oral Daily    famotidine  20 mg Oral BID    isosorbide mononitrate  30 mg Oral Daily    lisinopril  20 mg Oral Daily    metoprolol succinate  50 mg Oral BID  sodium chloride flush  5-40 mL IntraVENous 2 times per day    ascorbic acid  500 mg Oral Daily    zinc sulfate  50 mg Oral Daily    vitamin D  2,000 Units Oral Daily    dexamethasone  10 mg IntraVENous Q12H    enoxaparin  50 mg SubCUTAneous BID     PRN Meds: sodium chloride flush, sodium chloride, ondansetron **OR** ondansetron, polyethylene glycol, acetaminophen **OR** acetaminophen, albuterol, guaiFENesin-dextromethorphan    Labs:     Recent Labs     11/18/21 0736 11/19/21  0812 11/20/21  0532   WBC 10.4 13.2* 13.2*   HGB 12.1* 11.5* 11.5*   HCT 35.9* 34.6* 36.4*    415 426       Recent Labs     11/18/21 0736 11/19/21  0812 11/20/21  0532    137 140   K 4.3 4.0 4.9    104 105   CO2 20* 20* 22   BUN 17 27* 32*   CREATININE 1.0 1.1 1.1   CALCIUM 9.2 9.2 9.8       Recent Labs     11/18/21 0736 11/19/21  0812 11/20/21  0532   PROT 6.4 6.4 6.4   ALKPHOS 78 73 76   ALT 24 30 26   AST 24 27 22   BILITOT 0.5 0.4 0.4       No results for input(s): INR in the last 72 hours. No results for input(s): Leia Selene in the last 72 hours. Chronic labs:    Lab Results   Component Value Date    CHOL 150 02/08/2021    TRIG 134 02/08/2021    HDL 54 02/08/2021    LDLCALC 69 02/08/2021    TSH 0.964 09/07/2018    INR 1.4 11/17/2021    LABA1C 6.4 (H) 02/08/2021       Radiology: REVIEWED DAILY    Assessment/plan    --Acute hypoxic respite failure secondary to COVID-19 infection. Decadron, barcitinib . Vitamin C D and zinc.  4 L O2 via nasal cannula    --Lactic acidosis, improving caution with IV fluids as patient has been awaiting BNP and being treated for COVID-19 as above    --Mild elevation in troponin with no chest pain. Trended down.   Likely truly due to acute illness    --Hypertension continue home meds    --Coronary artery disease, continue home meds    +++++++++++++++++++++++++++++++++++++++++++++++++  jose a Nogueira Physician - [place]  [address] [place], [place]  +++++++++++++++++++++++++++++++++++++++++++++++++  NOTE: This report was transcribed using voice recognition software. Every effort was made to ensure accuracy; however, inadvertent computerized transcription errors may be present.

## 2021-11-20 NOTE — CONSULTS
Pulmonary [facility]                             Pulmonary Consult/Progress Note :                  Reason for Consultation:COVID pneumonia   CC : SOB  HPI:   Doing much better  On 3 L  Denies any CP  No fever  Able to take deep breath      PHYSICAL EXAMINATION:     VITAL SIGNS:  /68   Pulse 58   Temp 97 °F (36.1 °C) (Temporal)   Resp 18   Ht 5' 8\" (1.727 m)   Wt 221 lb (100.2 kg)   SpO2 97%   BMI 33.60 kg/m²   Wt Readings from Last 3 Encounters:   11/17/21 221 lb (100.2 kg)   10/19/21 220 lb (99.8 kg)   07/20/21 233 lb (105.7 kg)     Temp Readings from Last 3 Encounters:   11/19/21 97 °F (36.1 °C) (Temporal)   10/19/21 97.3 °F (36.3 °C)   07/20/21 97.3 °F (36.3 °C) (Temporal)     TMAX:  BP Readings from Last 3 Encounters:   11/19/21 128/68   10/19/21 (!) 149/86   07/20/21 126/83     Pulse Readings from Last 3 Encounters:   11/19/21 58   10/19/21 61   07/20/21 56           INTAKE/OUTPUTS:  I/O last 3 completed shifts:  In: -   Out: 100 [Urine:100]    Intake/Output Summary (Last 24 hours) at 11/19/2021 2219  Last data filed at 11/19/2021 0258  Gross per 24 hour   Intake --   Output 100 ml   Net -100 ml       General Appearance: alert and oriented to person, place and time, well-developed and   well-nourished, in no acute distress   Eyes: pupils equal, round, and reactive to light, extraocular eye movements intact, conjunctivae normal and sclera anicteric   Neck: neck supple and non tender without mass, no thyromegaly, no thyroid nodules and no cervical adenopathy   Pulmonary/Chest:scattred rhonchi   Cardiovascular: normal rate, regular rhythm, normal S1 and S2, no murmurs, rubs, clicks or gallops, distal pulses intact, no carotid bruits, no murmurs, no gallops, no carotid bruits and no JVD   Abdomen: obese, soft, non-tender, non-distended, normal bowel sounds, no masses or organomegaly   Extremities:No edema or cynossis   Musculoskeletal: normal range of motion, no joint swelling, deformity or tenderness   Neurologic: reflexes normal and symmetric, no cranial nerve deficit noted    LABS/IMAGING:    CBC:  Lab Results   Component Value Date    WBC 13.2 (H) 11/19/2021    HGB 11.5 (L) 11/19/2021    HCT 34.6 (L) 11/19/2021    MCV 91.8 11/19/2021     11/19/2021    LYMPHOPCT 6.7 (L) 11/19/2021    RBC 3.77 (L) 11/19/2021    MCH 30.5 11/19/2021    MCHC 33.2 11/19/2021    RDW 12.4 11/19/2021    NEUTOPHILPCT 88.9 (H) 11/19/2021    MONOPCT 3.4 11/19/2021    BASOPCT 0.2 11/19/2021    NEUTROABS 11.73 (H) 11/19/2021    LYMPHSABS 0.89 (L) 11/19/2021    MONOSABS 0.45 11/19/2021    EOSABS 0.00 (L) 11/19/2021    BASOSABS 0.02 11/19/2021       Recent Labs     11/19/21  0812 11/18/21  0736 11/18/21  0038   WBC 13.2* 10.4 8.0   HGB 11.5* 12.1* 13.4   HCT 34.6* 35.9* 39.9   MCV 91.8 90.4 93.4    338 406       BMP:   Recent Labs     11/18/21  0038 11/18/21  0736 11/19/21  0812   * 138 137   K 3.8 4.3 4.0   CL 94* 104 104   CO2 18* 20* 20*   BUN 18 17 27*   CREATININE 1.2 1.0 1.1       MG:   Lab Results   Component Value Date    MG 2.2 11/18/2021     Ca/Phos:   Lab Results   Component Value Date    CALCIUM 9.2 11/19/2021    PHOS 2.9 07/26/2021     Amylase: No results found for: AMYLASE  Lipase:   Lab Results   Component Value Date    LIPASE 29 11/17/2021     LIVER PROFILE:   Recent Labs     11/17/21  0910 11/17/21  0910 11/18/21  0038 11/18/21  0736 11/19/21  0812   AST 34   < > 36 24 27   ALT 21   < > 31 24 30   LIPASE 29  --   --   --   --    BILITOT 0.9   < > 0.8 0.5 0.4   ALKPHOS 84   < > 94 78 73    < > = values in this interval not displayed. PT/INR:   Recent Labs     11/17/21  0910   PROTIME 15.4*   INR 1.4     APTT: No results for input(s): APTT in the last 72 hours.     Cardiac Enzymes:  Lab Results   Component Value Date    TROPONINI <0.01 10/23/2018               CT Bl GGO     PROBLEM LIST:  Patient Active Problem List   Diagnosis    STEMI (ST elevation myocardial infarction) ([address]    Essential hypertension    Coronary artery disease involving native coronary artery of native heart without angina pectoris    CKD (chronic kidney disease), stage III (HCC)    PAD (peripheral artery disease) (HCC)    Neuropathy of both feet    Basal cell carcinoma (BCC) of skin of nose    Pneumonia due to COVID-19 virus               ASSESSMENT:  1.) COVID Pneumonia  2. )Acute hypoxic respiratory failure   3.)r/o Bacterial pneumonia         PLAN:  *-*- Wean O2 as tolerated   *-  Decadron     *-giving the diffuse infiltrate in the chest and since inflammatory markers went down and only on 3 L ,will old Tocilizumab ,his CRP down now ,so can just continue with steroids   *-Follow-up chest x-ray as needed  *-Avoid fluid overload   If hypoxia worse will Start OptiFlow soon and BiPAP as needed   Aggressive pulmonary toilet  Albuterol as needed  High dose steroids  0.5 mg Lovenox bid ,since high D dimer but no PE    Will see how he does next 1-2 days       Vinny Lowe MD,FCCP  Pulmonary&Critical Care Medicine   Director of [address] [address] Director of [address] [place] [address]    [facility]    NOTE: This report was transcribed using voice recognition software. Every effort was made to ensure accuracy; however, inadvertent computerized transcription errors may be present.

## 2021-11-20 NOTE — CONSULTS
Pulmonary [facility]                             Pulmonary Consult/Progress Note :                  Reason for Consultation:COVID pneumonia   CC : SOB  HPI:   Doing much better and on RA   Denies any CP  No fever  Able to take deep breath  ambulated and sat above 89      PHYSICAL EXAMINATION:     VITAL SIGNS:  BP (!) 145/67   Pulse 69   Temp 98.1 °F (36.7 °C) (Oral)   Resp 18   Ht 5' 8\" (1.727 m)   Wt 221 lb (100.2 kg)   SpO2 94%   BMI 33.60 kg/m²   Wt Readings from Last 3 Encounters:   11/17/21 221 lb (100.2 kg)   10/19/21 220 lb (99.8 kg)   07/20/21 233 lb (105.7 kg)     Temp Readings from Last 3 Encounters:   11/20/21 98.1 °F (36.7 °C) (Oral)   10/19/21 97.3 °F (36.3 °C)   07/20/21 97.3 °F (36.3 °C) (Temporal)     TMAX:  BP Readings from Last 3 Encounters:   11/20/21 (!) 145/67   10/19/21 (!) 149/86   07/20/21 126/83     Pulse Readings from Last 3 Encounters:   11/20/21 69   10/19/21 61   07/20/21 56           INTAKE/OUTPUTS:  No intake/output data recorded.   No intake or output data in the 24 hours ending 11/20/21 1651    General Appearance: alert and oriented to person, place and time, well-developed and   well-nourished, in no acute distress   Eyes: pupils equal, round, and reactive to light, extraocular eye movements intact, conjunctivae normal and sclera anicteric   Neck: neck supple and non tender without mass, no thyromegaly, no thyroid nodules and no cervical adenopathy   Pulmonary/Chest:scattred rhonchi   Cardiovascular: normal rate, regular rhythm, normal S1 and S2, no murmurs, rubs, clicks or gallops, distal pulses intact, no carotid bruits, no murmurs, no gallops, no carotid bruits and no JVD   Abdomen: obese, soft, non-tender, non-distended, normal bowel sounds, no masses or organomegaly   Extremities:No edema or cynossis   Musculoskeletal: normal range of motion, no joint swelling, deformity or tenderness   Neurologic: reflexes normal and symmetric, no cranial nerve deficit noted    LABS/IMAGING:    CBC:  Lab Results   Component Value Date    WBC 13.2 (H) 11/20/2021    HGB 11.5 (L) 11/20/2021    HCT 36.4 (L) 11/20/2021    MCV 95.8 11/20/2021     11/20/2021    LYMPHOPCT 5.1 (L) 11/20/2021    RBC 3.80 11/20/2021    MCH 30.3 11/20/2021    MCHC 31.6 (L) 11/20/2021    RDW 12.4 11/20/2021    NEUTOPHILPCT 90.4 (H) 11/20/2021    MONOPCT 3.2 11/20/2021    BASOPCT 0.2 11/20/2021    NEUTROABS 11.98 (H) 11/20/2021    LYMPHSABS 0.67 (L) 11/20/2021    MONOSABS 0.42 11/20/2021    EOSABS 0.00 (L) 11/20/2021    BASOSABS 0.02 11/20/2021       Recent Labs     11/20/21  0532 11/19/21  0812 11/18/21  0736   WBC 13.2* 13.2* 10.4   HGB 11.5* 11.5* 12.1*   HCT 36.4* 34.6* 35.9*   MCV 95.8 91.8 90.4    415 338       BMP:   Recent Labs     11/18/21  0736 11/19/21  0812 11/20/21  0532    137 140   K 4.3 4.0 4.9    104 105   CO2 20* 20* 22   BUN 17 27* 32*   CREATININE 1.0 1.1 1.1       MG:   Lab Results   Component Value Date    MG 2.2 11/18/2021     Ca/Phos:   Lab Results   Component Value Date    CALCIUM 9.8 11/20/2021    PHOS 2.9 07/26/2021     Amylase: No results found for: AMYLASE  Lipase:   Lab Results   Component Value Date    LIPASE 29 11/17/2021     LIVER PROFILE:   Recent Labs     11/18/21  0736 11/19/21  0812 11/20/21  0532   AST 24 27 22   ALT 24 30 26   BILITOT 0.5 0.4 0.4   ALKPHOS 78 73 76       PT/INR:   No results for input(s): PROTIME, INR in the last 72 hours. APTT: No results for input(s): APTT in the last 72 hours.     Cardiac Enzymes:  Lab Results   Component Value Date    TROPONINI <0.01 10/23/2018               CT Bl GGO     PROBLEM LIST:  Patient Active Problem List   Diagnosis    [facility] elevation [facility] infarction) [facility] [address]    Essential hypertension    Coronary artery disease involving native coronary artery of native heart without angina pectoris    CKD (chronic kidney disease), stage III (HCC)    [facility]peripheral artery disease) [facility] [address]    Neuropathy of both feet    Basal cell carcinoma (BCC) of skin of nose    Pneumonia due to COVID-19 virus               ASSESSMENT:  1.) COVID Pneumonia  2. )Acute hypoxic respiratory failure   3.)r/o Bacterial pneumonia         PLAN:  *-Wean O2 as tolerated   *-  Decadron 6 mg to finish      No need for Toci    *-Follow-up chest x-ray as needed  *-Avoid fluid overload   t  Albuterol as needed  High dose steroids  0.5 mg Lovenox bid ,since high D dimer but no PE,doubt that high D dimer correct ,so go back Lovenox 40 bid     Ok for discharge     [address]  Pulmonary&Critical Care Medicine   Director of [address] [place] [address] Director of [address] [place] [address]    Karla Hall    NOTE: This report was transcribed using voice recognition software. Every effort was made to ensure accuracy; however, inadvertent computerized transcription errors may be present.

## 2021-11-21 VITALS
TEMPERATURE: 97.9 F | DIASTOLIC BLOOD PRESSURE: 70 MMHG | OXYGEN SATURATION: 92 % | RESPIRATION RATE: 18 BRPM | HEART RATE: 77 BPM | BODY MASS INDEX: 33.49 KG/M2 | SYSTOLIC BLOOD PRESSURE: 128 MMHG | WEIGHT: 221 LBS | HEIGHT: 68 IN

## 2021-11-21 LAB
ALBUMIN SERPL-MCNC: 3.4 G/DL (ref 3.5–5.2)
ALP BLD-CCNC: 89 U/L (ref 40–129)
ALT SERPL-CCNC: 25 U/L (ref 0–40)
ANION GAP SERPL CALCULATED.3IONS-SCNC: 13 MMOL/L (ref 7–16)
AST SERPL-CCNC: 21 U/L (ref 0–39)
BASOPHILS ABSOLUTE: 0.01 E9/L (ref 0–0.2)
BASOPHILS RELATIVE PERCENT: 0.1 % (ref 0–2)
BILIRUB SERPL-MCNC: 0.4 MG/DL (ref 0–1.2)
BUN BLDV-MCNC: 37 MG/DL (ref 6–23)
CALCIUM SERPL-MCNC: 9.5 MG/DL (ref 8.6–10.2)
CHLORIDE BLD-SCNC: 103 MMOL/L (ref 98–107)
CO2: 22 MMOL/L (ref 22–29)
CREAT SERPL-MCNC: 1.1 MG/DL (ref 0.7–1.2)
EOSINOPHILS ABSOLUTE: 0 E9/L (ref 0.05–0.5)
EOSINOPHILS RELATIVE PERCENT: 0 % (ref 0–6)
GFR AFRICAN AMERICAN: >60
GFR NON-AFRICAN AMERICAN: >60 ML/MIN/1.73
GLUCOSE BLD-MCNC: 141 MG/DL (ref 74–99)
HCT VFR BLD CALC: 35.7 % (ref 37–54)
HEMOGLOBIN: 12.4 G/DL (ref 12.5–16.5)
IMMATURE GRANULOCYTES #: 0.17 E9/L
IMMATURE GRANULOCYTES %: 1.3 % (ref 0–5)
LYMPHOCYTES ABSOLUTE: 0.79 E9/L (ref 1.5–4)
LYMPHOCYTES RELATIVE PERCENT: 6.2 % (ref 20–42)
MCH RBC QN AUTO: 33 PG (ref 26–35)
MCHC RBC AUTO-ENTMCNC: 34.7 % (ref 32–34.5)
MCV RBC AUTO: 94.9 FL (ref 80–99.9)
MONOCYTES ABSOLUTE: 0.82 E9/L (ref 0.1–0.95)
MONOCYTES RELATIVE PERCENT: 6.5 % (ref 2–12)
NEUTROPHILS ABSOLUTE: 10.91 E9/L (ref 1.8–7.3)
NEUTROPHILS RELATIVE PERCENT: 85.9 % (ref 43–80)
PDW BLD-RTO: 12.5 FL (ref 11.5–15)
PLATELET # BLD: 515 E9/L (ref 130–450)
PMV BLD AUTO: 9.3 FL (ref 7–12)
POTASSIUM REFLEX MAGNESIUM: 4.6 MMOL/L (ref 3.5–5)
RBC # BLD: 3.76 E12/L (ref 3.8–5.8)
SODIUM BLD-SCNC: 138 MMOL/L (ref 132–146)
TOTAL PROTEIN: 6.2 G/DL (ref 6.4–8.3)
WBC # BLD: 12.7 E9/L (ref 4.5–11.5)

## 2021-11-21 PROCEDURE — 85025 COMPLETE CBC W/AUTO DIFF WBC: CPT

## 2021-11-21 PROCEDURE — 36415 COLL VENOUS BLD VENIPUNCTURE: CPT

## 2021-11-21 PROCEDURE — 2580000003 HC RX 258: Performed by: FAMILY MEDICINE

## 2021-11-21 PROCEDURE — 6370000000 HC RX 637 (ALT 250 FOR IP): Performed by: FAMILY MEDICINE

## 2021-11-21 PROCEDURE — 6360000002 HC RX W HCPCS: Performed by: INTERNAL MEDICINE

## 2021-11-21 PROCEDURE — 94640 AIRWAY INHALATION TREATMENT: CPT

## 2021-11-21 PROCEDURE — 80053 COMPREHEN METABOLIC PANEL: CPT

## 2021-11-21 PROCEDURE — 99232 SBSQ HOSP IP/OBS MODERATE 35: CPT | Performed by: INTERNAL MEDICINE

## 2021-11-21 RX ORDER — PREDNISONE 20 MG/1
20 TABLET ORAL DAILY
Qty: 10 TABLET | Refills: 0 | Status: SHIPPED | OUTPATIENT
Start: 2021-11-21 | End: 2021-11-26

## 2021-11-21 RX ADMIN — ISOSORBIDE MONONITRATE 30 MG: 30 TABLET, EXTENDED RELEASE ORAL at 09:54

## 2021-11-21 RX ADMIN — BUDESONIDE 1000 MCG: 0.25 SUSPENSION RESPIRATORY (INHALATION) at 09:13

## 2021-11-21 RX ADMIN — CLOPIDOGREL BISULFATE 75 MG: 75 TABLET ORAL at 09:55

## 2021-11-21 RX ADMIN — AMLODIPINE BESYLATE 10 MG: 10 TABLET ORAL at 09:54

## 2021-11-21 RX ADMIN — OXYCODONE HYDROCHLORIDE AND ACETAMINOPHEN 500 MG: 500 TABLET ORAL at 09:54

## 2021-11-21 RX ADMIN — METOPROLOL SUCCINATE 50 MG: 50 TABLET, EXTENDED RELEASE ORAL at 09:54

## 2021-11-21 RX ADMIN — Medication 50 MG: at 09:54

## 2021-11-21 RX ADMIN — FAMOTIDINE 20 MG: 20 TABLET ORAL at 09:54

## 2021-11-21 RX ADMIN — DEXAMETHASONE SODIUM PHOSPHATE 10 MG: 10 INJECTION, SOLUTION INTRAMUSCULAR; INTRAVENOUS at 09:55

## 2021-11-21 RX ADMIN — Medication 2000 UNITS: at 09:55

## 2021-11-21 RX ADMIN — ENOXAPARIN SODIUM 50 MG: 100 INJECTION SUBCUTANEOUS at 09:51

## 2021-11-21 RX ADMIN — ASPIRIN 81 MG CHEWABLE TABLET 81 MG: 81 TABLET CHEWABLE at 09:54

## 2021-11-21 RX ADMIN — Medication 10 ML: at 09:50

## 2021-11-21 ASSESSMENT — PAIN SCALES - GENERAL: PAINLEVEL_OUTOF10: 0

## 2021-11-21 NOTE — PLAN OF CARE
Problem: Airway Clearance - Ineffective  Goal: Achieve or maintain patent airway  Outcome: Met This Shift     Problem: Gas Exchange - Impaired  Goal: Absence of hypoxia  Outcome: Met This Shift  Goal: Promote optimal lung function  Outcome: Met This Shift     Problem: Breathing Pattern - Ineffective  Goal: Ability to achieve and maintain a regular respiratory rate  Outcome: Met This Shift     Problem:  Body Temperature -  Risk of, Imbalanced  Goal: Ability to maintain a body temperature within defined limits  Outcome: Met This Shift  Goal: Will regain or maintain usual level of consciousness  Outcome: Met This Shift  Goal: Complications related to the disease process, condition or treatment will be avoided or minimized  Outcome: Met This Shift     Problem: Isolation Precautions - Risk of Spread of Infection  Goal: Prevent transmission of infection  Outcome: Met This Shift     Problem: Skin Integrity:  Goal: Will show no infection signs and symptoms  Description: Will show no infection signs and symptoms  Outcome: Met This Shift  Goal: Absence of new skin breakdown  Description: Absence of new skin breakdown  Outcome: Met This Shift

## 2021-11-21 NOTE — PROGRESS NOTES
Pulmonary [facility]                             Pulmonary Consult/Progress Note :                  Reason for Consultation:COVID pneumonia   CC : SOB  HPI:   Doing much better and on RA   Denies any CP  No fever  Able to take deep breath  ambulated and sat above 92      PHYSICAL EXAMINATION:     VITAL SIGNS:  /70   Pulse 77   Temp 97.9 °F (36.6 °C) (Oral)   Resp 18   Ht 5' 8\" (1.727 m)   Wt 221 lb (100.2 kg)   SpO2 92%   BMI 33.60 kg/m²   Wt Readings from Last 3 Encounters:   11/17/21 221 lb (100.2 kg)   10/19/21 220 lb (99.8 kg)   07/20/21 233 lb (105.7 kg)     Temp Readings from Last 3 Encounters:   11/21/21 97.9 °F (36.6 °C) (Oral)   10/19/21 97.3 °F (36.3 °C)   07/20/21 97.3 °F (36.3 °C) (Temporal)     TMAX:  BP Readings from Last 3 Encounters:   11/21/21 128/70   10/19/21 (!) 149/86   07/20/21 126/83     Pulse Readings from Last 3 Encounters:   11/21/21 77   10/19/21 61   07/20/21 56           INTAKE/OUTPUTS:  No intake/output data recorded.   No intake or output data in the 24 hours ending 11/21/21 1221    General Appearance: alert and oriented to person, place and time, well-developed and   well-nourished, in no acute distress   Eyes: pupils equal, round, and reactive to light, extraocular eye movements intact, conjunctivae normal and sclera anicteric   Neck: neck supple and non tender without mass, no thyromegaly, no thyroid nodules and no cervical adenopathy   Pulmonary/Chest:scattred rhonchi   Cardiovascular: normal rate, regular rhythm, normal S1 and S2, no murmurs, rubs, clicks or gallops, distal pulses intact, no carotid bruits, no murmurs, no gallops, no carotid bruits and no JVD   Abdomen: obese, soft, non-tender, non-distended, normal bowel sounds, no masses or organomegaly   Extremities:No edema or cynossis   Musculoskeletal: normal range of motion, no joint swelling, deformity or tenderness   Neurologic: reflexes normal and symmetric, no cranial nerve deficit noted    LABS/IMAGING:    CBC:  Lab Results   Component Value Date    WBC 12.7 (H) 11/21/2021    HGB 12.4 (L) 11/21/2021    HCT 35.7 (L) 11/21/2021    MCV 94.9 11/21/2021     (H) 11/21/2021    LYMPHOPCT 6.2 (L) 11/21/2021    RBC 3.76 (L) 11/21/2021    MCH 33.0 11/21/2021    MCHC 34.7 (H) 11/21/2021    RDW 12.5 11/21/2021    NEUTOPHILPCT 85.9 (H) 11/21/2021    MONOPCT 6.5 11/21/2021    BASOPCT 0.1 11/21/2021    NEUTROABS 10.91 (H) 11/21/2021    LYMPHSABS 0.79 (L) 11/21/2021    MONOSABS 0.82 11/21/2021    EOSABS 0.00 (L) 11/21/2021    BASOSABS 0.01 11/21/2021       Recent Labs     11/21/21  1048 11/20/21  0532 11/19/21  0812   WBC 12.7* 13.2* 13.2*   HGB 12.4* 11.5* 11.5*   HCT 35.7* 36.4* 34.6*   MCV 94.9 95.8 91.8   * 426 415       BMP:   Recent Labs     11/19/21  0812 11/20/21  0532    140   K 4.0 4.9    105   CO2 20* 22   BUN 27* 32*   CREATININE 1.1 1.1       MG:   Lab Results   Component Value Date    MG 2.2 11/18/2021     Ca/Phos:   Lab Results   Component Value Date    CALCIUM 9.8 11/20/2021    PHOS 2.9 07/26/2021     Amylase: No results found for: AMYLASE  Lipase:   Lab Results   Component Value Date    LIPASE 29 11/17/2021     LIVER PROFILE:   Recent Labs     11/19/21  0812 11/20/21  0532   AST 27 22   ALT 30 26   BILITOT 0.4 0.4   ALKPHOS 73 76       PT/INR:   No results for input(s): PROTIME, INR in the last 72 hours. APTT: No results for input(s): APTT in the last 72 hours.     Cardiac Enzymes:  Lab Results   Component Value Date    TROPONINI <0.01 10/23/2018               CT Bl GGO     PROBLEM LIST:  Patient Active Problem List   Diagnosis    [facility] ([facility] elevation [facility] infarction) ([facility] [address]    Essential hypertension    Coronary artery disease involving native coronary artery of native heart without angina pectoris    CKD (chronic kidney disease), stage III ([facility])    PAD (peripheral artery disease) ([facility])    Neuropathy of both feet    Basal cell carcinoma (BCC) of skin of nose    Pneumonia due to COVID-19 virus               ASSESSMENT:  1.) COVID Pneumonia  2. )Acute hypoxic respiratory failure   3.)r/o Bacterial pneumonia         PLAN:  *-Wean O2 as tolerated   *-  Decadron 6 mg to finish      No need for Toci    *-Follow-up chest x-ray as needed  *-Avoid fluid overload   tAlbuterol as needed  High dose steroids    Ok for discharge   Follow as OP,provided my contact  [place]  Pulmonary&Critical Care Medicine   Director of [address] [place] [address] Director of [address] [place] [address]    Jeff Hernandez    NOTE: This report was transcribed using voice recognition software. Every effort was made to ensure accuracy; however, inadvertent computerized transcription errors may be present.

## 2021-11-21 NOTE — DISCHARGE SUMMARY
Hospitalist Discharge Summary    Patient ID: Carey Slade   Patient : 1950  Patient's PCP: Marilu Sanderson MD    Admit Date: 2021   Admitting Physician: Pamela Winters MD    Discharge Date:  2021  Discharge Physician: Yareli Ortiz DO   Discharge Condition: Stable  Discharge Disposition: Home    History of presenting illness:    presented to outside emergency department with complaints of fatigue. This been going on for about 2 weeks. He also endorses lightheadedness, decreased appetite and cough. Cough is nonproductive. He denies fever, chills, chest pain, nausea, vomiting, abdominal pain. The patient did receive both his Covid vaccinations. Patient noted to drop to 83% while ambulating. Laboratory studies notable for sodium 130, lactic acid 2.7, glucose 127, proBNP 959, troponin 19. COVID-19 positive. CT chest shows multifocal bilateral groundglass and semisolid pulmonary infiltrates most consistent with Covid pneumonia. Patient was then [facility] to [facility] treatment.     -COVID-19 pneumonia  -Acute hypoxic respiratory failure  -Lactic acidosis  -Elevated troponin  -Hypertension  -Coronary artery disease    Admitted on 3 loters o2 nc   Positive steroids and toci   o2 titrated to room air by     Planned dc home  Continue with home isolation    Follow up with pcp in 10 days    Repeat chest xray in 4 weeks for re-eval    Heart reg rate  Lungs cta bilat  Ext no edema    Dc plan and instructions 38 min    Consults:   IP CONSULT TO INTERNAL MEDICINE  IP CONSULT TO PHARMACY  IP CONSULT TO PULMONOLOGY    Discharge Diagnoses:     -COVID-19 pneumonia  -Acute hypoxic respiratory failure  -Lactic acidosis  -Elevated troponin  -Hypertension  -Coronary artery disease    Discharge Instructions / Follow up:    Continued appropriate risk factor modification of blood pressure, diabetes and serum lipids will remain essential to reducing risk of future atherosclerotic development    Activity: activity as tolerated    Significant labs:  CBC:   Recent Labs     11/19/21  0812 11/20/21  0532   WBC 13.2* 13.2*   RBC 3.77* 3.80   HGB 11.5* 11.5*   HCT 34.6* 36.4*   MCV 91.8 95.8   RDW 12.4 12.4    426     BMP:   Recent Labs     11/19/21  0812 11/20/21  0532    140   K 4.0 4.9    105   CO2 20* 22   BUN 27* 32*   CREATININE 1.1 1.1     LFT:  Recent Labs     11/19/21  0812 11/20/21  0532   PROT 6.4 6.4   ALKPHOS 73 76   ALT 30 26   AST 27 22   BILITOT 0.4 0.4     PT/INR: No results for input(s): INR, APTT in the last 72 hours. BNP: No results for input(s): BNP in the last 72 hours. Hgb A1C:   Lab Results   Component Value Date    LABA1C 6.4 (H) 02/08/2021     Folate and B12: No results found for: Norm Nilo, No results found for: FOLATE  Thyroid Studies:   Lab Results   Component Value Date    TSH 0.964 09/07/2018       Urinalysis:  No results found for: NITRU, WBCUA, BACTERIA, RBCUA, BLOODU, SPECGRAV, GLUCOSEU    Imaging:  XR CHEST PORTABLE    Result Date: 11/17/2021  EXAMINATION: ONE XRAY VIEW OF THE CHEST 11/17/2021 8:51 am COMPARISON: None. HISTORY: ORDERING SYSTEM PROVIDED HISTORY: SOB TECHNOLOGIST PROVIDED HISTORY: Reason for exam:->SOB FINDINGS: The cardiac silhouette is mildly enlarged. No findings of failure. Patchy bilateral peripheral pulmonary infiltrates are noted consistent with bilateral multifocal pneumonia. There is no right or left pleural effusion. 1. Peripheral patchy multifocal bilateral pneumonia. CTA PULMONARY W CONTRAST    Result Date: 11/17/2021  EXAMINATION: CTA OF THE CHEST 11/17/2021 12:02 pm TECHNIQUE: CTA of the chest was performed after the administration of intravenous contrast.  Multiplanar reformatted images are provided for review. MIP images are provided for review.  Dose modulation, iterative reconstruction, and/or weight based adjustment of the mA/kV was utilized to reduce the radiation dose to as low as reasonably achievable. COMPARISON: None. HISTORY: ORDERING SYSTEM PROVIDED HISTORY: SOB, hypoxic TECHNOLOGIST PROVIDED HISTORY: Reason for exam:->SOB, hypoxic Decision Support Exception - unselect if not a suspected or confirmed emergency medical condition->Emergency Medical Condition (MA) FINDINGS: Pulmonary Arteries: Pulmonary arteries are adequately opacified for evaluation. No evidence of intraluminal filling defect to suggest pulmonary embolism. Main pulmonary artery is normal in caliber. Mediastinum: No evidence of mediastinal lymphadenopathy. The heart and pericardium demonstrate no acute abnormality. There is no acute abnormality of the thoracic aorta. Lungs/pleura: There are bilateral multifocal ground-glass and semi solid pulmonary infiltrates most consistent with COVID pneumonia. Upper Abdomen: There is a moderate to large hiatal hernia. Soft Tissues/Bones: There is significant kyphosis of the thoracic spine     1. There is no evidence of a pulmonary embolus 2. Multifocal bilateral ground-glass and semi solid pulmonary infiltrates most consistent with COVID pneumonia. 3. Significant kyphosis of the thoracic spine. There is no compression fracture.        Discharge Medications:      Medication List      CHANGE how you take these medications    aspirin 81 MG chewable tablet  Take 1 tablet by mouth daily  What changed: additional instructions     predniSONE 20 MG tablet  Commonly known as: DELTASONE  Take 1 tablet by mouth daily for 5 days  What changed:   · medication strength  · how much to take  · how to take this  · when to take this  · additional instructions        CONTINUE taking these medications    amLODIPine 10 MG tablet  Commonly known as: NORVASC  Take 1 tablet by mouth daily     atorvastatin 40 MG tablet  Commonly known as: LIPITOR  Take 1 tablet by mouth daily     clopidogrel 75 MG tablet  Commonly known as: Plavix  Take 1 tablet by mouth daily     famotidine 20 MG tablet  Commonly known as: PEPCID  Take 1 tablet by mouth 2 times daily     isosorbide mononitrate 30 MG extended release tablet  Commonly known as: IMDUR  Take 1 tablet by mouth daily     lisinopril 20 MG tablet  Commonly known as: PRINIVIL;ZESTRIL  Take 1 tablet by mouth daily     metoprolol succinate 50 MG extended release tablet  Commonly known as: TOPROL XL  Take 1 tablet by mouth 2 times daily Take am dos 11/16     nitroGLYCERIN 0.4 MG SL tablet  Commonly known as: Nitrostat  Place 1 tablet under the tongue every 5 minutes as needed for Chest pain     omeprazole 40 MG delayed release capsule  Commonly known as: PRILOSEC  Take 1 capsule by mouth 2 times daily     Vitamin D3 50 MCG (2000 UT) Caps           Where to Get Your Medications      These medications were sent to [facility] DR ROBERT GORMAN [address] [place] [address], [address] [place] [address]  [address] [place] [address]    Phone: [phone]   · predniSONE 20 MG tablet         Time Spent on discharge is more than 35 minutes in the examination, evaluation, counseling and review of medications and discharge plan.    +++++++++++++++++++++++++++++++++++++++++++++++++    [employer] - [address] [place], [place]  +++++++++++++++++++++++++++++++++++++++++++++++++  NOTE: This report was transcribed using voice recognition software. Every effort was made to ensure accuracy; however, inadvertent computerized transcription errors may be present.

## 2021-11-22 ENCOUNTER — CARE COORDINATION (OUTPATIENT)
Dept: CASE MANAGEMENT | Age: 71
End: 2021-11-22

## 2021-11-22 NOTE — CARE COORDINATION
[facility] Transitions Follow Up Call    2021    Patient: Marcello Hawley  Patient : 1950   MRN: [id number]  Reason for Admission: acute respiratory failure  Discharge Date: 21 RARS: Readmission Risk Score: 12.9 ( )    CTN attempted to reach the patient for initial [address] Transition call post hospital discharge. Message left with CTN's contact information requesting return phone call.        Follow Up  Future Appointments   Date Time Provider Linda Bowen   2021  4:00 PM SCHEDULE, DERRICK NASCIMENTO AUDIO [employer] [facility]   2021  9:00 AM Thi Lopez MD [facility]   2022  8:00 AM Nena Orta MD [facility] AND [facility]OM[facility]'S [facility]   2022  9:45 AM DO Bren Rojas ENT [facility]   10/25/2022  9:45 AM DO Rolly Rojasman ENT Patience Albright RN

## 2021-11-23 ENCOUNTER — TELEPHONE (OUTPATIENT)
Dept: FAMILY MEDICINE CLINIC | Age: 71
End: 2021-11-23

## 2021-11-23 ENCOUNTER — CARE COORDINATION (OUTPATIENT)
Dept: CASE MANAGEMENT | Age: 71
End: 2021-11-23

## 2021-11-23 DIAGNOSIS — U07.1 PNEUMONIA DUE TO COVID-19 VIRUS: Primary | ICD-10-CM

## 2021-11-23 DIAGNOSIS — J12.82 PNEUMONIA DUE TO COVID-19 VIRUS: Primary | ICD-10-CM

## 2021-11-23 PROCEDURE — 1111F DSCHRG MED/CURRENT MED MERGE: CPT | Performed by: FAMILY MEDICINE

## 2021-11-23 NOTE — TELEPHONE ENCOUNTER
Pt needs Hospital F/U. Tested Positive 11/17/21. Please Advise staff on when to schedule Pt.      Please call Julia Prado at [facility] to Schedule

## 2021-11-23 NOTE — CARE COORDINATION
Request from [address] Monet Fernandez RN.,   Please schedule patient for hospital f/u. Patient is covid +  Called office, they will discuss this with the provider then call this writer back with time and date of appt.     Fior Urrutia, [address] [facility] Coordination Transition

## 2021-11-23 NOTE — CARE COORDINATION
[place] Transitions Initial Follow Up Call    Call within 2 business days of discharge: Yes    Patient: Otto Bennett Patient : 1950   MRN: [id number]  Reason for Admission: Acute respiratory failure with hypoxia  Discharge Date: 21 RARS: Readmission Risk Score: 12.9 ( )      Last Discharge [facility]       Complaint Diagnosis Description Type Department Provider    21 Shortness of Breath; Anorexia Acute respiratory failure with hypoxia [facility]) ED to Hosp-Admission (Discharged) (ADMITTED) SEYZ 6WE C Paramvir Patricia Null, DO; Ren Pu. .. Patient contacted regarding COVID-19 diagnosis. Discussed COVID-19 related testing which was available at this time. Test results were positive. Patient informed of results, if available? Yes.~Pt previously aware of +COVID-19 results (+21)      Care Transition Nurse contacted the patient by telephone to perform post discharge assessment. Call within 2 business days of discharge: Yes. Provided introduction to self, and explanation of the CTN/ACM role, and reason for call due to risk factors for infection and/or exposure to COVID-19. Symptoms reviewed with patient who verbalized the following symptoms: fatigue, cough, no new symptoms and no worsening symptoms. Pt reports that he is doing \"good\" today. Pt discharged from [address] [place] [address] on 21 with acute resp failure, +COVID-19 on 21. Pt denies sob, wheezing, chest congestion, or chest tightness. Pt was not required to be dc'd with the need for O2 or HHC. Pt does not have a home pulse oximeter. Full med review completed and confirmed with pt that he is taking Prednisone as prescribed. Pt voiced no needs/concerns. Pt agreeable to continued outreaches from CTN. Due to no new or worsening symptoms encounter was not routed to provider for escalation. Discussed follow-up appointments. If no appointment was previously scheduled, appointment scheduling offered: Yes. ~CTN offered  assistance with scheduling HFU with pcp. Pt receptive. CTN routed message to  requesting assist.    [facility] follow up appointment(s):   Future Appointments   Date Time Provider Linda Bowen   12/1/2021  4:00 PM SCHEDULE, DERRICK NASCIMENTO AUDIO [employer] [facility]   12/6/2021  9:00 AM Moni Mckeon MD [facility] CARDIO [facility]   1/20/2022  8:00 AM Ferdinand Ferreira MD [facility] AND WOMEN'S [facility]   4/19/2022  9:45 AM DO Bren Ferreira [facility]   10/25/2022  9:45 AM DO Curtis Ferreira [facility]     Non-face-to-face services provided:  Scheduled appointment with PCP-see above  Obtained and reviewed discharge summary and/or continuity of care documents  Assessment and support for treatment adherence and medication management-[id number] entered. Full med review completed. Confirmed pt taking Prednisone rx     Advance Care Planning:   Does patient have an Advance Directive:  Health care decision maker information reviewed and verified. CTN reviewed discharge instructions, medical action plan and red flag symptoms with the patient who verbalized understanding. Discussed exposure protocols and quarantine with CDC Guidelines. Patient was given an opportunity to verbalize any questions and concerns and agrees to contact CTN or health care provider for questions related to their healthcare. Reviewed and educated patient on any new and changed medications related to discharge diagnosis     Was patient discharged with a pulse oximeter? No       CTN provided contact information. Plan for follow-up call in 7-10 days based on severity of symptoms and risk factors.

## 2021-11-26 NOTE — CARE COORDINATION
Dr. Larios Blend office returned call with date and time of appt. Dec 2nd @ 9:45. Called patient he is aware of the above time and date.     Baby Bill, [address] [facility]

## 2021-12-01 ENCOUNTER — PROCEDURE VISIT (OUTPATIENT)
Dept: AUDIOLOGY | Age: 71
End: 2021-12-01

## 2021-12-01 DIAGNOSIS — H90.3 SENSORINEURAL HEARING LOSS, BILATERAL: Primary | ICD-10-CM

## 2021-12-01 PROCEDURE — 99024 POSTOP FOLLOW-UP VISIT: CPT | Performed by: AUDIOLOGIST

## 2021-12-02 ENCOUNTER — OFFICE VISIT (OUTPATIENT)
Dept: FAMILY MEDICINE CLINIC | Age: 71
End: 2021-12-02
Payer: MEDICARE

## 2021-12-02 ENCOUNTER — CARE COORDINATION (OUTPATIENT)
Dept: CASE MANAGEMENT | Age: 71
End: 2021-12-02

## 2021-12-02 VITALS
WEIGHT: 221 LBS | TEMPERATURE: 97 F | HEART RATE: 60 BPM | DIASTOLIC BLOOD PRESSURE: 81 MMHG | BODY MASS INDEX: 33.49 KG/M2 | SYSTOLIC BLOOD PRESSURE: 133 MMHG | RESPIRATION RATE: 20 BRPM | HEIGHT: 68 IN | OXYGEN SATURATION: 94 %

## 2021-12-02 DIAGNOSIS — U07.1 PNEUMONIA DUE TO COVID-19 VIRUS: Primary | ICD-10-CM

## 2021-12-02 DIAGNOSIS — J12.82 PNEUMONIA DUE TO COVID-19 VIRUS: Primary | ICD-10-CM

## 2021-12-02 PROCEDURE — 99495 TRANSJ CARE MGMT MOD F2F 14D: CPT | Performed by: FAMILY MEDICINE

## 2021-12-02 PROCEDURE — 1111F DSCHRG MED/CURRENT MED MERGE: CPT | Performed by: FAMILY MEDICINE

## 2021-12-02 PROCEDURE — 3288F FALL RISK ASSESSMENT DOCD: CPT | Performed by: FAMILY MEDICINE

## 2021-12-02 SDOH — ECONOMIC STABILITY: FOOD INSECURITY: WITHIN THE PAST 12 MONTHS, YOU WORRIED THAT YOUR FOOD WOULD RUN OUT BEFORE YOU GOT MONEY TO BUY MORE.: NEVER TRUE

## 2021-12-02 SDOH — ECONOMIC STABILITY: FOOD INSECURITY: WITHIN THE PAST 12 MONTHS, THE FOOD YOU BOUGHT JUST DIDN'T LAST AND YOU DIDN'T HAVE MONEY TO GET MORE.: NEVER TRUE

## 2021-12-02 ASSESSMENT — SOCIAL DETERMINANTS OF HEALTH (SDOH): HOW HARD IS IT FOR YOU TO PAY FOR THE VERY BASICS LIKE FOOD, HOUSING, MEDICAL CARE, AND HEATING?: NOT HARD AT ALL

## 2021-12-02 NOTE — CARE COORDINATION
You Patient resolved from the [address] Transitions episode on 12/2/21               Patient currently reports that the following symptoms have improved:  fatigue, cough, shortness of breath and no new/worsening symptoms. Pt dc'd from [facility] on 11/21 with acute resp failure d/t COVID-19 (+11/17/21). Pt reports he is doing well. Pt states that he has some mild sob with exertion, but this is improving. Pt had a f/u with his pcp today and states that his pcp was pleased with his progress. Pt will f/u in Jan with his pcp again. Pt voiced no needs/concerns. Pt agreeable with today being the final outreach. CTN will sign off. No further outreach scheduled with this CTN/ACM. Episode of Care resolved. Patient has this CTN/ACM contact information if future needs arise.

## 2021-12-02 NOTE — PROGRESS NOTES
Fit with binaural  [employer]  hearing aids. Instructed in use and care. Gave  battery charger, warranty information and scheduled  WILL CALL TO SCHEDULE . Made following adjustments: feedback ran. Hearing aid contract/battery warning form reviewed and signed. Hearing aids NOT paired to phone babita. Patient was satisfied and will follow up on above date, unless problems arise. No charge visit today.  Will bill at 30 day follow up      Electronically signed by Tianna Reyes on 12/2/2021 at 4:32 PM          4.0 0L      4.0   0R  Speaker sizes

## 2021-12-02 NOTE — PROGRESS NOTES
(PRINIVIL;ZESTRIL) 20 MG tablet Take 1 tablet by mouth daily 90 tablet 1    metoprolol succinate (TOPROL XL) 50 MG extended release tablet Take 1 tablet by mouth 2 times daily Take am dos 11/16 180 tablet 1    omeprazole (PRILOSEC) 40 MG delayed release capsule Take 1 capsule by mouth 2 times daily 60 capsule 0    Cholecalciferol (VITAMIN D3) 50 MCG (2000 UT) CAPS Take 1 capsule by mouth daily Ld 11/12      aspirin 81 MG chewable tablet Take 1 tablet by mouth daily (Patient taking differently: Take 81 mg by mouth daily Ld 11/09) 30 tablet 3        Medications patient taking as of now reconciled against medications ordered at time of hospital discharge: Yes    Chief Complaint   Patient presents with    Follow-Up from [address] [place] [address] on 11/19/21        History of Present illness and Inpatient course per inpt team:     presented to outside emergency department with complaints of fatigue.  This been going on for about 2 weeks.  He also endorses lightheadedness, decreased appetite and cough.  Cough is nonproductive.  He denies fever, chills, chest pain, nausea, vomiting, abdominal pain.  The patient did receive both his Covid vaccinations.  Patient noted to drop to 83% while ambulating.  Laboratory studies notable for sodium 130, lactic acid 2.7, glucose 127, proBNP 959, troponin 19.  COVID-19 positive.  CT chest shows multifocal bilateral groundglass and semisolid pulmonary infiltrates most consistent with Covid pneumonia.  Patient was then transferred to [facility] for further treatment.     -COVID-19 pneumonia  -Acute hypoxic respiratory failure  -Lactic acidosis  -Elevated troponin  -Hypertension  -Coronary artery disease     Admitted on 3 loters o2 nc              Positive steroids and toci              o2 titrated to room air by 11-21     Planned dc home  Continue with home isolation     Follow up with pcp in 10 days     Repeat chest xray in 4 weeks for re-eval     Heart reg rate  Lungs cta bilat  Ext no edema     Dc plan and instructions 38 min    Impression   1. There is no evidence of a pulmonary embolus   2. Multifocal bilateral ground-glass and semi solid pulmonary infiltrates   most consistent with COVID pneumonia. 3. Significant kyphosis of the thoracic spine.  There is no compression   fracture. Interval history/Current status:   Feels much better. Still some morton, but improving. No abdominal pain, nausea, vomiting, diarrhea, fever, chills,  sx's, CP, palpitations, blurred vision, HA, lightheadedness, LOC or focal neurological deficits. Not on O2      Vitals:    12/02/21 1019   BP: 133/81   Site: Left Upper Arm   Position: Sitting   Cuff Size: Large Adult   Pulse: 60   Resp: 20   Temp: 97 °F (36.1 °C)   TempSrc: Temporal   SpO2: 94%   Weight: 221 lb (100.2 kg)   Height: 5' 8\" (1.727 m)     Body mass index is 33.6 kg/m².    Wt Readings from Last 3 Encounters:   12/02/21 221 lb (100.2 kg)   11/17/21 221 lb (100.2 kg)   10/19/21 220 lb (99.8 kg)     BP Readings from Last 3 Encounters:   12/02/21 133/81   11/21/21 128/70   10/19/21 (!) 149/86        Physical Exam:  General Appearance: alert and oriented to person, place and time, well developed and well- nourished, in no acute distress  Skin: warm and dry, no rash or erythema  Head: normocephalic and atraumatic  Eyes: pupils equal, round, and reactive to light, extraocular eye movements intact, conjunctivae normal  ENT: tympanic membrane, external ear and ear canal normal bilaterally, nose without deformity, nasal mucosa and turbinates normal without polyps  Neck: supple and non-tender without mass, no thyromegaly or thyroid nodules, no cervical lymphadenopathy  Pulmonary/Chest: clear to auscultation bilaterally- no wheezes, rales or rhonchi, normal air movement, no respiratory distress  Cardiovascular: normal rate, regular rhythm, normal S1 and S2, no murmurs, rubs, clicks, or gallops, distal pulses intact, no carotid bruits  Abdomen: soft, non-tender, non-distended, normal bowel sounds, no masses or organomegaly  Extremities: no cyanosis, clubbing or edema  Musculoskeletal: normal range of motion, no joint swelling, deformity or tenderness  Neurologic: reflexes normal and symmetric, no cranial nerve deficit, gait, coordination and speech normal      Cornell Reeves was seen today for follow-up from hospital.    Diagnoses and all orders for this visit:    Pneumonia due to COVID-19 virus     CPM, CTM.    Get dose #3 when appropriate      Medical Decision Making: moderate complexity

## 2021-12-06 ENCOUNTER — OFFICE VISIT (OUTPATIENT)
Dept: CARDIOLOGY CLINIC | Age: 71
End: 2021-12-06
Payer: MEDICARE

## 2021-12-06 VITALS
DIASTOLIC BLOOD PRESSURE: 60 MMHG | WEIGHT: 219 LBS | RESPIRATION RATE: 26 BRPM | BODY MASS INDEX: 33.19 KG/M2 | HEART RATE: 71 BPM | SYSTOLIC BLOOD PRESSURE: 110 MMHG | HEIGHT: 68 IN

## 2021-12-06 DIAGNOSIS — Z87.09 H/O ACUTE RESPIRATORY FAILURE: ICD-10-CM

## 2021-12-06 DIAGNOSIS — E66.09 NON MORBID OBESITY DUE TO EXCESS CALORIES: ICD-10-CM

## 2021-12-06 DIAGNOSIS — I25.10 CORONARY ARTERY DISEASE INVOLVING NATIVE CORONARY ARTERY OF NATIVE HEART WITHOUT ANGINA PECTORIS: Primary | ICD-10-CM

## 2021-12-06 DIAGNOSIS — N18.2 CKD (CHRONIC KIDNEY DISEASE) STAGE 2, GFR 60-89 ML/MIN: ICD-10-CM

## 2021-12-06 DIAGNOSIS — I25.2 HISTORY OF ACUTE INFERIOR WALL MI: ICD-10-CM

## 2021-12-06 DIAGNOSIS — M17.10 ARTHRITIS OF KNEE: ICD-10-CM

## 2021-12-06 DIAGNOSIS — Z87.898 HISTORY OF PREDIABETES: ICD-10-CM

## 2021-12-06 DIAGNOSIS — Z86.39 H/O VITAMIN D DEFICIENCY: ICD-10-CM

## 2021-12-06 DIAGNOSIS — I10 ESSENTIAL HYPERTENSION: ICD-10-CM

## 2021-12-06 DIAGNOSIS — I73.9 PVD (PERIPHERAL VASCULAR DISEASE) WITH CLAUDICATION (HCC): ICD-10-CM

## 2021-12-06 DIAGNOSIS — Z98.61 HISTORY OF PTCA: ICD-10-CM

## 2021-12-06 DIAGNOSIS — E79.0 HYPERURICEMIA: ICD-10-CM

## 2021-12-06 DIAGNOSIS — Z86.16 HISTORY OF COVID-19: ICD-10-CM

## 2021-12-06 PROCEDURE — 99213 OFFICE O/P EST LOW 20 MIN: CPT | Performed by: INTERNAL MEDICINE

## 2021-12-06 PROCEDURE — 93000 ELECTROCARDIOGRAM COMPLETE: CPT | Performed by: INTERNAL MEDICINE

## 2021-12-06 NOTE — PROGRESS NOTES
OFFICE VISIT        PRIMARY CARE PHYSICIAN:    Ezio Lopez MD       ALLERGIES / SENSITIVITIES:    No Known Allergies       REVIEWED MEDICATIONS:      Current Outpatient Medications:     atorvastatin (LIPITOR) 40 MG tablet, Take 1 tablet by mouth daily, Disp: 90 tablet, Rfl: 1    nitroGLYCERIN (NITROSTAT) 0.4 MG SL tablet, Place 1 tablet under the tongue every 5 minutes as needed for Chest pain, Disp: 25 tablet, Rfl: 1    amLODIPine (NORVASC) 10 MG tablet, Take 1 tablet by mouth daily, Disp: 90 tablet, Rfl: 1    isosorbide mononitrate (IMDUR) 30 MG extended release tablet, Take 1 tablet by mouth daily, Disp: 90 tablet, Rfl: 1    clopidogrel (PLAVIX) 75 MG tablet, Take 1 tablet by mouth daily, Disp: 90 tablet, Rfl: 1    famotidine (PEPCID) 20 MG tablet, Take 1 tablet by mouth 2 times daily, Disp: 180 tablet, Rfl: 1    lisinopril (PRINIVIL;ZESTRIL) 20 MG tablet, Take 1 tablet by mouth daily, Disp: 90 tablet, Rfl: 1    metoprolol succinate (TOPROL XL) 50 MG extended release tablet, Take 1 tablet by mouth 2 times daily Take am dos 11/16, Disp: 180 tablet, Rfl: 1    Cholecalciferol (VITAMIN D3) 50 MCG (2000 UT) CAPS, Take 1 capsule by mouth daily Ld 11/12, Disp: , Rfl:     aspirin 81 MG chewable tablet, Take 1 tablet by mouth daily (Patient taking differently: Take 81 mg by mouth daily Ld 11/09), Disp: 30 tablet, Rfl: 3    omeprazole (PRILOSEC) 40 MG delayed release capsule, Take 1 capsule by mouth 2 times daily (Patient not taking: Reported on 12/6/2021), Disp: 60 capsule, Rfl: 0      S: REASON FOR VISIT:    Coronary artery disease. Mr. Christophe Mcleod is a 80-year-old male with a cardiovascular history as described below. He was last seen in the office in 9/2020. He had been vaccinated for Covid. He was hospitalized, however, from 11/17/2021 to 11/21/2021 with Covid 19 infection and hypoxic respiratory failure. Since discharge from the hospital, he has been doing well, however.   He had undergone physical therapy for his lower extremity and right knee discomfort with improvement. He denies chest pain or dyspnea with his daily activities. He denies orthopnea, PND's, or lower extremity swelling. He denies palpitations, dizziness, presyncope or syncope. REVIEW OF SYSTEMS:    CONSTITUTIONAL:  Denies fevers, chills, or night sweats. He reports easy fatigability.    HEENT:  Denies any unusual headaches.  He has diminished hearing, but denies any recent changes in vision.  Denies dysphagia, hoarseness, hemoptysis, hematemesis or epistaxis. ENDOCRINE:  Denies polyphagia, polydipsia or polyuria.  Denies heat or cold intolerance. Jose Manuel Area has right knee arthritic pain.  He also complains of bilateral lower extremity pains with walking from thighs down to feet.     SKIN:  Denies rashes, ulcers or itching. HEME/LYMPH:  Denies any palpable lymph nodes, bleeding or easy bruisability. HEART:  As above. LUNGS:  Denies cough or sputum production. GI: Denies abdominal pain, nausea, vomiting, diarrhea, constipation, rectal bleeding or tarry stools. :  Denies hematuria or dysuria. PSYCHIATRIC:  Denies mood changes, anxiety or depression. NEUROLOGIC:  Denies memory loss, motor weakness, numbness, tingling or tremors.                    CARDIOVASCULAR HISTORY:    1. Hypertension  2. Obesity  3. Coronary artery disease:  a.   S/P acute ST elevation inferior wall myocardial infarction on 10/23/2018.    b.  cardiac catheterization and angioplasty, 10/23/2018: Left main, no significant disease.  LAD around 20% proximal/mid vessel narrowing.  70-80% disease of the 1st diagonal branch and 90% disease of the 2nd diagonal branch.  LCX, 80-90% stenosis of the 1st marginal branch and sequential 60, 80 and 60% stenoses in the mid to distal left circumflex before the large terminal lateral branch.  RCA, dominant vessel, which is occluded proximally with 60% distal vessel stenosis.  Elevated left ventricular end diastolic pressure consistent with LV dysfunction.  Extensively tortuous right innominate artery and thoracic aorta. Successful balloon angioplasty with deployment of the drug-eluting coronary stent to the proximal RCA with post stent deployment dilatation with a larger, noncompliant balloon with very good results. james Geronimoobi Rupert nuclear stress test done on 3/18/2019 showed a moderate sized, moderately severe fixed defect involving the mid to basal inferior wall extending into the apical inferolateral wall consistent with a prior myocardial infarction with no evidence of significant residual stress-induced ischemia.  The study was non gated.    4.  Echocardiogram done on 10/23/2018 was read as being a technically limited study with only off axis apical imaging with grossly normal left ventricular size with no gross regional wall motion abnormality with overall normal ejection fraction.    5.  Peripheral arterial disease of the lower extremities. a. Normal ankle/arm index with good arterial flow to both feet including the toes, based upon the pulse volume recordings. On bilateral SCOTTY studies done on 7/20/2020. 6.  History of acute kidney injury with ace inhibitor therapy.    7.  Prediabetes. Hemoglobin A1C, 8/4/2020 = 6.1.       PAST MEDICAL HISTORY:  1.  As under cardiovascular history.    2.  Chronic kidney disease.    3.  Vitamin D deficiency. 4.  Right knee arthritis. 5.  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     6.  Hospitalized with Covid 19 pneumonia and hypoxic respiratory failure in 11/2021.       FAMILY HISTORY  Father had cancer and mother had dementia, hypertension and diabetes and heart disease in his brother and sister have coronary artery disease.     SOCIAL HISTORY:              No tobacco or alcohol or illicit drugs.        O:  COMPLETE PHYSICAL EXAM:      /60   Pulse 71   Resp 26   Ht 5' 8\" (1.727 m)   Wt 219 lb (99.3 kg)   BMI 33.30 kg/m² General:          Well-nourished, well-developed, obese male in no acute distress. HEENT:           Normocephalic and atraumatic head. No JVD. No carotid bruits. Carotid upstrokes normal bilaterally.  No thyromegaly.    Sclerae not icteric.  No xanthelasmas.  Mucous membranes moist.  Chest:              Symmetrical and nontender.  No deformities. Lungs:             Clear to auscultation bilaterally. Heart:              Normal S1 and S2.  No S3 or S4.  No murmurs or rubs. Abdomen:        Soft, nontender without organomegaly or masses.  No bruits.  Normal bowel sounds. Extremities:     No edema. Pulses palpable.    Skin:                Warm and dry.  Normal turgor. No rashes or ulcers noted. Neurologic:      Oriented x3.  No motor or sensory deficits detected.        REVIEW OF DIAGNOSTIC TESTS:    1. Blood tests from 11/21/2021 reviewed:  Hemoglobin 12.4, hematocrit 12.7, BUN 37, creatinine 1.1, GFR > 60, potassium 4.6, albumin 3.4.    2. EKG done today showed sinus rhythm with voltage criteria for LVH, but was otherwise within normal limits. ASSESSMENT / DIAGNOSIS:   1.  Coronary artery disease:  Clinically stable.    2.  Hypertension:  Adequately controlled. 3.  Chronic kidney disease with history of acute kidney injury. 4.  Prediabetes.    5.  Peripheral vascular disease with claudication.  Lower extremity ankle brachial indices done on 7/20/2020 were reported as showing normal ankle/arm index with good arterial flow to both feet including the toes based upon the pulse volume recordings. 6.  Obesity.    7.  History of vitamin D deficiency. 8.  History of hyperuricemia. 9.  Right knee arthritis. 10.  Covid 19 pneumonia and acute hypoxic respiratory failure in 11/2021 (had been vaccinated). TREATMENT PLAN:  1. Reassure. 2.  Continue current cardiac medications. 3.  Follow up with PCP and Nephrology as per routine.   4.  Follow up with Cardiology in 1 year or on a prn basis.   5.  Consider a myocardial perfusion imaging study in 1 year for follow up coronary artery disease. [address]  [place] [address]  [phone]

## 2021-12-15 ENCOUNTER — PROCEDURE VISIT (OUTPATIENT)
Dept: AUDIOLOGY | Age: 71
End: 2021-12-15

## 2021-12-15 DIAGNOSIS — H90.3 SENSORINEURAL HEARING LOSS, BILATERAL: Primary | ICD-10-CM

## 2021-12-15 PROCEDURE — 99999 PR OFFICE/OUTPT VISIT,PROCEDURE ONLY: CPT | Performed by: AUDIOLOGIST

## 2021-12-15 NOTE — PROGRESS NOTES
Discussed options for patient to try new masks to keep hearing aids on ears.    Scheduled for Jan 20th 2021 for follow up

## 2021-12-28 NOTE — PROGRESS NOTES
POD # 7    S: Pt doing well, tolerating PO, using ointment to incision    O: Incision C/D/I, no hematoma    Path: Diagnosis:   Right nasal skin lesion:   Basal cell carcinoma, focally present at the deep margin. B.  Right nasal skin lesion, new deep margin:   Negative for carcinoma. Specimen consists of skeletal muscle, adipose tissue, and lobules of   sebaceous glands. A: S/P excision of nasal basal cell carcinoma    P: pt is doing well, path reviewed with the patient, continue ointment to incision three times daily    Follow up in 1 month    Patient seen, examined, and plan discussed with Dr. Aliyah Ortiz    Electronically signed by Kerry Tucker DO on 11/25/2020 at 11:43 AM              Umm Sen  1950    I have discussed the case, including pertinent history and exam findings with the resident. I have seen and examined the patient and the key elements of the encounter have been performed by me. I agree with the assessment, plan and orders as documented by the  resident              Remainder of medical problems as per  resident note. Patient seen and examined. Agree with above exam, assessment and plan.       Electronically signed by Keiry Sparrow DO on 12/7/20 at 9:54 AM EST
28-Dec-2021 20:30

## 2022-01-20 ENCOUNTER — OFFICE VISIT (OUTPATIENT)
Dept: FAMILY MEDICINE CLINIC | Age: 72
End: 2022-01-20
Payer: MEDICARE

## 2022-01-20 VITALS
WEIGHT: 228 LBS | SYSTOLIC BLOOD PRESSURE: 136 MMHG | RESPIRATION RATE: 20 BRPM | HEIGHT: 68 IN | OXYGEN SATURATION: 99 % | BODY MASS INDEX: 34.56 KG/M2 | TEMPERATURE: 97.2 F | DIASTOLIC BLOOD PRESSURE: 82 MMHG | HEART RATE: 68 BPM

## 2022-01-20 DIAGNOSIS — I73.9 CLAUDICATION (HCC): ICD-10-CM

## 2022-01-20 DIAGNOSIS — K21.9 GASTROESOPHAGEAL REFLUX DISEASE, UNSPECIFIED WHETHER ESOPHAGITIS PRESENT: ICD-10-CM

## 2022-01-20 DIAGNOSIS — I25.119 ATHEROSCLEROSIS OF NATIVE CORONARY ARTERY OF NATIVE HEART WITH ANGINA PECTORIS (HCC): ICD-10-CM

## 2022-01-20 DIAGNOSIS — I10 ESSENTIAL HYPERTENSION: ICD-10-CM

## 2022-01-20 DIAGNOSIS — I20.9 ANGINA PECTORIS, UNSPECIFIED (HCC): ICD-10-CM

## 2022-01-20 DIAGNOSIS — I25.10 CORONARY ARTERY DISEASE INVOLVING NATIVE CORONARY ARTERY OF NATIVE HEART WITHOUT ANGINA PECTORIS: ICD-10-CM

## 2022-01-20 PROCEDURE — G0439 PPPS, SUBSEQ VISIT: HCPCS | Performed by: FAMILY MEDICINE

## 2022-01-20 PROCEDURE — 99214 OFFICE O/P EST MOD 30 MIN: CPT | Performed by: FAMILY MEDICINE

## 2022-01-20 RX ORDER — METOPROLOL SUCCINATE 50 MG/1
50 TABLET, EXTENDED RELEASE ORAL 2 TIMES DAILY
Qty: 180 TABLET | Refills: 1 | Status: SHIPPED
Start: 2022-01-20 | End: 2022-07-20 | Stop reason: SDUPTHER

## 2022-01-20 RX ORDER — CLOPIDOGREL BISULFATE 75 MG/1
75 TABLET ORAL DAILY
Qty: 90 TABLET | Refills: 1 | Status: SHIPPED
Start: 2022-01-20 | End: 2022-07-20 | Stop reason: SDUPTHER

## 2022-01-20 RX ORDER — OMEPRAZOLE 40 MG/1
40 CAPSULE, DELAYED RELEASE ORAL 2 TIMES DAILY
Qty: 60 CAPSULE | Refills: 0 | Status: SHIPPED
Start: 2022-01-20 | End: 2022-04-21 | Stop reason: SDUPTHER

## 2022-01-20 RX ORDER — LISINOPRIL 20 MG/1
20 TABLET ORAL DAILY
Qty: 90 TABLET | Refills: 1 | Status: SHIPPED
Start: 2022-01-20 | End: 2022-07-20 | Stop reason: SDUPTHER

## 2022-01-20 RX ORDER — ISOSORBIDE MONONITRATE 30 MG/1
30 TABLET, EXTENDED RELEASE ORAL DAILY
Qty: 90 TABLET | Refills: 1 | Status: SHIPPED
Start: 2022-01-20 | End: 2022-07-20 | Stop reason: SDUPTHER

## 2022-01-20 RX ORDER — FAMOTIDINE 20 MG/1
20 TABLET, FILM COATED ORAL 2 TIMES DAILY
Qty: 180 TABLET | Refills: 1 | Status: SHIPPED
Start: 2022-01-20 | End: 2022-07-20 | Stop reason: SDUPTHER

## 2022-01-20 RX ORDER — AMLODIPINE BESYLATE 10 MG/1
10 TABLET ORAL DAILY
Qty: 90 TABLET | Refills: 1 | Status: SHIPPED
Start: 2022-01-20 | End: 2022-07-20 | Stop reason: SDUPTHER

## 2022-01-20 ASSESSMENT — PATIENT HEALTH QUESTIONNAIRE - PHQ9
SUM OF ALL RESPONSES TO PHQ QUESTIONS 1-9: 0
SUM OF ALL RESPONSES TO PHQ9 QUESTIONS 1 & 2: 0
1. LITTLE INTEREST OR PLEASURE IN DOING THINGS: 0
SUM OF ALL RESPONSES TO PHQ QUESTIONS 1-9: 0
SUM OF ALL RESPONSES TO PHQ QUESTIONS 1-9: 0
2. FEELING DOWN, DEPRESSED OR HOPELESS: 0
SUM OF ALL RESPONSES TO PHQ QUESTIONS 1-9: 0

## 2022-01-20 NOTE — PROGRESS NOTES
FM Progress Note    Subjective:   GERD. Pepcid and prilosec. Controlled aside from occasional nighttime heartburn with some belching. Once a week. Just flew back from [facility] on Tuesday. DELATORRE. Had covid 2 mo ago. Can walk in store and up steps ok, just noticed it twice going to his gait. No leg swelling. No weight gain to pt's knowledge. No cp. Wells score = 0 for PE today. Just saw Cards in December. No cough. Denies any sob now. CAD. Plavix. HTN. Metoprolol. Lisinopril. Amlidipine. Imdur. Controlled. Health Maintenance Due   Topic Date Due    DTaP/Tdap/Td vaccine (1 - Tdap) Never done    Shingles Vaccine (1 of 2) Never done    Pneumococcal 65+ years Vaccine (1 of 1 - PPSV23) Never done   [employer] Visit (AWV)  06/12/2021    Flu vaccine (1) Never done    COVID-19 Vaccine (3 - Booster for Pfizer series) 09/01/2021    Depression Screen  01/22/2022         Objective:   /82 (Site: Left Upper Arm, Position: Sitting, Cuff Size: Large Adult)   Pulse 68   Temp 97.2 °F (36.2 °C) (Temporal)   Resp 20   Ht 5' 8\" (1.727 m)   Wt 228 lb (103.4 kg)   SpO2 99%   BMI 34.67 kg/m²   General appearance: NAD, alert and interacting appropriately  HEENT: NCAT, PERRLA, EOMI   Resp: CTAB, no WRC  CVS: RRR, no MRG  Abdomen: BS +, SNDNT  Extremities: No clubbing, cyanosis, or edema. Warm. Dry. I have reviewed this patient's previous records. I have reviewed this patient's labs. I have reviewed this patient's medications. Assessment/Plan:    Zach Sandoval was seen today for medicare awv. Diagnoses and all orders for this visit:    Gastroesophageal reflux disease, unspecified whether esophagitis present  -     omeprazole (PRILOSEC) 40 MG delayed release capsule; Take 1 capsule by mouth 2 times daily  -     famotidine (PEPCID) 20 MG tablet; Take 1 tablet by mouth 2 times daily    Essential hypertension  -     metoprolol succinate (TOPROL XL) 50 MG extended release tablet;  Take 1 tablet by mouth 2 times daily Take am dos 11/16  -     lisinopril (PRINIVIL;ZESTRIL) 20 MG tablet; Take 1 tablet by mouth daily  -     amLODIPine (NORVASC) 10 MG tablet; Take 1 tablet by mouth daily    Coronary artery disease involving native coronary artery of native heart without angina pectoris  -     metoprolol succinate (TOPROL XL) 50 MG extended release tablet; Take 1 tablet by mouth 2 times daily Take am dos 11/16  -     isosorbide mononitrate (IMDUR) 30 MG extended release tablet; Take 1 tablet by mouth daily  -     clopidogrel (PLAVIX) 75 MG tablet; Take 1 tablet by mouth daily    Claudication (HCC)  -     clopidogrel (PLAVIX) 75 MG tablet; Take 1 tablet by mouth daily    Angina pectoris, unspecified    Atherosclerosis of native coronary artery of native heart with angina pectoris [facility])              Patient Instructions   Make an appointment with the [address] [place] [address] or the [facility] attached [facility] the hospital downtown. Eat 3 meals per day. Please call every pharmacy around and ask if they provide the shingles vaccine and how much it costs. If it's affordable please get it and let me know when you've received it. Please call every pharmacy around and ask if they provide the Tdap (tetanus, diphtheria, acellular pertussis [whooping cough]) vaccine and how much it costs. If it's affordable please get it and let me know when you've received it. Return in about 3 months (around 4/20/2022) for DELATORRE.         Electronically signed by Cari Tinoco MD on 1/20/2022 at 8:40 AM

## 2022-01-20 NOTE — PATIENT INSTRUCTIONS
Make an appointment with the [address] [place] [address] or the [facility] Clinic attached to the hospital [facility]. Eat 3 meals per day. Please call every pharmacy around and ask if they provide the shingles vaccine and how much it costs. If it's affordable please get it and let me know when you've received it. Please call every pharmacy around and ask if they provide the Tdap (tetanus, diphtheria, acellular pertussis [whooping cough]) vaccine and how much it costs. If it's affordable please get it and let me know when you've received it.

## 2022-01-20 NOTE — PROGRESS NOTES
Medicare Annual Wellness Visit  Name: Zen Plan Date: 2022   MRN: [id number] Sex: Male   Age: 67 y.o. Ethnicity: Non- / Non    : 1950 Race: White (non-)      Nazanin Look is here for Medicare AWV (no complaints)    Screenings for behavioral, psychosocial and functional/safety risks, and cognitive dysfunction are all negative except as indicated below. These results, as well as other patient data from the [address] form, are documented in Flowsheets linked to this Encounter. No Known Allergies    Prior to Visit Medications    Medication Sig Taking?  Authorizing Provider   atorvastatin (LIPITOR) 40 MG tablet Take 1 tablet by mouth daily Yes Mal Holliday MD   nitroGLYCERIN (NITROSTAT) 0.4 MG SL tablet Place 1 tablet under the tongue every 5 minutes as needed for Chest pain Yes Mal Holliday MD   amLODIPine (NORVASC) 10 MG tablet Take 1 tablet by mouth daily Yes Mal Holliday MD   isosorbide mononitrate (IMDUR) 30 MG extended release tablet Take 1 tablet by mouth daily Yes Mal Holliday MD   clopidogrel (PLAVIX) 75 MG tablet Take 1 tablet by mouth daily Yes Mal Holliday MD   famotidine (PEPCID) 20 MG tablet Take 1 tablet by mouth 2 times daily Yes Mal Holliday MD   lisinopril (PRINIVIL;ZESTRIL) 20 MG tablet Take 1 tablet by mouth daily Yes Mal Holliday MD   metoprolol succinate (TOPROL XL) 50 MG extended release tablet Take 1 tablet by mouth 2 times daily Take am dos  Yes Mal Holliday MD   omeprazole (PRILOSEC) 40 MG delayed release capsule Take 1 capsule by mouth 2 times daily Yes Mal Holliday MD   Cholecalciferol (VITAMIN D3) 50 MCG (2000) CAPS Take 1 capsule by mouth daily Ld  Yes Historical Provider, MD   aspirin 81 MG chewable tablet Take 1 tablet by mouth daily  Patient taking differently: Take 81 mg by mouth daily Ld  Yes Lara Elliott DO       Past Medical History:   Diagnosis Date    Bilateral leg and foot pain     CAD (coronary artery disease)     follows with Dr Tessie Chin Hypertension     Nasal mass 11/2020    STEMI (ST elevation myocardial infarction) ([address] 10/23/2018       Past Surgical History:   Procedure Laterality Date    CORONARY ANGIOPLASTY WITH STENT PLACEMENT  10/23/2018    3.5 x 26 Resolute José to mid RCA by Dr. Chani Mehta 11/16/2020    EXCISION RIGHT NASAL LESION WITH RECONSTRUCTION WITH FROZEN SECTION performed by Mahi Viera DO at [facility] OR       Family History   Problem Relation Age of Onset    High Blood Pressure Mother     Heart Disease Mother     Dementia Mother     Diabetes Mother     Cancer Father 62        lung, tongue, brain    Heart Attack Sister     Heart Attack Brother        CareTeam (Including outside providers/suppliers regularly involved in providing care):   Patient Care Team:  Cari Tinoco MD as PCP - General (Family Medicine)  Cari Tinoco MD as PCP - REHABILITATION [facility] Empaneled Provider  Rickie Braden MD as Consulting Physician (Nephrology)    Wt Readings from Last 3 Encounters:   01/20/22 228 lb (103.4 kg)   12/06/21 219 lb (99.3 kg)   12/02/21 221 lb (100.2 kg)     Vitals:    01/20/22 0801   BP: 136/82   Site: Left Upper Arm   Position: Sitting   Cuff Size: Large Adult   Pulse: 68   Resp: 20   Temp: 97.2 °F (36.2 °C)   TempSrc: Temporal   SpO2: 99%   Weight: 228 lb (103.4 kg)   Height: 5' 8\" (1.727 m)     Body mass index is 34.67 kg/m². Based upon direct observation of the patient, evaluation of cognition reveals recent and remote memory intact. Patient's complete Health Risk Assessment and screening values have been reviewed and are found in Flowsheets. The following problems were reviewed today and where indicated follow up appointments were made and/or referrals ordered.       Positive Risk Factor Screenings with Interventions:            General Health and ACP:  General  In general, how would you say your health is?: Good  In the past 7 days, have you experienced any of the following?  New or Increased Pain, New or Increased Fatigue, Loneliness, Social Isolation, Stress or Anger?: None of These  Do you get the social and emotional support that you need?: Yes  Do you have a Living Will?: (!) No  Advance Directives     Power of Korina Gomez Will ACP-Advance Directive ACP-Power of     Not on File Not on File Not on File Not on File      General Health Risk Interventions:  · No Living Will: Advance Care Planning addressed with patient today    Health Habits/Nutrition:  Health Habits/Nutrition  Do you exercise for at least 20 minutes 2-3 times per week?: Yes  Have you lost any weight without trying in the past 3 months?: No  Do you eat only one meal per day?: (!) Yes  Have you seen the dentist within the past year?: (!) No  Body mass index: (!) 34.66  Health Habits/Nutrition Interventions:  · Nutritional issues:  patient is not ready to address his/her nutritional/weight issues at this time  · Dental exam overdue:  patient encouraged to make appointment with his/her dentist    Hearing/Vision:  No exam data present  Hearing/Vision  Do you or your family notice any trouble with your hearing that hasn't been managed with hearing aids?: (!) Yes  Do you have difficulty driving, watching TV, or doing any of your daily activities because of your eyesight?: No  Have you had an eye exam within the past year?: Yes  Hearing/Vision Interventions:  · Hearing concerns:  patient declines any further evaluation/treatment for hearing issues    Safety:  Safety  Do you have working smoke detectors?: Yes  Have all throw rugs been removed or fastened?: Yes  Do you have non-slip mats or surfaces in all bathtubs/showers?: (!) No  Do all of your stairways have a railing or banister?: Yes  Are your doorways, halls and stairs free of clutter?: Yes  Do you always fasten your seatbelt when you are in a car?: Yes  Safety Interventions:  · Home safety tips provided       Personalized Preventive Plan   Current Health Maintenance Status  Immunization History   Administered Date(s) Administered    COVID-19, Pfizer Purple top, DILUTE for use, 12+ yrs, 30mcg/0.3mL dose 02/04/2021, 03/01/2021        Health Maintenance   Topic Date Due    DTaP/Tdap/Td vaccine (1 - Tdap) Never done    Shingles Vaccine (1 of 2) Never done    Pneumococcal 65+ years Vaccine (1 of 1 - PPSV23) Never done   [employer] Visit (AWV)  06/12/2021    Flu vaccine (1) Never done    COVID-19 Vaccine (3 - Booster for Pfizer series) 09/01/2021    Depression Screen  01/22/2022    A1C test (Diabetic or Prediabetic)  02/08/2022    Lipid screen  02/08/2022    Potassium monitoring  11/21/2022    Creatinine monitoring  11/21/2022    Colon cancer screen colonoscopy  06/29/2030    Hepatitis C screen  Completed    Hepatitis A vaccine  Aged Out    Hepatitis B vaccine  Aged Out    Hib vaccine  Aged Out    Meningococcal (ACWY) vaccine  Aged Out     Recommendations for [place] Due: see orders and patient instructions/AVS.  . Recommended screening schedule for the next 5-10 years is provided to the patient in written form: see Patient Go Landers was seen today for medicare awv. Diagnoses and all orders for this visit:    Gastroesophageal reflux disease, unspecified whether esophagitis present    Essential hypertension    Coronary artery disease involving native coronary artery of native heart without angina pectoris    Claudication ([facility] [address]           Declined PNA and flu shot. Getting covid booster tomorrow.

## 2022-01-27 ENCOUNTER — PROCEDURE VISIT (OUTPATIENT)
Dept: AUDIOLOGY | Age: 72
End: 2022-01-27

## 2022-01-27 DIAGNOSIS — H90.3 SENSORINEURAL HEARING LOSS, BILATERAL: Primary | ICD-10-CM

## 2022-01-27 PROCEDURE — 99999 PR OFFICE/OUTPT VISIT,PROCEDURE ONLY: CPT | Performed by: AUDIOLOGIST

## 2022-01-28 NOTE — PROGRESS NOTES
Patient here for 1 month hearing aid check    Likes hearing aids - helps him hear but cannot keep in. Will make custom molds to help with retention. Molds taken bilaterally without incident. Will order - may take a few weeks to come in      Reprogram acoustic parameters when give molds.        DID NOT BILL      NEEDS BILLED AT FOLLOW UP - will call to [facility] once get sent it

## 2022-02-01 ENCOUNTER — HOSPITAL ENCOUNTER (OUTPATIENT)
Age: 72
Discharge: HOME | End: 2022-02-01
Payer: MEDICARE

## 2022-02-01 LAB
ALBUMIN SERPL-MCNC: 4.3 G/DL (ref 3.5–5.2)
ANION GAP SERPL CALCULATED.3IONS-SCNC: 12 MMOL/L (ref 7–16)
BASOPHILS ABSOLUTE: 0.04 E9/L (ref 0–0.2)
BASOPHILS RELATIVE PERCENT: 0.5 % (ref 0–2)
BUN BLDV-MCNC: 20 MG/DL (ref 6–23)
CALCIUM SERPL-MCNC: 9.6 MG/DL (ref 8.6–10.2)
CHLORIDE BLD-SCNC: 108 MMOL/L (ref 98–107)
CO2: 23 MMOL/L (ref 22–29)
CREAT SERPL-MCNC: 1.2 MG/DL (ref 0.7–1.2)
EOSINOPHILS ABSOLUTE: 0.12 E9/L (ref 0.05–0.5)
EOSINOPHILS RELATIVE PERCENT: 1.6 % (ref 0–6)
GFR AFRICAN AMERICAN: >60
GFR NON-AFRICAN AMERICAN: 60 ML/MIN/1.73
GLUCOSE BLD-MCNC: 106 MG/DL (ref 74–99)
HCT VFR BLD CALC: 37.8 % (ref 37–54)
HEMOGLOBIN: 12.1 G/DL (ref 12.5–16.5)
IMMATURE GRANULOCYTES #: 0.03 E9/L
IMMATURE GRANULOCYTES %: 0.4 % (ref 0–5)
LYMPHOCYTES ABSOLUTE: 2.9 E9/L (ref 1.5–4)
LYMPHOCYTES RELATIVE PERCENT: 39 % (ref 20–42)
MCH RBC QN AUTO: 30.9 PG (ref 26–35)
MCHC RBC AUTO-ENTMCNC: 32 % (ref 32–34.5)
MCV RBC AUTO: 96.4 FL (ref 80–99.9)
MONOCYTES ABSOLUTE: 0.59 E9/L (ref 0.1–0.95)
MONOCYTES RELATIVE PERCENT: 7.9 % (ref 2–12)
NEUTROPHILS ABSOLUTE: 3.75 E9/L (ref 1.8–7.3)
NEUTROPHILS RELATIVE PERCENT: 50.6 % (ref 43–80)
PDW BLD-RTO: 13.1 FL (ref 11.5–15)
PHOSPHORUS: 3.1 MG/DL (ref 2.5–4.5)
PLATELET # BLD: 217 E9/L (ref 130–450)
PMV BLD AUTO: 8.5 FL (ref 7–12)
POTASSIUM SERPL-SCNC: 4.3 MMOL/L (ref 3.5–5)
RBC # BLD: 3.92 E12/L (ref 3.8–5.8)
SODIUM BLD-SCNC: 143 MMOL/L (ref 132–146)
VITAMIN D 25-HYDROXY: 29 NG/ML (ref 30–100)
WBC # BLD: 7.4 E9/L (ref 4.5–11.5)

## 2022-02-01 PROCEDURE — 85025 COMPLETE CBC W/AUTO DIFF WBC: CPT

## 2022-02-01 PROCEDURE — 82306 VITAMIN D 25 HYDROXY: CPT

## 2022-02-01 PROCEDURE — 80069 RENAL FUNCTION PANEL: CPT

## 2022-02-01 PROCEDURE — 36415 COLL VENOUS BLD VENIPUNCTURE: CPT

## 2022-03-07 ENCOUNTER — PROCEDURE VISIT (OUTPATIENT)
Dept: AUDIOLOGY | Age: 72
End: 2022-03-07
Payer: MEDICARE

## 2022-03-07 DIAGNOSIS — H90.3 SENSORINEURAL HEARING LOSS, BILATERAL: Primary | ICD-10-CM

## 2022-03-07 PROCEDURE — V5140 BEHIND EAR BINAUR HEARING AI: HCPCS | Performed by: AUDIOLOGIST

## 2022-03-07 PROCEDURE — V5160 DISPENSING FEE BINAURAL: HCPCS | Performed by: AUDIOLOGIST

## 2022-03-07 NOTE — PROGRESS NOTES
Patient seen for hearing aid custom mold fitting- mold fit well. Patient reported comfort with fitting    Acoustic parameters were adjusted for molds. Feed back analyzer was done. Patient pleased with fit and sound.   Will call if any issues and/or we will check in with him on 4/19/22 during his ENT appt     BILLING DONE TODAY     Tianna Juarez/[facility]  [address] # [id number]

## 2022-03-18 RX ORDER — ATORVASTATIN CALCIUM 40 MG/1
40 TABLET, FILM COATED ORAL DAILY
Qty: 90 TABLET | Refills: 1 | Status: SHIPPED
Start: 2022-03-18 | End: 2022-09-15

## 2022-03-18 NOTE — TELEPHONE ENCOUNTER
QUESTIONS   Name of Medication? atorvastatin (LIPITOR) 40 MG tablet   Patient-reported dosage and instructions? 1 daily   How many days do you have left? 3   Preferred Pharmacy? [address]   Pharmacy phone number (if available)? [phone]   ---------------------------------------------------------------------------   --------------   CALL BACK INFO   What is the best way for the office to contact you? OK to leave message on   voicemail   Preferred Call Back Phone Number?  [phone]

## 2022-03-21 ENCOUNTER — PROCEDURE VISIT (OUTPATIENT)
Dept: AUDIOLOGY | Age: 72
End: 2022-03-21

## 2022-03-21 DIAGNOSIS — H90.3 SENSORINEURAL HEARING LOSS, BILATERAL: Primary | ICD-10-CM

## 2022-03-21 PROCEDURE — 99999 PR OFFICE/OUTPT VISIT,PROCEDURE ONLY: CPT | Performed by: AUDIOLOGIST

## 2022-03-21 NOTE — PROGRESS NOTES
Patient seen today for hearing aid check. He reported his right hearing aid is not working. Visual inspection revealed cerumen accumulation in wax trap. Changed wax trap and function was restored. Demonstrated how to change wax trap on the left hearing aid and recommended changing wax trap whenever there is a change in function (no sound, quieter than usual). Patient to follow-up with audiology on 4/19/2022 to check fit of molds.      Electronically signed by Tianna Birmingham on 3/22/2022 at 12:24 PM

## 2022-04-19 ENCOUNTER — PROCEDURE VISIT (OUTPATIENT)
Dept: AUDIOLOGY | Age: 72
End: 2022-04-19

## 2022-04-19 ENCOUNTER — OFFICE VISIT (OUTPATIENT)
Dept: ENT CLINIC | Age: 72
End: 2022-04-19
Payer: MEDICARE

## 2022-04-19 VITALS — WEIGHT: 228 LBS | HEIGHT: 68 IN | BODY MASS INDEX: 34.56 KG/M2

## 2022-04-19 DIAGNOSIS — H90.3 SENSORINEURAL HEARING LOSS, BILATERAL: Primary | ICD-10-CM

## 2022-04-19 DIAGNOSIS — H90.3 SENSORINEURAL HEARING LOSS (SNHL) OF BOTH EARS: ICD-10-CM

## 2022-04-19 DIAGNOSIS — H61.23 BILATERAL IMPACTED CERUMEN: Primary | ICD-10-CM

## 2022-04-19 PROCEDURE — 69210 REMOVE IMPACTED EAR WAX UNI: CPT | Performed by: OTOLARYNGOLOGY

## 2022-04-19 PROCEDURE — 99214 OFFICE O/P EST MOD 30 MIN: CPT | Performed by: OTOLARYNGOLOGY

## 2022-04-19 PROCEDURE — 99024 POSTOP FOLLOW-UP VISIT: CPT | Performed by: AUDIOLOGIST

## 2022-04-19 ASSESSMENT — ENCOUNTER SYMPTOMS
VOMITING: 0
SHORTNESS OF BREATH: 0
CHEST TIGHTNESS: 0
ABDOMINAL PAIN: 0
EYE DISCHARGE: 0
COLOR CHANGE: 0
EYES NEGATIVE: 1
DIARRHEA: 0
RESPIRATORY NEGATIVE: 1
GASTROINTESTINAL NEGATIVE: 1
EYE PAIN: 0
APNEA: 0

## 2022-04-19 NOTE — PROGRESS NOTES
Subjective:      Patient ID:  Anna Houston is a 67 y.o. male. HPI:    Patient presents today for recheck of cerumen impaction. Patient wears hearing aids. Does not use any H202. Past Medical History:   Diagnosis Date    Bilateral leg and foot pain     CAD (coronary artery disease)     follows with Dr Ryann Whittaker Hypertension     Nasal mass 11/2020    STEMI (ST elevation myocardial infarction) ([address] 10/23/2018     Past Surgical History:   Procedure Laterality Date    CORONARY ANGIOPLASTY WITH STENT PLACEMENT  10/23/2018    3.5 x 26 Resolute José to mid RCA by Dr. Kailee Garcia 11/16/2020    EXCISION RIGHT NASAL LESION WITH RECONSTRUCTION WITH FROZEN SECTION performed by Francesca Pedro DO at [facility] OR     Family History   Problem Relation Age of Onset    High Blood Pressure Mother     Heart Disease Mother     Dementia Mother     Diabetes Mother     Cancer Father 62        lung, tongue, brain    Heart Attack Sister     Heart Attack Brother      Social History     Socioeconomic History    Marital status:      Spouse name: None    Number of children: None    Years of education: None    Highest education level: None   Occupational History    None   Tobacco Use    Smoking status: Never Smoker    Smokeless tobacco: Never Used   Vaping Use    Vaping Use: Never used   Substance and Sexual Activity    Alcohol use: No     Comment: no alcohol x 50 years    Drug use: No    Sexual activity: Not Currently   Other Topics Concern    None   Social History Narrative    Drinks 2 cups of coffee daily. Social Determinants of Health     Financial Resource Strain: Low Risk     Difficulty of Paying Living Expenses: Not hard at all   Food Insecurity: No Food Insecurity    Worried About Running Out of Food in the Last Year: Never true    Albert of Food in the Last Year: Never true   Transportation Needs:     Lack of Transportation (Medical):  Not on file    Lack of Transportation (Non-Medical): Not on file   Physical Activity:     Days of Exercise per Week: Not on file    Minutes of Exercise per Session: Not on file   Stress:     Feeling of Stress : Not on file   Social Connections:     Frequency of Communication with Friends and Family: Not on file    Frequency of Social Gatherings with Friends and Family: Not on file    Attends Latter day Services: Not on file    Active Member of Clubs or Organizations: Not on file    Attends Club or Organization Meetings: Not on file    Marital Status: Not on file   Intimate Partner Violence:     Fear of Current or Ex-Partner: Not on file    Emotionally Abused: Not on file    Physically Abused: Not on file    Sexually Abused: Not on file   Housing Stability:     Unable to Pay for Housing in the Last Year: Not on file    Number of [place] in the Last Year: Not on file    Unstable Housing in the Last Year: Not on file     No Known Allergies    Review of Systems   Constitutional: Negative. Negative for appetite change. HENT: Positive for hearing loss. Eyes: Negative. Negative for pain, discharge and visual disturbance. Respiratory: Negative. Negative for apnea, chest tightness and shortness of breath. Cardiovascular: Negative. Negative for chest pain, palpitations and leg swelling. Gastrointestinal: Negative. Negative for abdominal pain, diarrhea and vomiting. Endocrine: Negative for cold intolerance, heat intolerance and polydipsia. Genitourinary: Negative. Negative for dysuria, flank pain and hematuria. Musculoskeletal: Negative. Negative for arthralgias, gait problem and neck pain. Skin: Negative. Negative for color change, pallor and rash. Allergic/Immunologic: Negative for environmental allergies, food allergies and immunocompromised state. Neurological: Negative. Negative for dizziness, numbness and headaches. Hematological: Negative for adenopathy.    Psychiatric/Behavioral: Negative. Negative for behavioral problems and hallucinations. All other systems reviewed and are negative. Objective: There were no vitals filed for this visit. Physical Exam  Vitals and nursing note reviewed. Constitutional:       Appearance: He is well-developed. HENT:      Head: Normocephalic and atraumatic. Right Ear: Tympanic membrane, ear canal and external ear normal. Decreased hearing noted. There is impacted cerumen. Left Ear: Tympanic membrane, ear canal and external ear normal. Decreased hearing noted. There is impacted cerumen. Nose: Nose normal. No signs of injury or laceration. Left Nostril: No septal hematoma. Right Turbinates: Not enlarged. Right Sinus: No maxillary sinus tenderness. Left Sinus: No maxillary sinus tenderness. Comments: Incision healing well. Mouth/Throat:      Pharynx: Uvula midline. Eyes:      Conjunctiva/sclera: Conjunctivae normal.      Pupils: Pupils are equal, round, and reactive to light. Cardiovascular:      Rate and Rhythm: Normal rate and regular rhythm. Heart sounds: Normal heart sounds. Pulmonary:      Effort: Pulmonary effort is normal.      Breath sounds: Normal breath sounds. Abdominal:      General: Bowel sounds are normal.      Palpations: Abdomen is soft. Musculoskeletal:      Cervical back: Normal range of motion and neck supple. Skin:     General: Skin is warm and dry. Neurological:      Mental Status: He is alert and oriented to person, place, and time. Cerumen removal      Auditory canal(s) both ears completely obstructed with cerumen. A microscope was not used . Cerumen was gently removed using soft plastic curette. Tympanic membranes are intact following the procedure. Auditory canals appear normal.                  Assessment:       Diagnosis Orders   1. Bilateral impacted cerumen                Plan:      Received hearing aids.   Follow up 6 months for amy Clifton  1950    I have discussed the case, including pertinent history and exam findings with the resident. I have seen and examined the patient and the key elements of the encounter have been performed by me. I agree with the assessment, plan and orders as documented by the  resident              Remainder of medical problems as per  resident note. Patient seen and examined. Agree with above exam, assessment and plan.       Electronically signed by Silver Mcfarlane DO on 5/9/22 at 11:16 AM EDT

## 2022-04-19 NOTE — PROGRESS NOTES
Patient reported hearing aids and molds fit and feel good in his ears. He reported the sound quality sounded weak this morning but he changed the wax trap and they sounded good again. Gave patient a brush and showed him how to brush the molds off every night so he does not have to replace wax trap as often. Patient satisfied with hearing aids and will call as needed.      Electronically signed by Tianna Rae on 4/19/2022 at 11:54 AM

## 2022-04-21 ENCOUNTER — OFFICE VISIT (OUTPATIENT)
Dept: FAMILY MEDICINE CLINIC | Age: 72
End: 2022-04-21
Payer: MEDICARE

## 2022-04-21 VITALS
HEIGHT: 68 IN | TEMPERATURE: 97.2 F | HEART RATE: 67 BPM | BODY MASS INDEX: 34.86 KG/M2 | OXYGEN SATURATION: 95 % | SYSTOLIC BLOOD PRESSURE: 125 MMHG | WEIGHT: 230 LBS | RESPIRATION RATE: 18 BRPM | DIASTOLIC BLOOD PRESSURE: 74 MMHG

## 2022-04-21 DIAGNOSIS — K21.9 GASTROESOPHAGEAL REFLUX DISEASE, UNSPECIFIED WHETHER ESOPHAGITIS PRESENT: ICD-10-CM

## 2022-04-21 DIAGNOSIS — N18.30 STAGE 3 CHRONIC KIDNEY DISEASE, UNSPECIFIED WHETHER STAGE 3A OR 3B CKD (HCC): ICD-10-CM

## 2022-04-21 DIAGNOSIS — I10 ESSENTIAL HYPERTENSION: ICD-10-CM

## 2022-04-21 DIAGNOSIS — R73.9 HYPERGLYCEMIA: Primary | ICD-10-CM

## 2022-04-21 LAB — HBA1C MFR BLD: 6.2 %

## 2022-04-21 PROCEDURE — 99214 OFFICE O/P EST MOD 30 MIN: CPT | Performed by: FAMILY MEDICINE

## 2022-04-21 PROCEDURE — 83036 HEMOGLOBIN GLYCOSYLATED A1C: CPT | Performed by: FAMILY MEDICINE

## 2022-04-21 RX ORDER — OMEPRAZOLE 40 MG/1
40 CAPSULE, DELAYED RELEASE ORAL DAILY
Qty: 90 CAPSULE | Refills: 0 | Status: SHIPPED | OUTPATIENT
Start: 2022-04-21

## 2022-04-21 SDOH — ECONOMIC STABILITY: INCOME INSECURITY: IN THE LAST 12 MONTHS, WAS THERE A TIME WHEN YOU WERE NOT ABLE TO PAY THE MORTGAGE OR RENT ON TIME?: NO

## 2022-04-21 SDOH — HEALTH STABILITY: MENTAL HEALTH: HOW OFTEN DO YOU HAVE A DRINK CONTAINING ALCOHOL?: NEVER

## 2022-04-21 SDOH — ECONOMIC STABILITY: TRANSPORTATION INSECURITY
IN THE PAST 12 MONTHS, HAS THE LACK OF TRANSPORTATION KEPT YOU FROM MEDICAL APPOINTMENTS OR FROM GETTING MEDICATIONS?: NO

## 2022-04-21 SDOH — ECONOMIC STABILITY: FOOD INSECURITY: WITHIN THE PAST 12 MONTHS, THE FOOD YOU BOUGHT JUST DIDN'T LAST AND YOU DIDN'T HAVE MONEY TO GET MORE.: NEVER TRUE

## 2022-04-21 SDOH — SOCIAL STABILITY: SOCIAL NETWORK: HOW OFTEN DO YOU GET TOGETHER WITH FRIENDS OR RELATIVES?: THREE TIMES A WEEK

## 2022-04-21 SDOH — SOCIAL STABILITY: SOCIAL NETWORK: HOW OFTEN DO YOU ATTEND CHURCH OR RELIGIOUS SERVICES?: MORE THAN 4 TIMES PER YEAR

## 2022-04-21 SDOH — SOCIAL STABILITY: SOCIAL INSECURITY: WITHIN THE LAST YEAR, HAVE YOU BEEN AFRAID OF YOUR PARTNER OR EX-PARTNER?: NO

## 2022-04-21 SDOH — HEALTH STABILITY: PHYSICAL HEALTH: ON AVERAGE, HOW MANY DAYS PER WEEK DO YOU ENGAGE IN MODERATE TO STRENUOUS EXERCISE (LIKE A BRISK WALK)?: 0 DAYS

## 2022-04-21 SDOH — SOCIAL STABILITY: SOCIAL NETWORK
DO YOU BELONG TO ANY CLUBS OR ORGANIZATIONS SUCH AS CHURCH GROUPS UNIONS, FRATERNAL OR ATHLETIC GROUPS, OR SCHOOL GROUPS?: NO

## 2022-04-21 SDOH — HEALTH STABILITY: MENTAL HEALTH
STRESS IS WHEN SOMEONE FEELS TENSE, NERVOUS, ANXIOUS, OR CAN'T SLEEP AT NIGHT BECAUSE THEIR MIND IS TROUBLED. HOW STRESSED ARE YOU?: NOT AT ALL

## 2022-04-21 SDOH — SOCIAL STABILITY: SOCIAL INSECURITY
WITHIN THE LAST YEAR, HAVE YOU BEEN KICKED, HIT, SLAPPED, OR OTHERWISE PHYSICALLY HURT BY YOUR PARTNER OR EX-PARTNER?: NO

## 2022-04-21 SDOH — ECONOMIC STABILITY: HOUSING INSECURITY: IN THE LAST 12 MONTHS, HOW MANY PLACES HAVE YOU LIVED?: 1

## 2022-04-21 SDOH — SOCIAL STABILITY: SOCIAL INSECURITY
WITHIN THE LAST YEAR, HAVE TO BEEN RAPED OR FORCED TO HAVE ANY KIND OF SEXUAL ACTIVITY BY YOUR PARTNER OR EX-PARTNER?: NO

## 2022-04-21 SDOH — SOCIAL STABILITY: SOCIAL NETWORK: HOW OFTEN DO YOU ATTENT MEETINGS OF THE CLUB OR ORGANIZATION YOU BELONG TO?: NEVER

## 2022-04-21 SDOH — ECONOMIC STABILITY: FOOD INSECURITY: WITHIN THE PAST 12 MONTHS, YOU WORRIED THAT YOUR FOOD WOULD RUN OUT BEFORE YOU GOT MONEY TO BUY MORE.: NEVER TRUE

## 2022-04-21 SDOH — SOCIAL STABILITY: SOCIAL NETWORK
IN A TYPICAL WEEK, HOW MANY TIMES DO YOU TALK ON THE PHONE WITH FAMILY, FRIENDS, OR NEIGHBORS?: MORE THAN THREE TIMES A WEEK

## 2022-04-21 SDOH — SOCIAL STABILITY: SOCIAL NETWORK: ARE YOU MARRIED, WIDOWED, DIVORCED, SEPARATED, NEVER MARRIED, OR LIVING WITH A PARTNER?: DIVORCED

## 2022-04-21 SDOH — SOCIAL STABILITY: SOCIAL INSECURITY: WITHIN THE LAST YEAR, HAVE YOU BEEN HUMILIATED OR EMOTIONALLY ABUSED IN OTHER WAYS BY YOUR PARTNER OR EX-PARTNER?: NO

## 2022-04-21 SDOH — ECONOMIC STABILITY: INCOME INSECURITY: HOW HARD IS IT FOR YOU TO PAY FOR THE VERY BASICS LIKE FOOD, HOUSING, MEDICAL CARE, AND HEATING?: NOT HARD AT ALL

## 2022-04-21 SDOH — ECONOMIC STABILITY: TRANSPORTATION INSECURITY
IN THE PAST 12 MONTHS, HAS LACK OF TRANSPORTATION KEPT YOU FROM MEETINGS, WORK, OR FROM GETTING THINGS NEEDED FOR DAILY LIVING?: NO

## 2022-04-21 SDOH — HEALTH STABILITY: PHYSICAL HEALTH: ON AVERAGE, HOW MANY MINUTES DO YOU ENGAGE IN EXERCISE AT THIS LEVEL?: 0 MIN

## 2022-04-21 NOTE — PROGRESS NOTES
FM Progress Note    Subjective:   GERD. Nighttime acid reflux resolved with twice daily dosing of omeprazole. CKD. Controlled. Follows with nephrology. Lisinopril. Hypertension. Controlled. Amlodipine and lisinopril and Imdur and metoprolol. Asymptomatic. Hyperglycemia. Lab Results   Component Value Date    LABA1C 6.2 04/21/2022       May be moving to [place] the first of the year. Joints do not hurt him down there. Health Maintenance Due   Topic Date Due    DTaP/Tdap/Td vaccine (1 - Tdap) Never done    Shingles Vaccine (1 of 2) Never done    Pneumococcal 65+ years Vaccine (1 - PCV) Never done    Lipids  02/08/2022           Objective:   /74 (Site: Left Upper Arm, Position: Sitting, Cuff Size: Large Adult)   Pulse 67   Temp 97.2 °F (36.2 °C) (Temporal)   Resp 18   Ht 5' 8\" (1.727 m)   Wt 230 lb (104.3 kg)   SpO2 95%   BMI 34.97 kg/m²   General appearance: NAD, alert and interacting appropriately  HEENT: NCAT, PERRLA, EOMI   Resp: CTAB, no WRC  CVS: RRR, no MRG  Abdomen: BS +, SNDNT  Extremities: No clubbing, cyanosis, or edema. Warm. Dry. I have reviewed this patient's previous records. I have reviewed this patient's labs. I have reviewed this patient's medications. Assessment/Plan:    Thor Olguin was seen today for hypertension and gastroesophageal reflux. Diagnoses and all orders for this visit:    Hyperglycemia  -     POCT glycosylated hemoglobin (Hb A1C)    Gastroesophageal reflux disease, unspecified whether esophagitis present  -     omeprazole (PRILOSEC) 40 MG delayed release capsule; Take 1 capsule by mouth daily    Stage 3 chronic kidney disease, unspecified whether stage 3a or 3b CKD ([address]    Essential hypertension    Continue to monitor hyperglycemia. Low carbohydrate meal plan discussed with patient. Omeprazole daily. Can increase to twice a day in the future if needed, but ultimate goal would be coming off this medicine.   Continue to monitor CKD.  Plans follow-up with nephrology soon. Continue present management hypertension. Patient Instructions         Patient Education        Type 2 Diabetes: Care Instructions  Your Care Instructions     Type 2 diabetes is a disease that develops when the body's tissues cannot use insulin properly. Over time, the pancreas cannot make enough insulin. Insulin is a hormone that helps the body's cells use sugar (glucose) for energy. Radha Casperer helps the body store extra sugar in muscle, fat, and liver cells. Without insulin, the sugar cannot get into the cells to do its work. It stays in the blood instead. This can cause high blood sugar levels. A person has diabetes when the blood sugar stays too high too much of the time. Over time, diabetes can lead to diseases of the heart, blood vessels, nerves, kidneys, andeyes. You may be able to control your blood sugar by losing weight, eating a healthy diet, and getting daily exercise. You may also have to take insulin or otherdiabetes medicine. Follow-up care is a key part of your treatment and safety. Be sure to make and go to all appointments. Call your doctor if you are having problems. It's also a good idea to know your test results and keep a list ofthe medicines you take. How can you care for yourself at home?  Keep your blood sugar at a target level (which you set with your doctor). ? Carbohydrate--the body's main source of fuel--affects blood sugar more than any other nutrient. Carbohydrate is in fruits, vegetables, milk, and yogurt. It also is in breads, cereals, vegetables such as potatoes and corn, and sugary foods such as candy and cakes. Follow your meal plan to know how much carbohydrate to eat at each meal and snack. ? Aim for 30 minutes of exercise on most, preferably all, days of the week. Walking is a good choice. You also may want to do other activities, such as running, swimming, cycling, or playing tennis or team sports.  Try to do muscle-strengthening exercises at least 2 times a week. ? Take your medicines exactly as prescribed. Call your doctor if you think you are having a problem with your medicine. You will get more details on the specific medicines your doctor prescribes.  Check your blood sugar as often as your doctor recommends. It is important to keep track of any symptoms you have, such as low blood sugar. Also tell your doctor if you have any changes in your activities, diet, or insulin use.  Talk to your doctor before you start taking aspirin every day. Aspirin can help certain people lower their risk of a heart attack or stroke. But taking aspirin isn't right for everyone, because it can cause serious bleeding.  Do not smoke. If you need help quitting, talk to your doctor about stop-smoking programs and medicines. These can increase your chances of quitting for good.  Keep your cholesterol and blood pressure at normal levels. You may need to take one or more medicines to reach your goals. Take them exactly as directed. Do not stop or change a medicine without talking to your doctor first.  When should you call for help? Call 911 anytime you think you may need emergency care. For example, call if:     You passed out (lost consciousness), or you suddenly become very sleepy or confused. (You may have very low blood sugar.)   Call your doctor now or seek immediate medical care if:     Your blood sugar is 300 mg/dL or is higher than the level your doctor has set for you.      You have symptoms of low blood sugar, such as:  ? Sweating. ? Feeling nervous, shaky, and weak. ? Extreme hunger and slight nausea. ? Dizziness and headache.  ? Blurred vision. ? Confusion. Watch closely for changes in your health, and be sure to contact your doctor if:     You often have problems controlling your blood sugar.      You have symptoms of long-term diabetes problems, such as:  ? New vision changes.   ? New pain, numbness, or tingling in your hands or feet. ? Skin problems. Where can you learn more? Go to [email or web address] and sign in to your Ev[employer] account. Enter C553 in the [facility] box to learn more about \"Type 2 Diabetes: Care Instructions. \"     If you do not have an account, please click on the \"Sign Up Now\" link. Current as of: July 28, 2021               Content Version: 13.2  © 7573-9148 [employer], [employer]. Care instructions adapted under license by [facility] ([facility]). If you have questions about a medical condition or this instruction, always ask your healthcare professional. [address] any warranty or liability for your use of this information. Please call every pharmacy around and ask if they provide the shingles vaccine and how much it costs. If it's affordable please get it and let me know when you've received it. Please call every pharmacy around and ask if they provide the Tdap (tetanus, diphtheria, acellular pertussis [whooping cough]) vaccine and how much it costs. If it's affordable please get it and let me know when you've received it. Return for Medicare annual wellness visit.       Electronically signed by Corrina Casey MD on 4/21/2022 at 9:07 AM

## 2022-04-21 NOTE — PATIENT INSTRUCTIONS
Patient Education        Type 2 Diabetes: Care Instructions  Your Care Instructions     Type 2 diabetes is a disease that develops when the body's tissues cannot use insulin properly. Over time, the pancreas cannot make enough insulin. Insulin is a hormone that helps the body's cells use sugar (glucose) for energy. Prem Saundersel helps the body store extra sugar in muscle, fat, and liver cells. Without insulin, the sugar cannot get into the cells to do its work. It stays in the blood instead. This can cause high blood sugar levels. A person has diabetes when the blood sugar stays too high too much of the time. Over time, diabetes can lead to diseases of the heart, blood vessels, nerves, kidneys, andeyes. You may be able to control your blood sugar by losing weight, eating a healthy diet, and getting daily exercise. You may also have to take insulin or otherdiabetes medicine. Follow-up care is a key part of your treatment and safety. Be sure to make and go to all appointments. Call your doctor if you are having problems. It's also a good idea to know your test results and keep a list ofthe medicines you take. How can you care for yourself at home?  Keep your blood sugar at a target level (which you set with your doctor). ? Carbohydrate--the body's main source of fuel--affects blood sugar more than any other nutrient. Carbohydrate is in fruits, vegetables, milk, and yogurt. It also is in breads, cereals, vegetables such as potatoes and corn, and sugary foods such as candy and cakes. Follow your meal plan to know how much carbohydrate to eat at each meal and snack. ? Aim for 30 minutes of exercise on most, preferably all, days of the week. Walking is a good choice. You also may want to do other activities, such as running, swimming, cycling, or playing tennis or team sports. Try to do muscle-strengthening exercises at least 2 times a week. ? Take your medicines exactly as prescribed.  Call your doctor if you think you are having a problem with your medicine. You will get more details on the specific medicines your doctor prescribes.  Check your blood sugar as often as your doctor recommends. It is important to keep track of any symptoms you have, such as low blood sugar. Also tell your doctor if you have any changes in your activities, diet, or insulin use.  Talk to your doctor before you start taking aspirin every day. Aspirin can help certain people lower their risk of a heart attack or stroke. But taking aspirin isn't right for everyone, because it can cause serious bleeding.  Do not smoke. If you need help quitting, talk to your doctor about stop-smoking programs and medicines. These can increase your chances of quitting for good.  Keep your cholesterol and blood pressure at normal levels. You may need to take one or more medicines to reach your goals. Take them exactly as directed. Do not stop or change a medicine without talking to your doctor first.  When should you call for help? Call 911 anytime you think you may need emergency care. For example, call if:     You passed out (lost consciousness), or you suddenly become very sleepy or confused. (You may have very low blood sugar.)   Call your doctor now or seek immediate medical care if:     Your blood sugar is 300 mg/dL or is higher than the level your doctor has set for you.      You have symptoms of low blood sugar, such as:  ? Sweating. ? Feeling nervous, shaky, and weak. ? Extreme hunger and slight nausea. ? Dizziness and headache.  ? Blurred vision. ? Confusion. Watch closely for changes in your health, and be sure to contact your doctor if:     You often have problems controlling your blood sugar.      You have symptoms of long-term diabetes problems, such as:  ? New vision changes. ? New pain, numbness, or tingling in your hands or feet. ? Skin problems. Where can you learn more? Go to [email or web address] and sign in to your [email or web address] account. Enter C553 in the [facility] box to learn more about \"Type 2 Diabetes: Care Instructions. \"     If you do not have an account, please click on the \"Sign Up Now\" link. Current as of: July 28, 2021               Content Version: 13.2  © 8006-0150 Healthwise, [employer]. Care instructions adapted under license by [facility] ([facility]). If you have questions about a medical condition or this instruction, always ask your healthcare professional. [address] any warranty or liability for your use of this information. Please call every pharmacy around and ask if they provide the shingles vaccine and how much it costs. If it's affordable please get it and let me know when you've received it. Please call every pharmacy around and ask if they provide the Tdap (tetanus, diphtheria, acellular pertussis [whooping cough]) vaccine and how much it costs. If it's affordable please get it and let me know when you've received it.

## 2022-07-20 ENCOUNTER — TELEPHONE (OUTPATIENT)
Dept: FAMILY MEDICINE CLINIC | Age: 72
End: 2022-07-20

## 2022-07-20 DIAGNOSIS — I10 ESSENTIAL HYPERTENSION: ICD-10-CM

## 2022-07-20 DIAGNOSIS — K21.9 GASTROESOPHAGEAL REFLUX DISEASE, UNSPECIFIED WHETHER ESOPHAGITIS PRESENT: ICD-10-CM

## 2022-07-20 DIAGNOSIS — I73.9 CLAUDICATION (HCC): ICD-10-CM

## 2022-07-20 DIAGNOSIS — I25.10 CORONARY ARTERY DISEASE INVOLVING NATIVE CORONARY ARTERY OF NATIVE HEART WITHOUT ANGINA PECTORIS: ICD-10-CM

## 2022-07-20 RX ORDER — AMLODIPINE BESYLATE 10 MG/1
10 TABLET ORAL DAILY
Qty: 90 TABLET | Refills: 1 | Status: SHIPPED | OUTPATIENT
Start: 2022-07-20

## 2022-07-20 RX ORDER — METOPROLOL SUCCINATE 50 MG/1
50 TABLET, EXTENDED RELEASE ORAL 2 TIMES DAILY
Qty: 180 TABLET | Refills: 1 | Status: SHIPPED | OUTPATIENT
Start: 2022-07-20

## 2022-07-20 RX ORDER — FAMOTIDINE 20 MG/1
20 TABLET, FILM COATED ORAL 2 TIMES DAILY
Qty: 180 TABLET | Refills: 1 | Status: SHIPPED | OUTPATIENT
Start: 2022-07-20

## 2022-07-20 RX ORDER — ISOSORBIDE MONONITRATE 30 MG/1
30 TABLET, EXTENDED RELEASE ORAL DAILY
Qty: 90 TABLET | Refills: 1 | Status: SHIPPED | OUTPATIENT
Start: 2022-07-20

## 2022-07-20 RX ORDER — LISINOPRIL 20 MG/1
20 TABLET ORAL DAILY
Qty: 90 TABLET | Refills: 1 | Status: SHIPPED | OUTPATIENT
Start: 2022-07-20

## 2022-07-20 RX ORDER — CLOPIDOGREL BISULFATE 75 MG/1
75 TABLET ORAL DAILY
Qty: 90 TABLET | Refills: 1 | Status: SHIPPED | OUTPATIENT
Start: 2022-07-20

## 2022-07-20 NOTE — TELEPHONE ENCOUNTER
amLODIPine (NORVASC) 10 MG tablet   clopidogrel (PLAVIX) 75 MG tablet   famotidine (PEPCID) 20 MG tablet   isosorbide mononitrate (IMDUR) 30 MG extended release tablet   lisinopril (PRINIVIL;ZESTRIL) 20 MG tablet   metoprolol succinate (TOPROL XL) 50 MG extended release tablet   Pt needs refills, please send to [facility] [phone]

## 2022-07-27 ENCOUNTER — HOSPITAL ENCOUNTER (OUTPATIENT)
Age: 72
Discharge: HOME | End: 2022-07-27
Payer: MEDICARE

## 2022-07-27 LAB
ALBUMIN SERPL-MCNC: 4.1 G/DL (ref 3.5–5.2)
ANION GAP SERPL CALCULATED.3IONS-SCNC: 10 MMOL/L (ref 7–16)
BASOPHILS ABSOLUTE: 0.02 E9/L (ref 0–0.2)
BASOPHILS RELATIVE PERCENT: 0.3 % (ref 0–2)
BUN BLDV-MCNC: 24 MG/DL (ref 6–23)
CALCIUM SERPL-MCNC: 9.5 MG/DL (ref 8.6–10.2)
CHLORIDE BLD-SCNC: 107 MMOL/L (ref 98–107)
CO2: 23 MMOL/L (ref 22–29)
CREAT SERPL-MCNC: 1.5 MG/DL (ref 0.7–1.2)
EOSINOPHILS ABSOLUTE: 0.12 E9/L (ref 0.05–0.5)
EOSINOPHILS RELATIVE PERCENT: 1.6 % (ref 0–6)
GFR AFRICAN AMERICAN: 56
GFR NON-AFRICAN AMERICAN: 46 ML/MIN/1.73
GLUCOSE BLD-MCNC: 113 MG/DL (ref 74–99)
HCT VFR BLD CALC: 37.7 % (ref 37–54)
HEMOGLOBIN: 12.3 G/DL (ref 12.5–16.5)
IMMATURE GRANULOCYTES #: 0.02 E9/L
IMMATURE GRANULOCYTES %: 0.3 % (ref 0–5)
LYMPHOCYTES ABSOLUTE: 2.74 E9/L (ref 1.5–4)
LYMPHOCYTES RELATIVE PERCENT: 36.7 % (ref 20–42)
MCH RBC QN AUTO: 31.3 PG (ref 26–35)
MCHC RBC AUTO-ENTMCNC: 32.6 % (ref 32–34.5)
MCV RBC AUTO: 95.9 FL (ref 80–99.9)
MONOCYTES ABSOLUTE: 0.61 E9/L (ref 0.1–0.95)
MONOCYTES RELATIVE PERCENT: 8.2 % (ref 2–12)
NEUTROPHILS ABSOLUTE: 3.96 E9/L (ref 1.8–7.3)
NEUTROPHILS RELATIVE PERCENT: 52.9 % (ref 43–80)
PDW BLD-RTO: 12.2 FL (ref 11.5–15)
PHOSPHORUS: 3.1 MG/DL (ref 2.5–4.5)
PLATELET # BLD: 206 E9/L (ref 130–450)
PMV BLD AUTO: 9.1 FL (ref 7–12)
POTASSIUM SERPL-SCNC: 4.7 MMOL/L (ref 3.5–5)
RBC # BLD: 3.93 E12/L (ref 3.8–5.8)
SODIUM BLD-SCNC: 140 MMOL/L (ref 132–146)
VITAMIN D 25-HYDROXY: 40 NG/ML (ref 30–100)
WBC # BLD: 7.5 E9/L (ref 4.5–11.5)

## 2022-07-27 PROCEDURE — 80069 RENAL FUNCTION PANEL: CPT

## 2022-07-27 PROCEDURE — 85025 COMPLETE CBC W/AUTO DIFF WBC: CPT

## 2022-07-27 PROCEDURE — 36415 COLL VENOUS BLD VENIPUNCTURE: CPT

## 2022-07-27 PROCEDURE — 82306 VITAMIN D 25 HYDROXY: CPT

## 2022-09-15 RX ORDER — ATORVASTATIN CALCIUM 40 MG/1
TABLET, FILM COATED ORAL
Qty: 90 TABLET | Refills: 0 | Status: SHIPPED | OUTPATIENT
Start: 2022-09-15

## 2022-10-18 ENCOUNTER — OFFICE VISIT (OUTPATIENT)
Dept: FAMILY MEDICINE CLINIC | Age: 72
End: 2022-10-18
Payer: MEDICARE

## 2022-10-18 VITALS
WEIGHT: 227.2 LBS | DIASTOLIC BLOOD PRESSURE: 81 MMHG | TEMPERATURE: 97 F | HEART RATE: 73 BPM | RESPIRATION RATE: 20 BRPM | HEIGHT: 68 IN | BODY MASS INDEX: 34.43 KG/M2 | SYSTOLIC BLOOD PRESSURE: 144 MMHG | OXYGEN SATURATION: 96 %

## 2022-10-18 DIAGNOSIS — I50.22 CHRONIC SYSTOLIC (CONGESTIVE) HEART FAILURE (HCC): ICD-10-CM

## 2022-10-18 DIAGNOSIS — N18.30 STAGE 3 CHRONIC KIDNEY DISEASE, UNSPECIFIED WHETHER STAGE 3A OR 3B CKD (HCC): ICD-10-CM

## 2022-10-18 DIAGNOSIS — I10 ESSENTIAL HYPERTENSION: Primary | ICD-10-CM

## 2022-10-18 DIAGNOSIS — K21.9 GASTROESOPHAGEAL REFLUX DISEASE, UNSPECIFIED WHETHER ESOPHAGITIS PRESENT: ICD-10-CM

## 2022-10-18 DIAGNOSIS — Z23 NEED FOR INFLUENZA VACCINATION: ICD-10-CM

## 2022-10-18 PROCEDURE — 1124F ACP DISCUSS-NO DSCNMKR DOCD: CPT | Performed by: FAMILY MEDICINE

## 2022-10-18 PROCEDURE — 99214 OFFICE O/P EST MOD 30 MIN: CPT | Performed by: FAMILY MEDICINE

## 2022-10-18 NOTE — PATIENT INSTRUCTIONS
Continue the twice per day pepcid. Take omeprazole twice a day for 2 weeks, then go back to as needed with omeprazole. Continue the lisinopril for the blood pressure and kidney protection. Please call every pharmacy around and ask if they provide the shingles vaccine and how much it costs. If it's affordable please get it and let me know when you've received it. Please call every pharmacy around and ask if they provide the Tdap (tetanus, diphtheria, acellular pertussis [whooping cough]) vaccine and how much it costs. If it's affordable please get it and let me know when you've received it.

## 2022-10-18 NOTE — PROGRESS NOTES
FM Progress Note    Subjective:   GERD. Nighttime acid reflux resolved with twice daily dosing of pepcid. CKD. Controlled. Follows with nephrology Dr. Isabel Ortiz. Lisinopril. Hypertension. Controlled at home. Amlodipine and lisinopril and Imdur and metoprolol. Asymptomatic. HF. No cp or sob. Sees Dr. Greg Adamson. Hyperglycemia. Lab Results   Component Value Date    LABA1C 6.2 04/21/2022       May be moving down the street next November 2/2 high rent. Health Maintenance Due   Topic Date Due    DTaP/Tdap/Td vaccine (1 - Tdap) Never done    Shingles vaccine (1 of 2) Never done    Pneumococcal 65+ years Vaccine (1 - PCV) Never done    Lipids  02/08/2022    COVID-19 Vaccine (4 - Booster for Pfizer series) 05/21/2022    Flu vaccine (1) Never done           Objective:   BP (!) 144/81   Pulse 73   Temp 97 °F (36.1 °C) (Temporal)   Resp 20   Ht 5' 8\" (1.727 m)   Wt 227 lb 3.2 oz (103.1 kg)   SpO2 96%   BMI 34.55 kg/m²   General appearance: NAD, alert and interacting appropriately  HEENT: NCAT, PERRLA, EOMI   Resp: CTAB, no WRC  CVS: RRR, no MRG  Abdomen: BS +, SNDNT  Extremities: No clubbing, cyanosis, or edema. Warm. Dry. I have reviewed this patient's previous records. I have reviewed this patient's labs. I have reviewed this patient's medications. Assessment/Plan:    Hyacinth Cabrera was seen today for hyperglycemia. Diagnoses and all orders for this visit:    Essential hypertension    Chronic systolic (congestive) heart failure    Gastroesophageal reflux disease, unspecified whether esophagitis present    Stage 3 chronic kidney disease, unspecified whether stage 3a or 3b CKD ([facility] [address]    Need for influenza vaccination  -     Influenza, FLUAD, (age 72 y+), IM, Preservative Free, 0.5 mL          Patient Instructions   Continue the twice per day pepcid. Take omeprazole twice a day for 2 weeks, then go back to as needed with omeprazole.      Continue the lisinopril for the blood pressure and kidney protection. Please call every pharmacy around and ask if they provide the shingles vaccine and how much it costs. If it's affordable please get it and let me know when you've received it. Please call every pharmacy around and ask if they provide the Tdap (tetanus, diphtheria, acellular pertussis [whooping cough]) vaccine and how much it costs. If it's affordable please get it and let me know when you've received it. Return in about 6 months (around 4/18/2023) for Medicare annual wellness visit.       Electronically signed by Renzo Doll MD on 10/18/2022 at 8:31 AM

## 2022-10-25 ENCOUNTER — OFFICE VISIT (OUTPATIENT)
Dept: ENT CLINIC | Age: 72
End: 2022-10-25
Payer: MEDICARE

## 2022-10-25 ENCOUNTER — PROCEDURE VISIT (OUTPATIENT)
Dept: AUDIOLOGY | Age: 72
End: 2022-10-25
Payer: MEDICARE

## 2022-10-25 VITALS
BODY MASS INDEX: 33.8 KG/M2 | WEIGHT: 223 LBS | TEMPERATURE: 97.2 F | DIASTOLIC BLOOD PRESSURE: 74 MMHG | OXYGEN SATURATION: 95 % | HEIGHT: 68 IN | HEART RATE: 70 BPM | SYSTOLIC BLOOD PRESSURE: 132 MMHG

## 2022-10-25 DIAGNOSIS — H90.A32 MIXED CONDUCTIVE AND SENSORINEURAL HEARING LOSS OF LEFT EAR WITH RESTRICTED HEARING OF RIGHT EAR: Primary | ICD-10-CM

## 2022-10-25 DIAGNOSIS — H90.A21 SENSORINEURAL HEARING LOSS (SNHL) OF RIGHT EAR WITH RESTRICTED HEARING OF LEFT EAR: ICD-10-CM

## 2022-10-25 DIAGNOSIS — H90.3 SENSORINEURAL HEARING LOSS (SNHL) OF BOTH EARS: Primary | ICD-10-CM

## 2022-10-25 PROCEDURE — 3074F SYST BP LT 130 MM HG: CPT | Performed by: OTOLARYNGOLOGY

## 2022-10-25 PROCEDURE — 92567 TYMPANOMETRY: CPT | Performed by: AUDIOLOGIST

## 2022-10-25 PROCEDURE — 1124F ACP DISCUSS-NO DSCNMKR DOCD: CPT | Performed by: OTOLARYNGOLOGY

## 2022-10-25 PROCEDURE — 3078F DIAST BP <80 MM HG: CPT | Performed by: OTOLARYNGOLOGY

## 2022-10-25 PROCEDURE — 99214 OFFICE O/P EST MOD 30 MIN: CPT | Performed by: OTOLARYNGOLOGY

## 2022-10-25 PROCEDURE — 92557 COMPREHENSIVE HEARING TEST: CPT | Performed by: AUDIOLOGIST

## 2022-10-25 ASSESSMENT — ENCOUNTER SYMPTOMS
DIARRHEA: 0
ABDOMINAL PAIN: 0
SHORTNESS OF BREATH: 0
EYE PAIN: 0
RESPIRATORY NEGATIVE: 1
EYES NEGATIVE: 1
COLOR CHANGE: 0
APNEA: 0
CHEST TIGHTNESS: 0
EYE DISCHARGE: 0
GASTROINTESTINAL NEGATIVE: 1
VOMITING: 0

## 2022-10-25 NOTE — PROGRESS NOTES
Subjective:      Patient ID:  Tylor Garcia is a 67 y.o. male. HPI:    Patient presents today for recheck. Condition has been present for several years. He has problems hearing in every environment, especially with background noise. Past Medical History:   Diagnosis Date    Bilateral leg and foot pain     CAD (coronary artery disease)     follows with Dr Ian Ferreira    CKD (chronic kidney disease), stage III ([facility] [address] 2/26/2019    Hypertension     Nasal mass 11/2020    STEMI (ST elevation myocardial infarction) ([facility] [address] 10/23/2018     Past Surgical History:   Procedure Laterality Date    CORONARY ANGIOPLASTY WITH STENT PLACEMENT  10/23/2018    3.5 x 26 Resolute José to mid RCA by Dr. Greg Preston 11/16/2020    EXCISION RIGHT NASAL LESION WITH RECONSTRUCTION WITH FROZEN SECTION performed by Jovani Mckeon DO at [facility] OR     Family History   Problem Relation Age of Onset    High Blood Pressure Mother     Heart Disease Mother     Dementia Mother     Diabetes Mother     Cancer Father 62        lung, tongue, brain    Heart Attack Sister     Heart Attack Brother      Social History     Socioeconomic History    Marital status:      Spouse name: None    Number of children: None    Years of education: None    Highest education level: None   Tobacco Use    Smoking status: Never    Smokeless tobacco: Never   Vaping Use    Vaping Use: Never used   Substance and Sexual Activity    Alcohol use: No     Comment: no alcohol x 50 years    Drug use: No    Sexual activity: Not Currently   Social History Narrative    Drinks 2 cups of coffee daily.      Social Determinants of Health     Financial Resource Strain: Low Risk     Difficulty of Paying Living Expenses: Not hard at all   Food Insecurity: No Food Insecurity    Worried About [address] in the Last Year: Never true    [address] in the Last Year: Never true   Transportation Needs: No Transportation Needs    Lack of Transportation (Medical): No    Lack of Transportation (Non-Medical): No   Physical Activity: Inactive    Days of Exercise per Week: 0 days    Minutes of Exercise per Session: 0 min   Stress: No Stress Concern Present    Feeling of Stress : Not at all   Social Connections: Moderately Isolated    Frequency of Communication with Friends and Family: More than three times a week    Frequency of Social Gatherings with Friends and Family: Three times a week    Attends Confucianist Services: More than 4 times per year    Active Member of Clubs or Organizations: No    Attends Club or Organization Meetings: Never    Marital Status:    Intimate Partner Violence: Not At Risk    Fear of Current or Ex-Partner: No    Emotionally Abused: No    Physically Abused: No    Sexually Abused: No   Housing Stability: Unknown    Unable to Pay for Housing in the Last Year: No    Number of Places Lived in the Last Year: 1     No Known Allergies    Review of Systems   Constitutional: Negative. Negative for appetite change. HENT:  Positive for hearing loss. Eyes: Negative. Negative for pain, discharge and visual disturbance. Respiratory: Negative. Negative for apnea, chest tightness and shortness of breath. Cardiovascular: Negative. Negative for chest pain, palpitations and leg swelling. Gastrointestinal: Negative. Negative for abdominal pain, diarrhea and vomiting. Endocrine: Negative for cold intolerance, heat intolerance and polydipsia. Genitourinary: Negative. Negative for dysuria, flank pain and hematuria. Musculoskeletal: Negative. Negative for arthralgias, gait problem and neck pain. Skin: Negative. Negative for color change, pallor and rash. Allergic/Immunologic: Negative for environmental allergies, food allergies and immunocompromised state. Neurological: Negative. Negative for dizziness, numbness and headaches. Hematological:  Negative for adenopathy. Psychiatric/Behavioral: Negative.   Negative for behavioral problems and hallucinations. All other systems reviewed and are negative. Objective:     Vitals:    10/25/22 1018   BP: 132/74   Pulse: 70   Temp: 97.2 °F (36.2 °C)   SpO2: 95%     Physical Exam  Vitals and nursing note reviewed. Constitutional:       Appearance: He is well-developed. HENT:      Head: Normocephalic and atraumatic. Right Ear: Tympanic membrane, ear canal and external ear normal. Decreased hearing noted. There is impacted cerumen. Left Ear: Tympanic membrane, ear canal and external ear normal. Decreased hearing noted. Nose: Nose normal. No signs of injury or laceration. Left Nostril: No septal hematoma. Right Turbinates: Not enlarged. Right Sinus: No maxillary sinus tenderness. Left Sinus: No maxillary sinus tenderness. Comments: Incision healing well. Mouth/Throat:      Pharynx: Uvula midline. Eyes:      Conjunctiva/sclera: Conjunctivae normal.      Pupils: Pupils are equal, round, and reactive to light. Cardiovascular:      Rate and Rhythm: Normal rate and regular rhythm. Heart sounds: Normal heart sounds. Pulmonary:      Effort: Pulmonary effort is normal.      Breath sounds: Normal breath sounds. Abdominal:      General: Bowel sounds are normal.      Palpations: Abdomen is soft. Musculoskeletal:      Cervical back: Normal range of motion and neck supple. Skin:     General: Skin is warm and dry. Neurological:      Mental Status: He is alert and oriented to person, place, and time. Assessment:       Diagnosis Orders   1.  Sensorineural hearing loss (SNHL) of both ears  GILBERT Potts, Audiology, [facility]                 Plan:      Audiogram stable compared to one year prior  Continue hearing aids     Follow up in 12 month for repeat audio and cerumen check    Electronically signed by Jori Villatoro DO on 10/25/2022 at 10:53 AM                         Manuel Torres  1950    I have discussed the case, including pertinent history and exam findings with the resident. I have seen and examined the patient and the key elements of the encounter have been performed by me. I agree with the assessment, plan and orders as documented by the  resident              Remainder of medical problems as per  resident note. Patient seen and examined. Agree with above exam, assessment and plan.       Electronically signed by Tiffani Velazquez DO on 11/2/22 at 11:14 AM EDT

## 2022-10-25 NOTE — PROGRESS NOTES
This patient was referred for audiometric and tympanometric testing by Dr. Reyes Lynn due to hearing loss. Audiometry using pure tone air and bone conduction testing revealed a mild slopping to profound sensorineural hearing loss, in the right ear, and a mild sloping to severe mixed hearing loss in the left. Reliability was good. Speech reception thresholds were in good agreement with the pure tone averages, bilaterally. Speech discrimination scores were excellent in the right ear, and good in the left ear. Tympanometry revealed normal middle ear peak pressure and compliance, bilaterally. The results were reviewed with the patient. Patient was given 1 pack of Phonak CeruStop Wax guards. Recommendations for follow up will be made pending physician consult. Ivonne Mortimer B. A. Audiology Intern.      Electronically signed by Tianna Perez on 10/25/2022 at 1:17 PM

## 2022-12-09 ENCOUNTER — TELEPHONE (OUTPATIENT)
Dept: FAMILY MEDICINE CLINIC | Age: 72
End: 2022-12-09

## 2022-12-12 RX ORDER — ATORVASTATIN CALCIUM 40 MG/1
40 TABLET, FILM COATED ORAL DAILY
Qty: 90 TABLET | Refills: 1 | Status: SHIPPED | OUTPATIENT
Start: 2022-12-12

## 2022-12-16 ENCOUNTER — OFFICE VISIT (OUTPATIENT)
Dept: CARDIOLOGY CLINIC | Age: 72
End: 2022-12-16
Payer: MEDICARE

## 2022-12-16 VITALS
WEIGHT: 220 LBS | RESPIRATION RATE: 18 BRPM | BODY MASS INDEX: 33.34 KG/M2 | HEIGHT: 68 IN | SYSTOLIC BLOOD PRESSURE: 118 MMHG | HEART RATE: 61 BPM | DIASTOLIC BLOOD PRESSURE: 76 MMHG

## 2022-12-16 DIAGNOSIS — Z87.09 H/O ACUTE RESPIRATORY FAILURE: ICD-10-CM

## 2022-12-16 DIAGNOSIS — Z86.39 H/O VITAMIN D DEFICIENCY: ICD-10-CM

## 2022-12-16 DIAGNOSIS — I25.2 HISTORY OF ACUTE INFERIOR WALL MI: ICD-10-CM

## 2022-12-16 DIAGNOSIS — Z86.16 HISTORY OF COVID-19: ICD-10-CM

## 2022-12-16 DIAGNOSIS — Z98.61 HISTORY OF PTCA: ICD-10-CM

## 2022-12-16 DIAGNOSIS — E78.5 DYSLIPIDEMIA: ICD-10-CM

## 2022-12-16 DIAGNOSIS — I25.10 CORONARY ARTERY DISEASE INVOLVING NATIVE CORONARY ARTERY OF NATIVE HEART WITHOUT ANGINA PECTORIS: Primary | ICD-10-CM

## 2022-12-16 DIAGNOSIS — E66.09 NON MORBID OBESITY DUE TO EXCESS CALORIES: ICD-10-CM

## 2022-12-16 DIAGNOSIS — M17.10 ARTHRITIS OF KNEE: ICD-10-CM

## 2022-12-16 DIAGNOSIS — N18.31 STAGE 3A CHRONIC KIDNEY DISEASE (HCC): ICD-10-CM

## 2022-12-16 DIAGNOSIS — R73.03 PREDIABETES: ICD-10-CM

## 2022-12-16 DIAGNOSIS — I73.9 PVD (PERIPHERAL VASCULAR DISEASE) WITH CLAUDICATION (HCC): ICD-10-CM

## 2022-12-16 DIAGNOSIS — E79.0 HYPERURICEMIA: ICD-10-CM

## 2022-12-16 DIAGNOSIS — H90.3 SENSORINEURAL HEARING LOSS (SNHL) OF BOTH EARS: ICD-10-CM

## 2022-12-16 DIAGNOSIS — I10 ESSENTIAL HYPERTENSION: ICD-10-CM

## 2022-12-16 PROCEDURE — 1124F ACP DISCUSS-NO DSCNMKR DOCD: CPT | Performed by: INTERNAL MEDICINE

## 2022-12-16 PROCEDURE — 93000 ELECTROCARDIOGRAM COMPLETE: CPT | Performed by: INTERNAL MEDICINE

## 2022-12-16 PROCEDURE — 99214 OFFICE O/P EST MOD 30 MIN: CPT | Performed by: INTERNAL MEDICINE

## 2022-12-16 PROCEDURE — 3074F SYST BP LT 130 MM HG: CPT | Performed by: INTERNAL MEDICINE

## 2022-12-16 PROCEDURE — 3078F DIAST BP <80 MM HG: CPT | Performed by: INTERNAL MEDICINE

## 2022-12-19 ENCOUNTER — HOSPITAL ENCOUNTER (OUTPATIENT)
Age: 72
Discharge: HOME | End: 2022-12-19
Payer: MEDICARE

## 2022-12-19 DIAGNOSIS — I25.10 CORONARY ARTERY DISEASE INVOLVING NATIVE CORONARY ARTERY OF NATIVE HEART WITHOUT ANGINA PECTORIS: ICD-10-CM

## 2022-12-19 DIAGNOSIS — R73.03 PREDIABETES: ICD-10-CM

## 2022-12-19 DIAGNOSIS — E78.5 DYSLIPIDEMIA: ICD-10-CM

## 2022-12-19 LAB
CHOLESTEROL, FASTING: 93 MG/DL (ref 0–199)
HDLC SERPL-MCNC: 40 MG/DL
LDL CHOLESTEROL CALCULATED: 26 MG/DL (ref 0–99)
TRIGLYCERIDE, FASTING: 135 MG/DL (ref 0–149)
VLDLC SERPL CALC-MCNC: 27 MG/DL

## 2022-12-19 PROCEDURE — 80061 LIPID PANEL: CPT

## 2022-12-19 PROCEDURE — 36415 COLL VENOUS BLD VENIPUNCTURE: CPT

## 2022-12-19 NOTE — PROGRESS NOTES
OFFICE VISIT        PRIMARY CARE PHYSICIAN:    Ktaie Mcgowan MD       ALLERGIES / SENSITIVITIES:    No Known Allergies       REVIEWED MEDICATIONS:      Current Outpatient Medications:     atorvastatin (LIPITOR) 40 MG tablet, Take 1 tablet by mouth daily, Disp: 90 tablet, Rfl: 1    amLODIPine (NORVASC) 10 MG tablet, Take 1 tablet by mouth in the morning., Disp: 90 tablet, Rfl: 1    clopidogrel (PLAVIX) 75 MG tablet, Take 1 tablet by mouth in the morning., Disp: 90 tablet, Rfl: 1    famotidine (PEPCID) 20 MG tablet, Take 1 tablet by mouth in the morning and 1 tablet before bedtime. , Disp: 180 tablet, Rfl: 1    isosorbide mononitrate (IMDUR) 30 MG extended release tablet, Take 1 tablet by mouth in the morning., Disp: 90 tablet, Rfl: 1    lisinopril (PRINIVIL;ZESTRIL) 20 MG tablet, Take 1 tablet by mouth in the morning., Disp: 90 tablet, Rfl: 1    metoprolol succinate (TOPROL XL) 50 MG extended release tablet, Take 1 tablet by mouth in the morning and 1 tablet in the evening. Take am [facility] 11/16., Disp: 180 tablet, Rfl: 1    Cholecalciferol (VITAMIN D3) 50 MCG (2000 UT) CAPS, Take 1 capsule by mouth daily Ld 11/12, Disp: , Rfl:     aspirin 81 MG chewable tablet, Take 1 tablet by mouth daily (Patient taking differently: Take 81 mg by mouth daily Ld 11/09), Disp: 30 tablet, Rfl: 3    nitroGLYCERIN (NITROSTAT) 0.4 MG SL tablet, Place 1 tablet under the tongue every 5 minutes as needed for Chest pain, Disp: 25 tablet, Rfl: 1      S: REASON FOR VISIT:    Coronary artery disease. Carmen Buitrago is a pleasant, 70-year-old male with cardiovascular history as described below. He tries to remain active. He states that he walks a mile or sometimes more without chest pain or any significant exertional dyspnea. He denies orthopnea, PND's or lower extremity swelling. He denies palpitations, dizziness, presyncope or syncope.  He follows routinely with his PCP and also recently started following with Nephrology (Dr. John Marie) in regards to his chronic kidney disease. He has routine blood tests done, but has not had a lipid profile since 2/2021. He was diagnosed with bilateral hearing loss and wears bilateral hearing aids. He saw Dr. Mery Benitez (ENT) in 10/2022 for cerumen impaction. REVIEW OF SYSTEMS:    CONSTITUTIONAL:  Denies fevers, chills, or night sweats. He reports easy fatigability. HEENT:  Denies any unusual headaches. He has diminished hearing, but denies any recent changes in vision. Denies dysphagia, hoarseness, hemoptysis, hematemesis or epistaxis. ENDOCRINE:  Denies polyphagia, polydipsia or polyuria. Denies heat or cold intolerance. MUSCULOSKELETAL:  He has right knee arthritic pain. He also complains of bilateral lower extremity pains with walking from thighs down to feet. SKIN:  Denies rashes, ulcers or itching. HEME/LYMPH:  Denies any palpable lymph nodes, bleeding or easy bruisability. HEART:  As above. LUNGS:  Denies cough or sputum production. GI: Denies abdominal pain, nausea, vomiting, diarrhea, constipation, rectal bleeding or tarry stools. :  Denies hematuria or dysuria. PSYCHIATRIC:  Denies mood changes, anxiety or depression. NEUROLOGIC:  Denies memory loss, motor weakness, numbness, tingling or tremors. CARDIOVASCULAR HISTORY:    Hypertension  Obesity  Coronary artery disease:  a.   S/P acute ST elevation inferior wall myocardial infarction on 10/23/2018.    b.  cardiac catheterization and angioplasty, 10/23/2018: Left main, no significant disease. LAD around 20% proximal/mid vessel narrowing. 70-80% disease of the 1st diagonal branch and 90% disease of the 2nd diagonal branch. LCX, 80-90% stenosis of the 1st marginal branch and sequential 60, 80 and 60% stenoses in the mid to distal left circumflex before the large terminal lateral branch. RCA, dominant vessel, which is occluded proximally with 60% distal vessel stenosis.   Elevated left ventricular end diastolic pressure consistent with LV dysfunction. Extensively tortuous right innominate artery and thoracic aorta. Successful balloon angioplasty with deployment of the drug-eluting coronary stent to the proximal RCA with post stent deployment dilatation with a larger, noncompliant balloon with very good results. c.  Annie Lunger nuclear stress test done on 3/18/2019 showed a moderate sized, moderately severe fixed defect involving the mid to basal inferior wall extending into the apical inferolateral wall consistent with a prior myocardial infarction with no evidence of significant residual stress-induced ischemia. The study was non gated. 4.  Echocardiogram done on 10/23/2018 was read as being a technically limited study with only off axis apical imaging with grossly normal left ventricular size with no gross regional wall motion abnormality with overall normal ejection fraction. 5.  Peripheral arterial disease of the lower extremities. a. Normal ankle/arm index with good arterial flow to both feet including the toes, based upon the pulse volume recordings. On bilateral SCOTTY studies done on 7/20/2020.    6.  History of acute kidney injury with ace inhibitor therapy. 7.  Prediabetes. 8.  Dyslipidemia. PAST MEDICAL HISTORY:  1. As under cardiovascular history. 2.  Chronic kidney disease. 3.  Vitamin D deficiency. 4.  Right knee arthritis. 5.  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 6.  Hospitalized with Covid 19 pneumonia and hypoxic respiratory failure in 11/2021.    7.  Hard of hearing: Wears hearing aids. FAMILY HISTORY  Father had cancer and mother had dementia, hypertension and diabetes and heart disease in his brother and sister have coronary artery disease. SOCIAL HISTORY:              No tobacco or alcohol or illicit drugs.         O:  COMPLETE PHYSICAL EXAM:      /76   Pulse 61   Resp 18   Ht 5' 8\" (1.727 m)   Wt 220 lb (99.8 kg) BMI 33.45 kg/m²      General:          Well-nourished, well-developed, obese male in no acute distress. HEENT:           Normocephalic and atraumatic head. No JVD. No carotid bruits. Carotid upstrokes normal bilaterally. No thyromegaly. Sclerae not icteric. No xanthelasmas. Mucous membranes moist.  Chest:              Symmetrical and nontender. No deformities. Lungs:             Clear to auscultation bilaterally. Heart:              Normal S1 and S2. No S3 or S4. No murmurs or rubs. Abdomen:        Soft, nontender without organomegaly or masses. No bruits. Normal bowel sounds. Extremities:     No edema. Pulses palpable. Skin:                Warm and dry. Normal turgor. No rashes or ulcers noted. Neurologic:      Oriented x3. No motor or sensory deficits detected. REVIEW OF DIAGNOSTIC TESTS:     Hemoglobin A1C from 4/21/2022:  6.2. Blood tests from 7/27/2022 reviewed in Epic: Hemoglobin 12.3, hematocrit 37.7, vitamin D 25 hydroxy 40, BUN 24, creatinine 1.5, GFR 46, potassium 4.7, sodium 140. EKG done today showed sinus rhythm with voltage criteria for LVH and with possible old inferior wall myocardial infarction. ASSESSMENT / DIAGNOSIS:   1. Coronary artery disease:  Clinically stable. 2.  Hypertension:  Adequately controlled. 3.  Chronic kidney disease with history of acute kidney injury. 4.  Prediabetes. 5.  Dyslipidemia. 6.  Peripheral vascular disease with claudication. Lower extremity ankle brachial indices done on 7/20/2020 were reported as showing normal ankle/arm index with good arterial flow to both feet including the toes based upon the pulse volume recordings. 7.  Obesity. 8.  History of vitamin D deficiency. 9.  History of hyperuricemia. 10.  Right knee arthritis. 11.  Covid 19 pneumonia and acute hypoxic respiratory failure in 11/2021 (had been vaccinated).     12. Bilateral sensorineural hearing loss:  Wears hearing aids. TREATMENT PLAN:   Reassure. Continue current cardiac medications. Check lipid profile. Vanessa Mojica was advised to remain active walking for exercise and to follow a heart-healthy, low cholesterol diabetic diet. Follow up with Cardiology in 1  year or on a prn basis. Cornel Robbins.  [facility] [id number]  [phone]

## 2023-01-19 ENCOUNTER — TELEPHONE (OUTPATIENT)
Dept: FAMILY MEDICINE CLINIC | Age: 73
End: 2023-01-19

## 2023-01-19 DIAGNOSIS — I25.10 CORONARY ARTERY DISEASE INVOLVING NATIVE CORONARY ARTERY OF NATIVE HEART WITHOUT ANGINA PECTORIS: ICD-10-CM

## 2023-01-19 DIAGNOSIS — K21.9 GASTROESOPHAGEAL REFLUX DISEASE, UNSPECIFIED WHETHER ESOPHAGITIS PRESENT: ICD-10-CM

## 2023-01-19 DIAGNOSIS — I73.9 CLAUDICATION (HCC): ICD-10-CM

## 2023-01-19 DIAGNOSIS — I10 ESSENTIAL HYPERTENSION: ICD-10-CM

## 2023-01-19 RX ORDER — AMLODIPINE BESYLATE 10 MG/1
10 TABLET ORAL DAILY
Qty: 90 TABLET | Refills: 1 | Status: SHIPPED | OUTPATIENT
Start: 2023-01-19

## 2023-01-19 RX ORDER — FAMOTIDINE 20 MG/1
20 TABLET, FILM COATED ORAL 2 TIMES DAILY
Qty: 180 TABLET | Refills: 1 | Status: SHIPPED | OUTPATIENT
Start: 2023-01-19

## 2023-01-19 RX ORDER — ISOSORBIDE MONONITRATE 30 MG/1
30 TABLET, EXTENDED RELEASE ORAL DAILY
Qty: 90 TABLET | Refills: 1 | Status: SHIPPED | OUTPATIENT
Start: 2023-01-19

## 2023-01-19 RX ORDER — METOPROLOL SUCCINATE 50 MG/1
50 TABLET, EXTENDED RELEASE ORAL 2 TIMES DAILY
Qty: 180 TABLET | Refills: 1 | Status: SHIPPED | OUTPATIENT
Start: 2023-01-19

## 2023-01-19 RX ORDER — LISINOPRIL 20 MG/1
20 TABLET ORAL DAILY
Qty: 90 TABLET | Refills: 1 | Status: SHIPPED | OUTPATIENT
Start: 2023-01-19

## 2023-01-19 RX ORDER — CLOPIDOGREL BISULFATE 75 MG/1
75 TABLET ORAL DAILY
Qty: 90 TABLET | Refills: 1 | Status: SHIPPED | OUTPATIENT
Start: 2023-01-19

## 2023-02-02 ENCOUNTER — HOSPITAL ENCOUNTER (OUTPATIENT)
Age: 73
Discharge: HOME | End: 2023-02-02
Payer: MEDICARE

## 2023-02-02 LAB
ALBUMIN SERPL-MCNC: 4.3 G/DL (ref 3.5–5.2)
ALP BLD-CCNC: 116 U/L (ref 40–129)
ALT SERPL-CCNC: 8 U/L (ref 0–40)
ANION GAP SERPL CALCULATED.3IONS-SCNC: 10 MMOL/L (ref 7–16)
AST SERPL-CCNC: 10 U/L (ref 0–39)
BILIRUB SERPL-MCNC: 0.5 MG/DL (ref 0–1.2)
BUN BLDV-MCNC: 29 MG/DL (ref 6–23)
CALCIUM SERPL-MCNC: 9.7 MG/DL (ref 8.6–10.2)
CHLORIDE BLD-SCNC: 108 MMOL/L (ref 98–107)
CO2: 23 MMOL/L (ref 22–29)
CREAT SERPL-MCNC: 1.5 MG/DL (ref 0.7–1.2)
FERRITIN: 99 NG/ML
GFR SERPL CREATININE-BSD FRML MDRD: 49 ML/MIN/1.73
GLUCOSE BLD-MCNC: 106 MG/DL (ref 74–99)
HCT VFR BLD CALC: 39.2 % (ref 37–54)
HEMOGLOBIN: 13 G/DL (ref 12.5–16.5)
IRON SATURATION: 32 % (ref 20–55)
IRON: 86 MCG/DL (ref 59–158)
MAGNESIUM: 2.1 MG/DL (ref 1.6–2.6)
MCH RBC QN AUTO: 31.3 PG (ref 26–35)
MCHC RBC AUTO-ENTMCNC: 33.2 % (ref 32–34.5)
MCV RBC AUTO: 94.5 FL (ref 80–99.9)
PARATHYROID HORMONE INTACT: 77 PG/ML (ref 15–65)
PDW BLD-RTO: 12.1 FL (ref 11.5–15)
PHOSPHORUS: 2.9 MG/DL (ref 2.5–4.5)
PLATELET # BLD: 231 E9/L (ref 130–450)
PMV BLD AUTO: 9.3 FL (ref 7–12)
POTASSIUM SERPL-SCNC: 4.8 MMOL/L (ref 3.5–5)
RBC # BLD: 4.15 E12/L (ref 3.8–5.8)
SODIUM BLD-SCNC: 141 MMOL/L (ref 132–146)
TOTAL IRON BINDING CAPACITY: 265 MCG/DL (ref 250–450)
TOTAL PROTEIN: 6.8 G/DL (ref 6.4–8.3)
URIC ACID, SERUM: 7.7 MG/DL (ref 3.4–7)
VITAMIN D 25-HYDROXY: 39 NG/ML (ref 30–100)
WBC # BLD: 9 E9/L (ref 4.5–11.5)

## 2023-02-02 PROCEDURE — 82728 ASSAY OF FERRITIN: CPT

## 2023-02-02 PROCEDURE — 83735 ASSAY OF MAGNESIUM: CPT

## 2023-02-02 PROCEDURE — 84550 ASSAY OF BLOOD/URIC ACID: CPT

## 2023-02-02 PROCEDURE — 82306 VITAMIN D 25 HYDROXY: CPT

## 2023-02-02 PROCEDURE — 36415 COLL VENOUS BLD VENIPUNCTURE: CPT

## 2023-02-02 PROCEDURE — 85027 COMPLETE CBC AUTOMATED: CPT

## 2023-02-02 PROCEDURE — 84100 ASSAY OF PHOSPHORUS: CPT

## 2023-02-02 PROCEDURE — 83970 ASSAY OF PARATHORMONE: CPT

## 2023-02-02 PROCEDURE — 80053 COMPREHEN METABOLIC PANEL: CPT

## 2023-02-02 PROCEDURE — 83550 IRON BINDING TEST: CPT

## 2023-02-02 PROCEDURE — 83540 ASSAY OF IRON: CPT

## 2023-03-20 ENCOUNTER — APPOINTMENT (OUTPATIENT)
Dept: CT IMAGING | Age: 73
End: 2023-03-20
Payer: MEDICARE

## 2023-03-20 ENCOUNTER — HOSPITAL ENCOUNTER (EMERGENCY)
Age: 73
Discharge: HOME | End: 2023-03-20
Attending: STUDENT IN AN ORGANIZED HEALTH CARE EDUCATION/TRAINING PROGRAM
Payer: MEDICARE

## 2023-03-20 VITALS
RESPIRATION RATE: 16 BRPM | HEIGHT: 68 IN | WEIGHT: 220 LBS | DIASTOLIC BLOOD PRESSURE: 82 MMHG | SYSTOLIC BLOOD PRESSURE: 146 MMHG | HEART RATE: 82 BPM | BODY MASS INDEX: 33.34 KG/M2 | TEMPERATURE: 97 F | OXYGEN SATURATION: 98 %

## 2023-03-20 DIAGNOSIS — S09.90XA INJURY OF HEAD, INITIAL ENCOUNTER: ICD-10-CM

## 2023-03-20 DIAGNOSIS — S01.81XA FACIAL LACERATION, INITIAL ENCOUNTER: ICD-10-CM
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
## 2023-03-20 PROCEDURE — 12013 RPR F/E/E/N/L/M 2.6-5.0 CM: CPT

## 2023-03-20 PROCEDURE — 6370000000 HC RX 637 (ALT 250 FOR IP): Performed by: STUDENT IN AN ORGANIZED HEALTH CARE EDUCATION/TRAINING PROGRAM

## 2023-03-20 PROCEDURE — 70450 CT HEAD/BRAIN W/O DYE: CPT

## 2023-03-20 PROCEDURE — 72125 CT NECK SPINE W/O DYE: CPT

## 2023-03-20 PROCEDURE — 99284 EMERGENCY DEPT VISIT MOD MDM: CPT

## 2023-03-20 RX ORDER — ACETAMINOPHEN 325 MG/1
650 TABLET ORAL ONCE
Status: COMPLETED | OUTPATIENT
Start: 2023-03-20 | End: 2023-03-20

## 2023-03-20 RX ADMIN — ACETAMINOPHEN 650 MG: 325 TABLET ORAL at 17:11

## 2023-03-20 ASSESSMENT — PAIN - FUNCTIONAL ASSESSMENT: PAIN_FUNCTIONAL_ASSESSMENT: 0-10

## 2023-03-20 ASSESSMENT — PAIN DESCRIPTION - FREQUENCY: FREQUENCY: CONTINUOUS

## 2023-03-20 ASSESSMENT — PAIN DESCRIPTION - ONSET: ONSET: SUDDEN

## 2023-03-20 ASSESSMENT — PAIN SCALES - GENERAL
PAINLEVEL_OUTOF10: 5
PAINLEVEL_OUTOF10: 7

## 2023-03-20 ASSESSMENT — PAIN DESCRIPTION - LOCATION: LOCATION: HEAD

## 2023-03-20 ASSESSMENT — PAIN DESCRIPTION - PAIN TYPE: TYPE: ACUTE PAIN

## 2023-03-20 ASSESSMENT — PAIN DESCRIPTION - DESCRIPTORS: DESCRIPTORS: BURNING;DISCOMFORT

## 2023-03-20 NOTE — ED PROVIDER NOTES
frontal scalp and left supraorbital superficial soft tissue injury. No associated fracture. CT CERVICAL SPINE WO CONTRAST   Final Result   1. There is no acute compression fracture or subluxation of the cervical   spine. 2. Advanced multilevel degenerative disc and degenerative joint disease. 3. Large bridging anterior degenerative endplate spur seen throughout the   entire course of the cervical spine.                      ------------------------- NURSING NOTES AND VITALS REVIEWED ---------------------------   The nursing notes within the ED encounter and vital signs as below have been reviewed by myself  BP (!) 146/82   Pulse 82   Temp 97 °F (36.1 °C) (Oral)   Resp 16   Ht 5' 8\" (1.727 m)   Wt 220 lb (99.8 kg)   SpO2 98%   BMI 33.45 kg/m²     Oxygen Saturation Interpretation: Normal    The patients available past medical records and past encounters were reviewed. ------------------------------ ED COURSE/MEDICAL DECISION MAKING----------------------  Medications   acetaminophen (TYLENOL) tablet 650 mg (650 mg Oral Given 3/20/23 1711)        Laceration Repair Procedure Note    Indication: Laceration    Procedure: The patient was placed in the appropriate position and anesthesia around the lacerations were obtained by infiltration using 1% Lidocaine without epinephrine. The area was then cleansed with Shur-Clens and draped in a sterile fashion. The laceration was closed with 5-0 Ethilon using interrupted sutures. A second laceration was closed with 5-0 Ethilon using interrupted sutures. The wound area was then dressed with a sterile dressing. Minimal debridement was preformed, flaps were aligned. No foreign body was identified. Total repaired wound length: 3.5 cm. Other Items: Suture count: 7    The patient tolerated the procedure well.     Complications: bleeding      Medical Decision Making  Differential included but not limited to brain bleed vs head injury vs fall vs

## 2023-03-29 ENCOUNTER — OFFICE VISIT (OUTPATIENT)
Dept: PRIMARY CARE CLINIC | Age: 73
End: 2023-03-29

## 2023-03-29 VITALS
WEIGHT: 223 LBS | HEIGHT: 68 IN | RESPIRATION RATE: 19 BRPM | SYSTOLIC BLOOD PRESSURE: 133 MMHG | BODY MASS INDEX: 33.8 KG/M2 | HEART RATE: 71 BPM | OXYGEN SATURATION: 98 % | TEMPERATURE: 97.5 F | DIASTOLIC BLOOD PRESSURE: 81 MMHG

## 2023-03-29 DIAGNOSIS — Z48.02 ENCOUNTER FOR REMOVAL OF SUTURES: Primary | ICD-10-CM

## 2023-03-29 NOTE — PROGRESS NOTES
Chief Complaint:   Suture / Staple Removal      History of Present Illness   Source of history provided by:  patient. Denny Buckley is a 68 y.o. old male with a past medical history of:   Past Medical History:   Diagnosis Date    Bilateral leg and foot pain     CAD (coronary artery disease)     follows with Dr Jose Rodrigez    CKD (chronic kidney disease), stage III ([facility] [address] 2/26/2019    Hypertension     Nasal mass 11/2020    STEMI (ST elevation myocardial infarction) ([facility] [address] 10/23/2018     Suture Removal  Patient here for suture removal. he obtained a laceration  to left side of forehead and left upper eyelid  on 3/20/23 . He was seen at ED. He had 7 suture (s). Mechanism of injury: Fall. His  last tetanus  was approximately 3 years ago-per pt- not recorded in records. Pt denies fevers, chills, drainage/redness/edema to lacerations        ROS    Unless otherwise stated in this report or unable to obtain because of the patient's clinical or mental status as evidenced by the medical record, this patients's positive and negative responses for Review of Systems, constitutional, psych, eyes, ENT, cardiovascular, respiratory, gastrointestinal, neurological, genitourinary, musculoskeletal, integument systems and systems related to the presenting problem are either stated in the preceding or were not pertinent or were negative for the symptoms and/or complaints related to the medical problem. Past Surgical History:  has a past surgical history that includes Coronary angioplasty with stent (10/23/2018) and Nose surgery (N/A, 11/16/2020). Social History:  reports that he has never smoked. He has never used smokeless tobacco. He reports that he does not drink alcohol and does not use drugs. Family History: family history includes Cancer (age of onset: 62) in his father; Dementia in his mother; Diabetes in his mother; Heart Attack in his brother and sister; Heart Disease in his mother; High Blood Pressure in his mother.

## 2023-04-20 ENCOUNTER — OFFICE VISIT (OUTPATIENT)
Dept: FAMILY MEDICINE CLINIC | Age: 73
End: 2023-04-20

## 2023-04-20 VITALS
RESPIRATION RATE: 25 BRPM | SYSTOLIC BLOOD PRESSURE: 132 MMHG | TEMPERATURE: 97.8 F | HEIGHT: 68 IN | HEART RATE: 66 BPM | DIASTOLIC BLOOD PRESSURE: 71 MMHG | WEIGHT: 223.2 LBS | BODY MASS INDEX: 33.83 KG/M2 | OXYGEN SATURATION: 97 %

## 2023-04-20 DIAGNOSIS — Z00.00 MEDICARE ANNUAL WELLNESS VISIT, SUBSEQUENT: Primary | ICD-10-CM

## 2023-04-20 SDOH — ECONOMIC STABILITY: HOUSING INSECURITY
IN THE LAST 12 MONTHS, WAS THERE A TIME WHEN YOU DID NOT HAVE A STEADY PLACE TO SLEEP OR SLEPT IN A SHELTER (INCLUDING NOW)?: NO

## 2023-04-20 SDOH — ECONOMIC STABILITY: FOOD INSECURITY: WITHIN THE PAST 12 MONTHS, THE FOOD YOU BOUGHT JUST DIDN'T LAST AND YOU DIDN'T HAVE MONEY TO GET MORE.: NEVER TRUE

## 2023-04-20 SDOH — ECONOMIC STABILITY: FOOD INSECURITY: WITHIN THE PAST 12 MONTHS, YOU WORRIED THAT YOUR FOOD WOULD RUN OUT BEFORE YOU GOT MONEY TO BUY MORE.: NEVER TRUE

## 2023-04-20 SDOH — ECONOMIC STABILITY: INCOME INSECURITY: HOW HARD IS IT FOR YOU TO PAY FOR THE VERY BASICS LIKE FOOD, HOUSING, MEDICAL CARE, AND HEATING?: NOT HARD AT ALL

## 2023-04-20 ASSESSMENT — PATIENT HEALTH QUESTIONNAIRE - PHQ9
1. LITTLE INTEREST OR PLEASURE IN DOING THINGS: 0
2. FEELING DOWN, DEPRESSED OR HOPELESS: 0
SUM OF ALL RESPONSES TO PHQ QUESTIONS 1-9: 0
SUM OF ALL RESPONSES TO PHQ9 QUESTIONS 1 & 2: 0
SUM OF ALL RESPONSES TO PHQ QUESTIONS 1-9: 0

## 2023-04-20 ASSESSMENT — LIFESTYLE VARIABLES
HOW OFTEN DO YOU HAVE A DRINK CONTAINING ALCOHOL: NEVER
HOW MANY STANDARD DRINKS CONTAINING ALCOHOL DO YOU HAVE ON A TYPICAL DAY: PATIENT DOES NOT DRINK

## 2023-04-20 NOTE — PATIENT INSTRUCTIONS
eating hard foods, such as apples. See a dentist regularly. Many experts recommend checkups every 6 months. Keep the dentist up to date on any new medications the person is taking. Encourage a balanced diet that includes whole grains, vegetables, and fruits, and that is low in saturated fat and sodium. Encourage the person you're caring for not to use tobacco products. They can affect dental and general health. Many older adults have a fixed income and feel that they can't afford dental care. But most towns and [facility] have programs in which dentists help older adults by lowering fees. Contact your area's public health offices or  for information about dental care in your area. Using a toothbrush  Older adults with arthritis sometimes have trouble brushing their teeth because they can't easily hold the toothbrush. Their hands and fingers may be stiff, painful, or weak. If this is the case, you can: Offer an electric toothbrush. Enlarge the handle of a non-electric toothbrush by wrapping a sponge, an elastic bandage, or adhesive tape around it. Push the toothbrush handle through a ball made of rubber or soft foam.  Make the handle longer and thicker by taping Popsicle sticks or tongue depressors to it. You may also be able to buy special toothbrushes, toothpaste dispensers, and floss holders. Your doctor may recommend a soft-bristle toothbrush if the person you care for bleeds easily. Bleeding can happen because of a health problem or from certain medicines. A toothpaste for sensitive teeth may help if the person you care for has sensitive teeth. How do you brush and floss someone's teeth? If the person you are caring for has a hard time cleaning their teeth on their own, you may need to brush and floss their teeth for them. It may be easiest to have the person sit and face away from you, and to sit or stand behind them.  That way you can steady their head against your arm as you reach

## 2023-04-20 NOTE — PROGRESS NOTES
NO advanced directive - information provided          Objective   Vitals:    04/20/23 0759 04/20/23 0805   BP: (!) 156/86 132/71   Pulse: 66    Resp: 25    Temp: 97.8 °F (36.6 °C)    SpO2: 97%    Weight: 223 lb 3.2 oz (101.2 kg)    Height: 5' 8\" (1.727 m)       Body mass index is 33.94 kg/m². No Known Allergies  Prior to Visit Medications    Medication Sig Taking? Authorizing Provider   clopidogrel (PLAVIX) 75 MG tablet Take 1 tablet by mouth daily Yes Shawn Miller MD   famotidine (PEPCID) 20 MG tablet Take 1 tablet by mouth 2 times daily Yes Shawn Miller MD   isosorbide mononitrate (IMDUR) 30 MG extended release tablet Take 1 tablet by mouth daily Yes Shawn Miller MD   lisinopril (PRINIVIL;ZESTRIL) 20 MG tablet Take 1 tablet by mouth daily Yes Shawn Miller MD   metoprolol succinate (TOPROL XL) 50 MG extended release tablet Take 1 tablet by mouth in the morning and 1 tablet in the evening. Take am dos 11/16.  Yes Shawn Miller MD   amLODIPine (NORVASC) 10 MG tablet Take 1 tablet by mouth daily Yes Shawn Miller MD   atorvastatin (LIPITOR) 40 MG tablet Take 1 tablet by mouth daily Yes Shawn Miller MD   nitroGLYCERIN (NITROSTAT) 0.4 MG SL tablet Place 1 tablet under the tongue every 5 minutes as needed for Chest pain Yes Shawn Miller MD   Cholecalciferol (VITAMIN D3) 50 MCG (2000 UT) CAPS Take 1 capsule by mouth daily Ld 11/12 Yes Historical Provider, MD   aspirin 81 MG chewable tablet Take 1 tablet by mouth daily  Patient taking differently: Take 1 tablet by mouth daily Ld 11/09 Yes DO Rachna Alexandra (Including outside providers/suppliers regularly involved in providing care):   Patient Care Team:  Shawn Miller MD as PCP - General (Family Medicine)  Shawn Miller MD as PCP - Empaneled Provider  Swati Hurley MD as Consulting Physician (Nephrology)     Reviewed and updated this visit:  Tobacco  Allergies

## 2023-06-26 DIAGNOSIS — I10 ESSENTIAL HYPERTENSION: ICD-10-CM

## 2023-06-26 DIAGNOSIS — K21.9 GASTROESOPHAGEAL REFLUX DISEASE, UNSPECIFIED WHETHER ESOPHAGITIS PRESENT: ICD-10-CM

## 2023-06-26 DIAGNOSIS — I25.10 CORONARY ARTERY DISEASE INVOLVING NATIVE CORONARY ARTERY OF NATIVE HEART WITHOUT ANGINA PECTORIS: ICD-10-CM

## 2023-06-26 DIAGNOSIS — I73.9 CLAUDICATION (HCC): ICD-10-CM

## 2023-06-26 RX ORDER — FAMOTIDINE 20 MG/1
TABLET, FILM COATED ORAL
Qty: 180 TABLET | Refills: 0 | Status: SHIPPED | OUTPATIENT
Start: 2023-06-26

## 2023-06-26 RX ORDER — METOPROLOL SUCCINATE 50 MG/1
TABLET, EXTENDED RELEASE ORAL
Qty: 180 TABLET | Refills: 0 | Status: SHIPPED | OUTPATIENT
Start: 2023-06-26

## 2023-06-26 RX ORDER — LISINOPRIL 20 MG/1
TABLET ORAL
Qty: 90 TABLET | Refills: 0 | Status: SHIPPED | OUTPATIENT
Start: 2023-06-26

## 2023-06-26 RX ORDER — CLOPIDOGREL BISULFATE 75 MG/1
TABLET ORAL
Qty: 90 TABLET | Refills: 0 | Status: SHIPPED | OUTPATIENT
Start: 2023-06-26

## 2023-06-26 RX ORDER — ISOSORBIDE MONONITRATE 30 MG/1
TABLET, EXTENDED RELEASE ORAL
Qty: 90 TABLET | Refills: 0 | Status: SHIPPED | OUTPATIENT
Start: 2023-06-26

## 2023-06-26 RX ORDER — AMLODIPINE BESYLATE 10 MG/1
TABLET ORAL
Qty: 90 TABLET | Refills: 0 | Status: SHIPPED | OUTPATIENT
Start: 2023-06-26

## 2023-07-08 DIAGNOSIS — I73.9 CLAUDICATION (HCC): ICD-10-CM

## 2023-07-08 DIAGNOSIS — I10 ESSENTIAL HYPERTENSION: ICD-10-CM

## 2023-07-08 DIAGNOSIS — I25.10 CORONARY ARTERY DISEASE INVOLVING NATIVE CORONARY ARTERY OF NATIVE HEART WITHOUT ANGINA PECTORIS: ICD-10-CM

## 2023-07-08 DIAGNOSIS — K21.9 GASTROESOPHAGEAL REFLUX DISEASE, UNSPECIFIED WHETHER ESOPHAGITIS PRESENT: ICD-10-CM

## 2023-07-11 RX ORDER — METOPROLOL SUCCINATE 50 MG/1
TABLET, EXTENDED RELEASE ORAL
Qty: 180 TABLET | Refills: 0 | Status: SHIPPED | OUTPATIENT
Start: 2023-07-11

## 2023-07-11 RX ORDER — AMLODIPINE BESYLATE 10 MG/1
TABLET ORAL
Qty: 90 TABLET | Refills: 0 | Status: SHIPPED | OUTPATIENT
Start: 2023-07-11

## 2023-07-11 RX ORDER — ISOSORBIDE MONONITRATE 30 MG/1
TABLET, EXTENDED RELEASE ORAL
Qty: 90 TABLET | Refills: 0 | Status: SHIPPED | OUTPATIENT
Start: 2023-07-11

## 2023-07-11 RX ORDER — LISINOPRIL 20 MG/1
TABLET ORAL
Qty: 90 TABLET | Refills: 0 | Status: SHIPPED | OUTPATIENT
Start: 2023-07-11

## 2023-07-11 RX ORDER — FAMOTIDINE 20 MG/1
TABLET, FILM COATED ORAL
Qty: 180 TABLET | Refills: 0 | Status: SHIPPED | OUTPATIENT
Start: 2023-07-11

## 2023-07-11 RX ORDER — CLOPIDOGREL BISULFATE 75 MG/1
TABLET ORAL
Qty: 90 TABLET | Refills: 0 | Status: SHIPPED | OUTPATIENT
Start: 2023-07-11

## 2023-10-20 ENCOUNTER — OFFICE VISIT (OUTPATIENT)
Dept: FAMILY MEDICINE CLINIC | Age: 73
End: 2023-10-20

## 2023-10-20 VITALS
OXYGEN SATURATION: 99 % | HEART RATE: 76 BPM | RESPIRATION RATE: 20 BRPM | HEIGHT: 68 IN | TEMPERATURE: 97.1 F | DIASTOLIC BLOOD PRESSURE: 74 MMHG | WEIGHT: 220 LBS | SYSTOLIC BLOOD PRESSURE: 130 MMHG | BODY MASS INDEX: 33.34 KG/M2

## 2023-10-20 DIAGNOSIS — I25.10 CORONARY ARTERY DISEASE INVOLVING NATIVE CORONARY ARTERY OF NATIVE HEART WITHOUT ANGINA PECTORIS: ICD-10-CM

## 2023-10-20 DIAGNOSIS — I25.119 ATHEROSCLEROSIS OF NATIVE CORONARY ARTERY OF NATIVE HEART WITH ANGINA PECTORIS (HCC): ICD-10-CM

## 2023-10-20 DIAGNOSIS — Z23 NEED FOR INFLUENZA VACCINATION: Primary | ICD-10-CM

## 2023-10-20 DIAGNOSIS — R73.9 HYPERGLYCEMIA: ICD-10-CM

## 2023-10-20 DIAGNOSIS — N18.31 STAGE 3A CHRONIC KIDNEY DISEASE (HCC): ICD-10-CM

## 2023-10-20 DIAGNOSIS — I73.9 PVD (PERIPHERAL VASCULAR DISEASE) WITH CLAUDICATION (HCC): ICD-10-CM

## 2023-10-20 DIAGNOSIS — I50.22 CHRONIC SYSTOLIC (CONGESTIVE) HEART FAILURE (HCC): ICD-10-CM

## 2023-10-20 DIAGNOSIS — Z23 NEED FOR PNEUMOCOCCAL VACCINATION: ICD-10-CM

## 2023-10-20 LAB — HBA1C MFR BLD: 6.4 %

## 2023-10-20 RX ORDER — NITROGLYCERIN 0.4 MG/1
0.4 TABLET SUBLINGUAL EVERY 5 MIN PRN
Qty: 25 TABLET | Refills: 1 | Status: SHIPPED | OUTPATIENT
Start: 2023-10-20

## 2023-10-25 ENCOUNTER — OFFICE VISIT (OUTPATIENT)
Dept: ENT CLINIC | Age: 73
End: 2023-10-25
Payer: MEDICARE

## 2023-10-25 ENCOUNTER — PROCEDURE VISIT (OUTPATIENT)
Dept: AUDIOLOGY | Age: 73
End: 2023-10-25

## 2023-10-25 VITALS
DIASTOLIC BLOOD PRESSURE: 90 MMHG | BODY MASS INDEX: 33.34 KG/M2 | HEART RATE: 61 BPM | OXYGEN SATURATION: 97 % | HEIGHT: 68 IN | WEIGHT: 220 LBS | SYSTOLIC BLOOD PRESSURE: 141 MMHG

## 2023-10-25 DIAGNOSIS — H90.A21 SENSORINEURAL HEARING LOSS (SNHL) OF RIGHT EAR WITH RESTRICTED HEARING OF LEFT EAR: Primary | ICD-10-CM

## 2023-10-25 DIAGNOSIS — H90.A32 MIXED CONDUCTIVE AND SENSORINEURAL HEARING LOSS OF LEFT EAR WITH RESTRICTED HEARING OF RIGHT EAR: ICD-10-CM

## 2023-10-25 DIAGNOSIS — H61.23 CERUMEN DEBRIS ON TYMPANIC MEMBRANE OF BOTH EARS: Primary | ICD-10-CM

## 2023-10-25 PROCEDURE — 1124F ACP DISCUSS-NO DSCNMKR DOCD: CPT | Performed by: OTOLARYNGOLOGY

## 2023-10-25 PROCEDURE — 3078F DIAST BP <80 MM HG: CPT | Performed by: OTOLARYNGOLOGY

## 2023-10-25 PROCEDURE — 99024 POSTOP FOLLOW-UP VISIT: CPT | Performed by: AUDIOLOGIST

## 2023-10-25 PROCEDURE — 99213 OFFICE O/P EST LOW 20 MIN: CPT | Performed by: OTOLARYNGOLOGY

## 2023-10-25 PROCEDURE — 3074F SYST BP LT 130 MM HG: CPT | Performed by: OTOLARYNGOLOGY

## 2023-10-25 ASSESSMENT — ENCOUNTER SYMPTOMS
RESPIRATORY NEGATIVE: 1
RHINORRHEA: 0
GASTROINTESTINAL NEGATIVE: 1
SINUS PAIN: 0
EYES NEGATIVE: 1

## 2023-10-25 NOTE — PROGRESS NOTES
Subjective:      Patient ID:  Sebastián Thompson is a 68 y.o. male. HPI:    Cerumen Impaction  Patient presents for 1 year cerumen disimpaction. There is a prior history of cerumen impaction. The patient was not using ear drops to loosen wax immediately prior to this visit. Past Medical History:   Diagnosis Date    Bilateral leg and foot pain     CAD (coronary artery disease)     follows with Dr Angelito Pond    CKD (chronic kidney disease), stage III ([address]) 2/26/2019    Hypertension     Nasal mass 11/2020    STEMI (ST elevation myocardial infarction) ([address]) 10/23/2018     Past Surgical History:   Procedure Laterality Date    CORONARY ANGIOPLASTY WITH STENT PLACEMENT  10/23/2018    3.5 x 26 Resolute José to mid RCA by Dr. Gaby Curry 11/16/2020    EXCISION RIGHT NASAL LESION WITH RECONSTRUCTION WITH FROZEN SECTION performed by Ilir Gibbs DO at [facility] OR     Family History   Problem Relation Age of Onset    High Blood Pressure Mother     Heart Disease Mother     Dementia Mother     Diabetes Mother     Cancer Father 62        lung, tongue, brain    Heart Attack Sister     Heart Attack Brother      Social History     Socioeconomic History    Marital status:      Spouse name: None    Number of children: None    Years of education: None    Highest education level: None   Tobacco Use    Smoking status: Never    Smokeless tobacco: Never   Vaping Use    Vaping Use: Never used   Substance and Sexual Activity    Alcohol use: No     Comment: no alcohol x 50 years    Drug use: No    Sexual activity: Not Currently   Social History Narrative    Drinks 2 cups of coffee daily.      Social Determinants of Health     Financial Resource Strain: Low Risk  (4/20/2023)    Overall Financial Resource Strain (CARDIA)     Difficulty of Paying Living Expenses: Not hard at all   Food Insecurity: No Food Insecurity (4/20/2023)    Hunger Vital Sign     Worried About Running Out of Food in the Last Year: Never true

## 2023-10-25 NOTE — PROGRESS NOTES
Patient here for an ENT appointment. Patient is due for an annual hearing check. Patient has other appointments and does not wish to due hearing test at this time. Scheduled him for 11/13/23 230 pm.    Patient brought his speaker wires that he was originally fit with. He was currently wearing his custom molds. He doesn't like to custom any more and only got those due to covid masks and now wants them switched back. Changed him back to domes. Gave him the custom cshells to keep as back up. Patient is in medium vented domes bilaterally. Hearing aids were cleaned and checked. Hearing aids were reprogrammed to domes and feedback analyzer was run. Domes do not work well with francisco locks. He agreed to take francisco locks off and see if the domes keep hearing aids in well enough. He took francisco locks with him.      It was discussed with the patient that he is no longer in warranty for his hearing aids and today would be a courtesy no charge appt but that moving forward, supplies and appointments would be a charge  $70 for reprogramming etc.  $20 for quick clean and check    (Told patient he could come for quick francisco lock replacement at no charge if he decides he needs those back on )           Tianna Stevens/[facility]  [id number] [facility] # [id number]

## 2023-11-02 ENCOUNTER — FOLLOWUP TELEPHONE ENCOUNTER (OUTPATIENT)
Dept: AUDIOLOGY | Age: 73
End: 2023-11-02

## 2023-11-02 NOTE — TELEPHONE ENCOUNTER
Patient thought he had received a Robo Call message from Pre-service to confirm appointment for tomorrow. Pre-Service called this office to speak with patient as she could find no appointments for tomorrow. Verified his appointment on 11/13/23 for hearing [place] at [place]. That is what had written down. He thinks he may have been listening to old message, but wanted to be sure.      Electronically signed by Violet Velez on 11/2/2023 at 9:36 AM

## 2023-11-13 ENCOUNTER — PROCEDURE VISIT (OUTPATIENT)
Dept: AUDIOLOGY | Age: 73
End: 2023-11-13
Payer: MEDICARE

## 2023-11-13 DIAGNOSIS — H90.3 SENSORINEURAL HEARING LOSS (SNHL) OF BOTH EARS: Primary | ICD-10-CM

## 2023-11-13 PROCEDURE — 92567 TYMPANOMETRY: CPT

## 2023-11-13 PROCEDURE — 92557 COMPREHENSIVE HEARING TEST: CPT

## 2023-11-13 NOTE — PROGRESS NOTES
Dr Tanna Cruz contacted per perfect serve for pulm Detail Level: Detailed Depth Of Biopsy: dermis Was A Bandage Applied: Yes Size Of Lesion In Cm: 0 Anticipated Plan (Based On Presumed Biopsy Results): If +, 15 minute surgery Biopsy Type: H and E Biopsy Method: 15 blade Anesthesia Type: 1% lidocaine with 1:100,000 epinephrine Anesthesia Volume In Cc: 0.5 Hemostasis: Aluminum Chloride Wound Care: Vaseline Dressing: Band-Aid Destruction After The Procedure: No Type Of Destruction Used: Curettage Curettage Text: The wound bed was treated with curettage after the biopsy was performed. Cryotherapy Text: The wound bed was treated with cryotherapy after the biopsy was performed. Electrodesiccation Text: The wound bed was treated with electrodesiccation after the biopsy was performed. Electrodesiccation And Curettage Text: The wound bed was treated with electrodesiccation and curettage after the biopsy was performed. Silver Nitrate Text: The wound bed was treated with silver nitrate after the biopsy was performed. Lab: 6 Lab Facility: 3 Path Notes (To The Dermatopathologist): No prev path Consent: Written consent was obtained and risks were reviewed including but not limited to scarring, infection, bleeding, scabbing, incomplete removal, nerve damage and allergy to anesthesia. Post-Care Instructions: POST CARE: \\n- Your bandage should remain dry until the following morning. \\n- Clean the site each morning with soap and water. Apply Vaseline and a fresh bandaid each day until the site heals. Notification Instructions: Patient instructed to call the office if not contacted with biopsy results within 2 weeks. Billing Type: Third-Party Bill Information: Selecting Yes will display possible errors in your note based on the variables you have selected. This validation is only offered as a suggestion for you. PLEASE NOTE THAT THE VALIDATION TEXT WILL BE REMOVED WHEN YOU FINALIZE YOUR NOTE. IF YOU WANT TO FAX A PRELIMINARY NOTE YOU WILL NEED TO TOGGLE THIS TO 'NO' IF YOU DO NOT WANT IT IN YOUR FAXED NOTE.

## 2023-11-13 NOTE — PROGRESS NOTES
This patient was referred for audiometric and tympanometric testing by Dr. Author Aguiar due to hearing loss. Patient reported he is unsure if his hearing has changed since last year's hearing test.    Audiometry using pure tone air and bone conduction testing revealed a mild to profound sensorineural hearing loss, in the right ear. The left ear revealed essentially mild sloping to severe sensorineural hearing loss. Reliability was good. Speech reception thresholds were in good agreement with the pure tone averages, bilaterally. Speech discrimination scores were excellent, bilaterally. Asymmetries noted across frequencies, left ear worse. Tympanometry revealed normal middle ear peak pressure and compliance, bilaterally. The results were reviewed with the patient. Recommendations for follow up will be made pending physician consult. Patient stated that he would like his custom molds to be back on his hearing aids due to vented domes falling out of his ears all the time. Put custom molds back on hearing aids, cleaned and checked, ran feedback test on both ears. Patient satisfied with comfort and volume. Patient also given 1 pack of cerustop wax guards for custom molds. Patient to call if any problems arise.      Tianna Aponte/[facility]  [place] [id number]  Electronically signed by Tianna Aponte on 11/13/2023 at 2:35 PM

## 2023-12-01 RX ORDER — ATORVASTATIN CALCIUM 40 MG/1
40 TABLET, FILM COATED ORAL DAILY
Qty: 90 TABLET | Refills: 1 | Status: SHIPPED | OUTPATIENT
Start: 2023-12-01

## 2024-01-30 ENCOUNTER — HOSPITAL ENCOUNTER (OUTPATIENT)
Age: 74
Discharge: HOME | End: 2024-01-30
Payer: MEDICARE

## 2024-01-30 LAB
25(OH)D3 SERPL-MCNC: 41.6 NG/ML (ref 30–100)
ALBUMIN SERPL-MCNC: 4.1 G/DL (ref 3.5–5.2)
ALP SERPL-CCNC: 110 U/L (ref 40–129)
ALT SERPL-CCNC: 6 U/L (ref 0–40)
ANION GAP SERPL CALCULATED.3IONS-SCNC: 10 MMOL/L (ref 7–16)
AST SERPL-CCNC: 10 U/L (ref 0–39)
BASOPHILS # BLD: 0.03 K/UL (ref 0–0.2)
BASOPHILS NFR BLD: 0 % (ref 0–2)
BILIRUB SERPL-MCNC: 0.4 MG/DL (ref 0–1.2)
BUN SERPL-MCNC: 22 MG/DL (ref 6–23)
CALCIUM SERPL-MCNC: 9.7 MG/DL (ref 8.6–10.2)
CHLORIDE SERPL-SCNC: 108 MMOL/L (ref 98–107)
CO2 SERPL-SCNC: 24 MMOL/L (ref 22–29)
CREAT SERPL-MCNC: 1.3 MG/DL (ref 0.7–1.2)
EOSINOPHIL # BLD: 0.29 K/UL (ref 0.05–0.5)
EOSINOPHILS RELATIVE PERCENT: 4 % (ref 0–6)
ERYTHROCYTE [DISTWIDTH] IN BLOOD BY AUTOMATED COUNT: 12.3 % (ref 11.5–15)
FERRITIN SERPL-MCNC: 76 NG/ML
GFR SERPL CREATININE-BSD FRML MDRD: 59 ML/MIN/1.73M2
GLUCOSE SERPL-MCNC: 104 MG/DL (ref 74–99)
HCT VFR BLD AUTO: 37.5 % (ref 37–54)
HGB BLD-MCNC: 12.3 G/DL (ref 12.5–16.5)
IMM GRANULOCYTES # BLD AUTO: 0.03 K/UL (ref 0–0.58)
IMM GRANULOCYTES NFR BLD: 0 % (ref 0–5)
IRON SATN MFR SERPL: 37 % (ref 20–55)
IRON SERPL-MCNC: 97 UG/DL (ref 59–158)
LYMPHOCYTES NFR BLD: 2.52 K/UL (ref 1.5–4)
LYMPHOCYTES RELATIVE PERCENT: 31 % (ref 20–42)
MAGNESIUM SERPL-MCNC: 2 MG/DL (ref 1.6–2.6)
MCH RBC QN AUTO: 30.6 PG (ref 26–35)
MCHC RBC AUTO-ENTMCNC: 32.8 G/DL (ref 32–34.5)
MCV RBC AUTO: 93.3 FL (ref 80–99.9)
MONOCYTES NFR BLD: 0.64 K/UL (ref 0.1–0.95)
MONOCYTES NFR BLD: 8 % (ref 2–12)
NEUTROPHILS NFR BLD: 57 % (ref 43–80)
NEUTS SEG NFR BLD: 4.62 K/UL (ref 1.8–7.3)
PHOSPHATE SERPL-MCNC: 3 MG/DL (ref 2.5–4.5)
PLATELET # BLD AUTO: 221 K/UL (ref 130–450)
PMV BLD AUTO: 9.1 FL (ref 7–12)
POTASSIUM SERPL-SCNC: 4.4 MMOL/L (ref 3.5–5)
PROT SERPL-MCNC: 6.8 G/DL (ref 6.4–8.3)
PTH-INTACT SERPL-MCNC: 69.4 PG/ML (ref 15–65)
RBC # BLD AUTO: 4.02 M/UL (ref 3.8–5.8)
SODIUM SERPL-SCNC: 142 MMOL/L (ref 132–146)
TIBC SERPL-MCNC: 262 UG/DL (ref 250–450)
URATE SERPL-MCNC: 5.9 MG/DL (ref 3.4–7)
WBC OTHER # BLD: 8.1 K/UL (ref 4.5–11.5)

## 2024-01-30 PROCEDURE — 84550 ASSAY OF BLOOD/URIC ACID: CPT

## 2024-01-30 PROCEDURE — 85025 COMPLETE CBC W/AUTO DIFF WBC: CPT

## 2024-01-30 PROCEDURE — 84100 ASSAY OF PHOSPHORUS: CPT

## 2024-01-30 PROCEDURE — 83970 ASSAY OF PARATHORMONE: CPT

## 2024-01-30 PROCEDURE — 82728 ASSAY OF FERRITIN: CPT

## 2024-01-30 PROCEDURE — 36415 COLL VENOUS BLD VENIPUNCTURE: CPT

## 2024-01-30 PROCEDURE — 83540 ASSAY OF IRON: CPT

## 2024-01-30 PROCEDURE — 82306 VITAMIN D 25 HYDROXY: CPT

## 2024-01-30 PROCEDURE — 83550 IRON BINDING TEST: CPT

## 2024-01-30 PROCEDURE — 80053 COMPREHEN METABOLIC PANEL: CPT

## 2024-01-30 PROCEDURE — 83735 ASSAY OF MAGNESIUM: CPT

## 2024-03-13 DIAGNOSIS — I73.9 CLAUDICATION (HCC): ICD-10-CM

## 2024-03-13 DIAGNOSIS — K21.9 GASTROESOPHAGEAL REFLUX DISEASE, UNSPECIFIED WHETHER ESOPHAGITIS PRESENT: ICD-10-CM

## 2024-03-13 DIAGNOSIS — I10 ESSENTIAL HYPERTENSION: ICD-10-CM

## 2024-03-13 DIAGNOSIS — I25.10 CORONARY ARTERY DISEASE INVOLVING NATIVE CORONARY ARTERY OF NATIVE HEART WITHOUT ANGINA PECTORIS: ICD-10-CM

## 2024-03-13 RX ORDER — ISOSORBIDE MONONITRATE 30 MG/1
TABLET, EXTENDED RELEASE ORAL
Qty: 90 TABLET | Refills: 1 | Status: SHIPPED | OUTPATIENT
Start: 2024-03-13

## 2024-03-13 RX ORDER — FAMOTIDINE 20 MG/1
TABLET, FILM COATED ORAL
Qty: 180 TABLET | Refills: 1 | Status: SHIPPED | OUTPATIENT
Start: 2024-03-13

## 2024-03-13 RX ORDER — METOPROLOL SUCCINATE 50 MG/1
TABLET, EXTENDED RELEASE ORAL
Qty: 180 TABLET | Refills: 1 | Status: SHIPPED | OUTPATIENT
Start: 2024-03-13

## 2024-03-13 RX ORDER — LISINOPRIL 20 MG/1
TABLET ORAL
Qty: 90 TABLET | Refills: 1 | Status: SHIPPED | OUTPATIENT
Start: 2024-03-13

## 2024-03-13 RX ORDER — CLOPIDOGREL BISULFATE 75 MG/1
TABLET ORAL
Qty: 90 TABLET | Refills: 1 | Status: SHIPPED | OUTPATIENT
Start: 2024-03-13

## 2024-03-14 DIAGNOSIS — I10 ESSENTIAL HYPERTENSION: ICD-10-CM

## 2024-03-14 RX ORDER — AMLODIPINE BESYLATE 10 MG/1
TABLET ORAL
Qty: 90 TABLET | Refills: 1 | Status: SHIPPED | OUTPATIENT
Start: 2024-03-14

## 2024-04-23 ENCOUNTER — OFFICE VISIT (OUTPATIENT)
Dept: FAMILY MEDICINE CLINIC | Age: 74
End: 2024-04-23
Payer: MEDICARE

## 2024-04-23 VITALS
TEMPERATURE: 97 F | OXYGEN SATURATION: 95 % | WEIGHT: 217 LBS | HEIGHT: 68 IN | HEART RATE: 70 BPM | BODY MASS INDEX: 32.89 KG/M2 | DIASTOLIC BLOOD PRESSURE: 72 MMHG | SYSTOLIC BLOOD PRESSURE: 138 MMHG

## 2024-04-23 DIAGNOSIS — I73.9 PVD (PERIPHERAL VASCULAR DISEASE) WITH CLAUDICATION (HCC): ICD-10-CM

## 2024-04-23 DIAGNOSIS — Z12.11 SCREEN FOR COLON CANCER: ICD-10-CM

## 2024-04-23 DIAGNOSIS — G89.29 CHRONIC KNEE PAIN, UNSPECIFIED LATERALITY: ICD-10-CM

## 2024-04-23 DIAGNOSIS — I50.22 CHRONIC SYSTOLIC (CONGESTIVE) HEART FAILURE (HCC): ICD-10-CM

## 2024-04-23 DIAGNOSIS — M54.9 CHRONIC BACK PAIN, UNSPECIFIED BACK LOCATION, UNSPECIFIED BACK PAIN LATERALITY: ICD-10-CM

## 2024-04-23 DIAGNOSIS — G89.29 CHRONIC BACK PAIN, UNSPECIFIED BACK LOCATION, UNSPECIFIED BACK PAIN LATERALITY: ICD-10-CM

## 2024-04-23 DIAGNOSIS — M25.569 CHRONIC KNEE PAIN, UNSPECIFIED LATERALITY: ICD-10-CM

## 2024-04-23 DIAGNOSIS — N18.31 STAGE 3A CHRONIC KIDNEY DISEASE (HCC): ICD-10-CM

## 2024-04-23 DIAGNOSIS — Z00.00 MEDICARE ANNUAL WELLNESS VISIT, SUBSEQUENT: Primary | ICD-10-CM

## 2024-04-23 DIAGNOSIS — I25.10 CORONARY ARTERY DISEASE INVOLVING NATIVE CORONARY ARTERY OF NATIVE HEART WITHOUT ANGINA PECTORIS: ICD-10-CM

## 2024-04-23 PROBLEM — I20.9 ANGINA PECTORIS, UNSPECIFIED (HCC): Status: RESOLVED | Noted: 2022-01-20 | Resolved: 2024-04-23

## 2024-04-23 PROBLEM — I25.119 ATHEROSCLEROTIC HEART DISEASE OF NATIVE CORONARY ARTERY WITH UNSPECIFIED ANGINA PECTORIS (HCC): Status: RESOLVED | Noted: 2022-01-20 | Resolved: 2024-04-23

## 2024-04-23 PROCEDURE — G0439 PPPS, SUBSEQ VISIT: HCPCS | Performed by: FAMILY MEDICINE

## 2024-04-23 PROCEDURE — 1124F ACP DISCUSS-NO DSCNMKR DOCD: CPT | Performed by: FAMILY MEDICINE

## 2024-04-23 PROCEDURE — 3078F DIAST BP <80 MM HG: CPT | Performed by: FAMILY MEDICINE

## 2024-04-23 PROCEDURE — 3075F SYST BP GE 130 - 139MM HG: CPT | Performed by: FAMILY MEDICINE

## 2024-04-23 SDOH — ECONOMIC STABILITY: FOOD INSECURITY: WITHIN THE PAST 12 MONTHS, YOU WORRIED THAT YOUR FOOD WOULD RUN OUT BEFORE YOU GOT MONEY TO BUY MORE.: NEVER TRUE

## 2024-04-23 SDOH — ECONOMIC STABILITY: INCOME INSECURITY: HOW HARD IS IT FOR YOU TO PAY FOR THE VERY BASICS LIKE FOOD, HOUSING, MEDICAL CARE, AND HEATING?: NOT HARD AT ALL

## 2024-04-23 SDOH — ECONOMIC STABILITY: FOOD INSECURITY: WITHIN THE PAST 12 MONTHS, THE FOOD YOU BOUGHT JUST DIDN'T LAST AND YOU DIDN'T HAVE MONEY TO GET MORE.: NEVER TRUE

## 2024-04-23 ASSESSMENT — PATIENT HEALTH QUESTIONNAIRE - PHQ9
SUM OF ALL RESPONSES TO PHQ9 QUESTIONS 1 & 2: 0
2. FEELING DOWN, DEPRESSED OR HOPELESS: NOT AT ALL
SUM OF ALL RESPONSES TO PHQ QUESTIONS 1-9: 0
1. LITTLE INTEREST OR PLEASURE IN DOING THINGS: NOT AT ALL
SUM OF ALL RESPONSES TO PHQ QUESTIONS 1-9: 0

## 2024-04-23 NOTE — PATIENT INSTRUCTIONS
warranty or liability for your use of this information.      Personalized Preventive Plan for Raffy Oseguera - 4/23/2024  Medicare offers a range of preventive health benefits. Some of the tests and screenings are paid in full while other may be subject to a deductible, co-insurance, and/or copay.    Some of these benefits include a comprehensive review of your medical history including lifestyle, illnesses that may run in your family, and various assessments and screenings as appropriate.    After reviewing your medical record and screening and assessments performed today your provider may have ordered immunizations, labs, imaging, and/or referrals for you.  A list of these orders (if applicable) as well as your Preventive Care list are included within your After Visit Summary for your review.    Other Preventive Recommendations:    A preventive eye exam performed by an eye specialist is recommended every 1-2 years to screen for glaucoma; cataracts, macular degeneration, and other eye disorders.  A preventive dental visit is recommended every 6 months.  Try to get at least 150 minutes of exercise per week or 10,000 steps per day on a pedometer .  Order or download the FREE \"Exercise & Physical Activity: Your Everyday Guide\" from [employer]. Call 1-949.960.7832 or search [employer] online.  You need 1349-0797 mg of calcium and 8999-0113 IU of vitamin D per day. It is possible to meet your calcium requirement with diet alone, but a vitamin D supplement is usually necessary to meet this goal.  When exposed to the sun, use a sunscreen that protects against both UVA and UVB radiation with an SPF of 30 or greater. Reapply every 2 to 3 hours or after sweating, drying off with a towel, or swimming.  Always wear a seat belt when traveling in a car. Always wear a helmet when riding a bicycle or motorcycle.

## 2024-04-23 NOTE — PROGRESS NOTES
Medicare Annual Wellness Visit    Raffy Oseguera is here for No chief complaint on file.    Assessment & Plan   Medicare annual wellness visit, subsequent  Chronic knee pain, unspecified laterality  -     [facility]; Starting Tue 4/23/2024, Disp-1 each, R-0, PrintCannot walk 200 feet without great difficulty. 5 years please  -     diclofenac sodium (VOLTAREN) 1 % GEL; Apply 2 g topically 2 times daily, Topical, 2 TIMES DAILY Starting Tue 4/23/2024, Disp-150 g, R-1, Normal  Coronary artery disease involving native coronary artery of native heart without angina pectoris  -     Handicap [facility]; Starting Tue 4/23/2024, Disp-1 each, R-0, PrintCannot walk 200 feet without great difficulty. 5 years please  Chronic systolic (congestive) heart failure (HCC)  -     Handicap [facility]; Starting Tue 4/23/2024, Disp-1 each, R-0, PrintCannot walk 200 feet without great difficulty. 5 years please  PVD (peripheral vascular disease) with claudication (HCC)  -     Handicap [facility]; Starting Tue 4/23/2024, Disp-1 each, R-0, PrintCannot walk 200 feet without great difficulty. 5 years please  Stage 3a chronic kidney disease (HCC)  Chronic back pain, unspecified back location, unspecified back pain laterality  -     diclofenac sodium (VOLTAREN) 1 % GEL; Apply 2 g topically 2 times daily, Topical, 2 TIMES DAILY Starting Tue 4/23/2024, Disp-150 g, R-1, Normal  Screen for colon cancer  -     Cologuard (Fecal DNA Colorectal Cancer Screening)    Recommendations for Preventive Services Due: see orders and patient instructions/AVS.  Recommended screening schedule for the next 5-10 years is provided to the patient in written form: see Patient Instructions/AVS.     Return for Medicare Annual Wellness Visit in 1 year.     Subjective       Patient's complete Health Risk Assessment and screening values have been reviewed and are found in Flowsheets. The following problems were reviewed today and where indicated follow up

## 2024-05-03 LAB — NONINV COLON CA DNA+OCC BLD SCRN STL QL: NEGATIVE

## 2024-05-06 ENCOUNTER — TELEPHONE (OUTPATIENT)
Dept: FAMILY MEDICINE CLINIC | Age: 74
End: 2024-05-06

## 2024-05-23 RX ORDER — ATORVASTATIN CALCIUM 40 MG/1
40 TABLET, FILM COATED ORAL DAILY
Qty: 90 TABLET | Refills: 0 | Status: SHIPPED | OUTPATIENT
Start: 2024-05-23

## 2024-08-19 RX ORDER — ATORVASTATIN CALCIUM 40 MG/1
40 TABLET, FILM COATED ORAL DAILY
Qty: 90 TABLET | Refills: 0 | Status: SHIPPED | OUTPATIENT
Start: 2024-08-19

## 2024-09-01 DIAGNOSIS — I10 ESSENTIAL HYPERTENSION: ICD-10-CM

## 2024-09-01 DIAGNOSIS — I73.9 CLAUDICATION (HCC): ICD-10-CM

## 2024-09-01 DIAGNOSIS — K21.9 GASTROESOPHAGEAL REFLUX DISEASE, UNSPECIFIED WHETHER ESOPHAGITIS PRESENT: ICD-10-CM

## 2024-09-01 DIAGNOSIS — I25.10 CORONARY ARTERY DISEASE INVOLVING NATIVE CORONARY ARTERY OF NATIVE HEART WITHOUT ANGINA PECTORIS: ICD-10-CM

## 2024-09-03 RX ORDER — CLOPIDOGREL BISULFATE 75 MG/1
TABLET ORAL
Qty: 90 TABLET | Refills: 1 | Status: SHIPPED | OUTPATIENT
Start: 2024-09-03

## 2024-09-03 RX ORDER — LISINOPRIL 20 MG/1
TABLET ORAL
Qty: 90 TABLET | Refills: 1 | Status: SHIPPED | OUTPATIENT
Start: 2024-09-03

## 2024-09-03 RX ORDER — METOPROLOL SUCCINATE 50 MG/1
TABLET, EXTENDED RELEASE ORAL
Qty: 180 TABLET | Refills: 1 | Status: SHIPPED | OUTPATIENT
Start: 2024-09-03

## 2024-09-03 RX ORDER — FAMOTIDINE 20 MG/1
TABLET, FILM COATED ORAL
Qty: 180 TABLET | Refills: 1 | Status: SHIPPED | OUTPATIENT
Start: 2024-09-03

## 2024-09-03 RX ORDER — AMLODIPINE BESYLATE 10 MG/1
TABLET ORAL
Qty: 90 TABLET | Refills: 1 | Status: SHIPPED | OUTPATIENT
Start: 2024-09-03

## 2024-09-03 RX ORDER — ISOSORBIDE MONONITRATE 30 MG/1
TABLET, EXTENDED RELEASE ORAL
Qty: 90 TABLET | Refills: 1 | Status: SHIPPED | OUTPATIENT
Start: 2024-09-03

## 2024-10-24 ENCOUNTER — OFFICE VISIT (OUTPATIENT)
Dept: FAMILY MEDICINE CLINIC | Age: 74
End: 2024-10-24

## 2024-10-24 ENCOUNTER — TELEPHONE (OUTPATIENT)
Dept: ORTHOPEDIC SURGERY | Age: 74
End: 2024-10-24

## 2024-10-24 VITALS
HEIGHT: 68 IN | OXYGEN SATURATION: 98 % | TEMPERATURE: 97.1 F | RESPIRATION RATE: 17 BRPM | DIASTOLIC BLOOD PRESSURE: 84 MMHG | HEART RATE: 50 BPM | WEIGHT: 217.8 LBS | SYSTOLIC BLOOD PRESSURE: 146 MMHG | BODY MASS INDEX: 33.01 KG/M2

## 2024-10-24 DIAGNOSIS — R73.9 HYPERGLYCEMIA: ICD-10-CM

## 2024-10-24 DIAGNOSIS — Z23 NEED FOR INFLUENZA VACCINATION: Primary | ICD-10-CM

## 2024-10-24 DIAGNOSIS — I50.22 CHRONIC SYSTOLIC (CONGESTIVE) HEART FAILURE (HCC): ICD-10-CM

## 2024-10-24 DIAGNOSIS — N18.31 STAGE 3A CHRONIC KIDNEY DISEASE (HCC): ICD-10-CM

## 2024-10-24 DIAGNOSIS — I25.10 CORONARY ARTERY DISEASE INVOLVING NATIVE CORONARY ARTERY OF NATIVE HEART WITHOUT ANGINA PECTORIS: ICD-10-CM

## 2024-10-24 DIAGNOSIS — I10 ESSENTIAL HYPERTENSION: ICD-10-CM

## 2024-10-24 DIAGNOSIS — G89.29 CHRONIC KNEE PAIN, UNSPECIFIED LATERALITY: ICD-10-CM

## 2024-10-24 DIAGNOSIS — M25.569 CHRONIC KNEE PAIN, UNSPECIFIED LATERALITY: ICD-10-CM

## 2024-10-24 DIAGNOSIS — K21.9 GASTROESOPHAGEAL REFLUX DISEASE, UNSPECIFIED WHETHER ESOPHAGITIS PRESENT: ICD-10-CM

## 2024-10-24 DIAGNOSIS — I73.9 PVD (PERIPHERAL VASCULAR DISEASE) WITH CLAUDICATION (HCC): ICD-10-CM

## 2024-10-24 LAB — HBA1C MFR BLD: 6.2 %

## 2024-10-24 NOTE — PATIENT INSTRUCTIONS
Please call every pharmacy around and ask if they provide the shingles vaccine and how much it costs. If it's affordable please get it and let me know when you've received it.      Please call every pharmacy around and ask if they provide the RSV vaccine. If it's affordable please get it and let me know when you've received it.     Please call every pharmacy around and ask if they provide the Tdap (tetanus, diphtheria, acellular pertussis [whooping cough]) vaccine and how much it costs. If it's affordable please get it and let me know when you've received it.

## 2024-10-24 NOTE — PROGRESS NOTES
FM Progress Note    Subjective:   GERD.  Nighttime acid reflux resolved with twice daily dosing of pepcid.    CKD.  Controlled.  Follows with nephrology Dr. duvall.  Lisinopril.    Hypertension.  Controlled at home.  Amlodipine and lisinopril and Imdur and metoprolol.  Asymptomatic.    PVD. Asa and plavix.     HF. No cp or sob. Sees Dr. Godinez.     Hyperglycemia.   Lab Results   Component Value Date    LABA1C 6.2 10/24/2024       Moved from [employer] to [employer].     Health Maintenance Due   Topic Date Due    DTaP/Tdap/Td vaccine (1 - Tdap) Never done    Shingles vaccine (1 of 2) Never done    Respiratory Syncytial Virus (RSV) Pregnant or age 60 yrs+ (1 - 1-dose 60+ series) Never done    Lipids  12/19/2023    Flu vaccine (1) 08/01/2024    COVID-19 Vaccine (4 - 2023-24 season) 09/01/2024         Objective:   BP (!) 146/84 Comment: manual  Pulse 50   Temp 97.1 °F (36.2 °C) (Temporal)   Resp 17   Ht 1.727 m (5' 7.99\")   Wt 98.8 kg (217 lb 12.8 oz)   SpO2 98%   BMI 33.12 kg/m²   General appearance: NAD, alert and interacting appropriately  HEENT: NCAT, PERRLA, EOMI   Resp: CTAB, no WRC  CVS: RRR, no MRG  Abdomen: BS +, SNDNT  Extremities: No clubbing, cyanosis, or edema. Warm. Dry.        I have reviewed this patient's previous records.    I have reviewed this patient's labs.    I have reviewed this patient's medications.      Assessment/Plan:    Raffy was seen today for 6 month follow-up.    Diagnoses and all orders for this visit:    Need for influenza vaccination  -     Influenza, FLUAD Trivalent, (age 65 y+), IM, Preservative Free, 0.5mL    Hyperglycemia  -     POCT glycosylated hemoglobin (Hb A1C)    Coronary artery disease involving native coronary artery of native heart without angina pectoris  -     Lipid Panel; Future    Essential hypertension    Gastroesophageal reflux disease, unspecified whether esophagitis present    Chronic systolic (congestive) heart failure (HCC)    PVD (peripheral vascular

## 2024-10-24 NOTE — TELEPHONE ENCOUNTER
Referral received for patient via internal work-queue.     Referral reason/diagnosis: Chronic knee pain, unspecified laterality     Routed to providers for recommendations.    Future Appointments   Date Time Provider Department Center   11/19/2024  9:00 AM Ralph Lopez DO [facility]   11/19/2024  9:15 AM SCHEDULE, DERRICK NASCIMENTO AUDIO [facility]   12/2/2024  9:30 AM Carlos Godinez MD [facility]   4/24/2025  8:30 AM [facility]       Electronically signed by Joycelyn Fernando on 10/24/2024 at 12:07 PM

## 2024-10-24 NOTE — TELEPHONE ENCOUNTER
Future Appointments   Date Time Provider Department Center   11/12/2024  9:30 AM [facility]   11/19/2024  9:00 AM Ralph Lopez DO [facility]   11/19/2024  9:15 AM YASMANI, DERRICK NASCIMENTO AUDIO [facility]   12/2/2024  9:30 AM Carlos Godinez MD [facility] CARDIO [facility]   4/24/2025  8:30 AM Hansel Nagy MD [facility] DEP

## 2024-11-12 ENCOUNTER — OFFICE VISIT (OUTPATIENT)
Dept: ORTHOPEDIC SURGERY | Age: 74
End: 2024-11-12

## 2024-11-12 VITALS
DIASTOLIC BLOOD PRESSURE: 79 MMHG | SYSTOLIC BLOOD PRESSURE: 134 MMHG | BODY MASS INDEX: 32.89 KG/M2 | WEIGHT: 217 LBS | HEIGHT: 68 IN | RESPIRATION RATE: 18 BRPM | HEART RATE: 71 BPM | TEMPERATURE: 97.5 F

## 2024-11-12 DIAGNOSIS — M17.0 PRIMARY OSTEOARTHRITIS OF BOTH KNEES: ICD-10-CM

## 2024-11-12 DIAGNOSIS — R52 PAIN: Primary | ICD-10-CM

## 2024-11-12 RX ORDER — BUPIVACAINE HYDROCHLORIDE 2.5 MG/ML
3 INJECTION, SOLUTION INFILTRATION; PERINEURAL ONCE
Status: COMPLETED | OUTPATIENT
Start: 2024-11-12 | End: 2024-11-12

## 2024-11-12 RX ORDER — TRIAMCINOLONE ACETONIDE 40 MG/ML
80 INJECTION, SUSPENSION INTRA-ARTICULAR; INTRAMUSCULAR ONCE
Status: COMPLETED | OUTPATIENT
Start: 2024-11-12 | End: 2024-11-12

## 2024-11-12 RX ADMIN — BUPIVACAINE HYDROCHLORIDE 7.5 MG: 2.5 INJECTION, SOLUTION INFILTRATION; PERINEURAL at 10:20

## 2024-11-12 RX ADMIN — TRIAMCINOLONE ACETONIDE 80 MG: 40 INJECTION, SUSPENSION INTRA-ARTICULAR; INTRAMUSCULAR at 10:21

## 2024-11-12 NOTE — PROGRESS NOTES
Allergies      Review of Systems   Constitutional: Negative for fever, chills, diaphoresis, appetite change and fatigue.   HENT: Negative for dental issues, hearing loss and tinnitus. Negative for congestion, sinus pressure, sneezing, sore throat. Negative for headache.  Eyes: Negative for visual disturbance, blurred and double vision. Negative for pain, discharge, redness and itching  Respiratory: Negative for cough, shortness of breath and wheezing.   Cardiovascular: Negative for chest pain, palpitations and leg swelling. No dyspnea on exertion   Gastrointestinal:   Negative for nausea, vomiting, abdominal pain, diarrhea, constipation  or black or bloody.  Hematologic\Lymphatic:  negative for bleeding, petechiae,   Genitourinary: Negative for hematuria and difficulty urinating.   Musculoskeletal: Negative for neck pain and stiffness. Negative for back pain, see HPI  Skin: Negative for pallor, rash and wound.   Neurological: Negative for dizziness, tremors, seizures, weakness, light-headedness, no TIA or stroke symptoms. No numbness and headaches.   Psychiatric/Behavioral: Negative.        OBJECTIVE:      Physical Examination:   General appearance: alert, well appearing, and in no distress,  normal appearing weight. No visible signs of trauma   Mental status: alert, oriented to person, place, and time, normal mood, behavior, speech, dress, motor activity, and thought processes  Abdomen: soft, nondistended  Resp:   resp easy and unlabored, no audible wheezes note, normal symmetrical expansion of both hemithoraces  Cardiac: distal pulses palpable, skin and extremities well perfused  Neurological: alert, oriented X3, normal speech, no focal findings or movement disorder noted, motor and sensory grossly normal bilaterally, normal muscle tone, no tremors, strength 5/5, normal gait and station  HEENT: normochephalic atraumatic, external ears and eyes normal, sclera normal, neck supple, no nasal discharge.   Extremities:

## 2024-11-14 RX ORDER — ATORVASTATIN CALCIUM 40 MG/1
40 TABLET, FILM COATED ORAL DAILY
Qty: 90 TABLET | Refills: 0 | Status: SHIPPED | OUTPATIENT
Start: 2024-11-14

## 2024-11-19 ENCOUNTER — OFFICE VISIT (OUTPATIENT)
Dept: ENT CLINIC | Age: 74
End: 2024-11-19
Payer: MEDICARE

## 2024-11-19 ENCOUNTER — PROCEDURE VISIT (OUTPATIENT)
Dept: AUDIOLOGY | Age: 74
End: 2024-11-19
Payer: MEDICARE

## 2024-11-19 VITALS
WEIGHT: 217 LBS | HEIGHT: 68 IN | OXYGEN SATURATION: 98 % | DIASTOLIC BLOOD PRESSURE: 79 MMHG | BODY MASS INDEX: 32.89 KG/M2 | HEART RATE: 65 BPM | SYSTOLIC BLOOD PRESSURE: 137 MMHG

## 2024-11-19 DIAGNOSIS — H61.23 CERUMEN DEBRIS ON TYMPANIC MEMBRANE OF BOTH EARS: Primary | ICD-10-CM

## 2024-11-19 DIAGNOSIS — H90.3 SENSORINEURAL HEARING LOSS (SNHL) OF BOTH EARS: Primary | ICD-10-CM

## 2024-11-19 DIAGNOSIS — H90.3 SENSORINEURAL HEARING LOSS (SNHL) OF BOTH EARS: ICD-10-CM

## 2024-11-19 PROCEDURE — 92557 COMPREHENSIVE HEARING TEST: CPT

## 2024-11-19 PROCEDURE — 3075F SYST BP GE 130 - 139MM HG: CPT | Performed by: OTOLARYNGOLOGY

## 2024-11-19 PROCEDURE — 99213 OFFICE O/P EST LOW 20 MIN: CPT | Performed by: OTOLARYNGOLOGY

## 2024-11-19 PROCEDURE — 1159F MED LIST DOCD IN RCRD: CPT | Performed by: OTOLARYNGOLOGY

## 2024-11-19 PROCEDURE — 3078F DIAST BP <80 MM HG: CPT | Performed by: OTOLARYNGOLOGY

## 2024-11-19 PROCEDURE — 1124F ACP DISCUSS-NO DSCNMKR DOCD: CPT | Performed by: OTOLARYNGOLOGY

## 2024-11-19 PROCEDURE — 1160F RVW MEDS BY RX/DR IN RCRD: CPT | Performed by: OTOLARYNGOLOGY

## 2024-11-19 PROCEDURE — 92567 TYMPANOMETRY: CPT

## 2024-11-19 ASSESSMENT — ENCOUNTER SYMPTOMS
COLOR CHANGE: 0
ABDOMINAL PAIN: 0
EYE DISCHARGE: 0
VOMITING: 0
CHEST TIGHTNESS: 0
SHORTNESS OF BREATH: 0
RESPIRATORY NEGATIVE: 1
GASTROINTESTINAL NEGATIVE: 1
EYE PAIN: 0
EYES NEGATIVE: 1
SINUS PAIN: 0
APNEA: 0
RHINORRHEA: 0
DIARRHEA: 0

## 2024-11-19 NOTE — PROGRESS NOTES
This patient was referred for audiometric and tympanometric testing by Dr. Lopez due to hearing loss. Patient is doing well with the hearing aids, but struggles to understand people sometimes.    Audiometry using pure tone air and bone conduction testing revealed an essentially mild to profound sensorineural hearing loss, bilaterally. Reliability was good. Speech reception thresholds were in good agreement with the pure tone averages, bilaterally. Speech discrimination scores were excellent, in the right ear and poor (64%), in the left ear.    Tympanometry revealed normal middle ear peak pressure and compliance, bilaterally.    The results were reviewed with the patient and ordering provider.     Recommendations for follow up will be made pending ordering provider consult.    Patient given cerustop for hearing aids.     Tianna Avalos/CCC-A  [place] Lic [id number]  Electronically signed by Tianna Avalos on 11/19/2024 at 10:54 AM

## 2024-11-19 NOTE — PROGRESS NOTES
normal                            Assessment:       Diagnosis Orders   1. Cerumen debris on tympanic membrane of both ears        2. Sensorineural hearing loss (SNHL) of both ears                   Plan:      Discussed the discrepancy of the left and right discrim score and pt wants to hold off for a year.  He is not complaining of any issues   Follow up in 1 year(s)  May get MRI next year if not better.     Hearing aids are doing well.     FU 1 year    RX given today:

## 2025-01-03 ENCOUNTER — OFFICE VISIT (OUTPATIENT)
Dept: CARDIOLOGY CLINIC | Age: 75
End: 2025-01-03

## 2025-01-03 VITALS
DIASTOLIC BLOOD PRESSURE: 76 MMHG | HEIGHT: 68 IN | SYSTOLIC BLOOD PRESSURE: 124 MMHG | WEIGHT: 220 LBS | BODY MASS INDEX: 33.34 KG/M2 | RESPIRATION RATE: 20 BRPM | HEART RATE: 70 BPM

## 2025-01-03 DIAGNOSIS — Z86.39 H/O VITAMIN D DEFICIENCY: ICD-10-CM

## 2025-01-03 DIAGNOSIS — Z87.09 H/O ACUTE RESPIRATORY FAILURE: ICD-10-CM

## 2025-01-03 DIAGNOSIS — H90.3 SENSORINEURAL HEARING LOSS (SNHL) OF BOTH EARS: ICD-10-CM

## 2025-01-03 DIAGNOSIS — Z98.61 HISTORY OF PTCA: ICD-10-CM

## 2025-01-03 DIAGNOSIS — I25.10 CORONARY ARTERY DISEASE INVOLVING NATIVE CORONARY ARTERY OF NATIVE HEART WITHOUT ANGINA PECTORIS: Primary | ICD-10-CM

## 2025-01-03 DIAGNOSIS — I10 ESSENTIAL HYPERTENSION: ICD-10-CM

## 2025-01-03 DIAGNOSIS — I25.2 HISTORY OF ACUTE INFERIOR WALL MI: ICD-10-CM

## 2025-01-03 DIAGNOSIS — N25.81 SECONDARY HYPERPARATHYROIDISM OF RENAL ORIGIN (HCC): ICD-10-CM

## 2025-01-03 DIAGNOSIS — I73.9 PVD (PERIPHERAL VASCULAR DISEASE) WITH CLAUDICATION (HCC): ICD-10-CM

## 2025-01-03 DIAGNOSIS — E78.5 DYSLIPIDEMIA: ICD-10-CM

## 2025-01-03 DIAGNOSIS — R73.03 PREDIABETES: ICD-10-CM

## 2025-01-03 DIAGNOSIS — N18.31 STAGE 3A CHRONIC KIDNEY DISEASE (HCC): ICD-10-CM

## 2025-01-03 DIAGNOSIS — M17.0 ARTHRITIS OF BOTH KNEES: ICD-10-CM

## 2025-01-03 DIAGNOSIS — E79.0 HYPERURICEMIA: ICD-10-CM

## 2025-01-03 DIAGNOSIS — Z86.16 HISTORY OF COVID-19: ICD-10-CM

## 2025-01-03 DIAGNOSIS — E66.09 NON MORBID OBESITY DUE TO EXCESS CALORIES: ICD-10-CM

## 2025-01-03 RX ORDER — CLOPIDOGREL BISULFATE 75 MG/1
75 TABLET ORAL DAILY
Qty: 90 TABLET | Refills: 3 | Status: SHIPPED | OUTPATIENT
Start: 2025-01-03

## 2025-01-03 RX ORDER — LISINOPRIL 20 MG/1
20 TABLET ORAL DAILY
Qty: 90 TABLET | Refills: 3 | Status: SHIPPED | OUTPATIENT
Start: 2025-01-03

## 2025-01-03 RX ORDER — ISOSORBIDE MONONITRATE 30 MG/1
30 TABLET, EXTENDED RELEASE ORAL DAILY
Qty: 90 TABLET | Refills: 3 | Status: SHIPPED | OUTPATIENT
Start: 2025-01-03

## 2025-01-03 RX ORDER — AMLODIPINE BESYLATE 10 MG/1
10 TABLET ORAL DAILY
Qty: 90 TABLET | Refills: 3 | Status: SHIPPED | OUTPATIENT
Start: 2025-01-03

## 2025-01-03 RX ORDER — METOPROLOL SUCCINATE 50 MG/1
50 TABLET, EXTENDED RELEASE ORAL 2 TIMES DAILY
Qty: 180 TABLET | Refills: 3 | Status: SHIPPED | OUTPATIENT
Start: 2025-01-03

## 2025-01-03 NOTE — PROGRESS NOTES
Transportation     Lack of Transportation (Non-Medical): No   Physical Activity: Sufficiently Active (4/23/2024)    Exercise Vital Sign     Days of Exercise per Week: 7 days     Minutes of Exercise per Session: 30 min   Stress: No Stress Concern Present (4/21/2022)    [employer] [place] [employer] - Occupational Stress Questionnaire     Feeling of Stress : Not at all   Social Connections: Moderately Isolated (4/21/2022)    Social Connection and Isolation Panel [NHANES]     Frequency of Communication with Friends and Family: More than three times a week     Frequency of Social Gatherings with Friends and Family: Three times a week     Attends Nondenominational Services: More than 4 times per year     Active Member of Clubs or Organizations: No     Attends Club or Organization Meetings: Never     Marital Status:    Intimate Partner Violence: Not At Risk (4/21/2022)    Humiliation, Afraid, Rape, and Kick questionnaire     Fear of Current or Ex-Partner: No     Emotionally Abused: No     Physically Abused: No     Sexually Abused: No   Housing Stability: Unknown (4/23/2024)    Housing Stability Vital Sign     Unstable Housing in the Last Year: No       O:  COMPLETE PHYSICAL EXAM:      /76   Pulse 70   Resp 20   Ht 1.727 m (5' 8\")   Wt 99.8 kg (220 lb)   BMI 33.45 kg/m²      General:          Well-nourished, well-developed, obese male in no acute distress.   HEENT:           Normocephalic and atraumatic head. No JVD. No carotid bruits. Carotid upstrokes normal bilaterally.  No thyromegaly.                     Sclerae not icteric.  No xanthelasmas.  Mucous membranes moist.  Chest:              Symmetrical and nontender.  No deformities.  Lungs:             Clear to auscultation bilaterally.  Heart:              Normal S1 and S2.  No S3 or S4.  No murmurs or rubs.  Abdomen:        Soft, nontender without organomegaly or masses.  No bruits.  Normal bowel sounds.  Extremities:     No edema. Pulses

## 2025-02-04 ENCOUNTER — HOSPITAL ENCOUNTER (OUTPATIENT)
Age: 75
Discharge: HOME | End: 2025-02-04
Payer: MEDICARE

## 2025-02-04 LAB
25(OH)D3 SERPL-MCNC: 38.6 NG/ML (ref 30–100)
ALBUMIN SERPL-MCNC: 4.2 G/DL (ref 3.5–5.2)
ALP SERPL-CCNC: 123 U/L (ref 40–129)
ALT SERPL-CCNC: 9 U/L (ref 0–40)
ANION GAP SERPL CALCULATED.3IONS-SCNC: 11 MMOL/L (ref 7–16)
AST SERPL-CCNC: <5 U/L (ref 0–39)
BASOPHILS # BLD: 0.04 K/UL (ref 0–0.2)
BASOPHILS NFR BLD: 0 % (ref 0–2)
BILIRUB SERPL-MCNC: 0.6 MG/DL (ref 0–1.2)
BUN SERPL-MCNC: 18 MG/DL (ref 6–23)
CALCIUM SERPL-MCNC: 9.9 MG/DL (ref 8.6–10.2)
CHLORIDE SERPL-SCNC: 106 MMOL/L (ref 98–107)
CHOLEST SERPL-MCNC: 102 MG/DL
CO2 SERPL-SCNC: 24 MMOL/L (ref 22–29)
CREAT SERPL-MCNC: 1.3 MG/DL (ref 0.7–1.2)
EOSINOPHIL # BLD: 0.13 K/UL (ref 0.05–0.5)
EOSINOPHILS RELATIVE PERCENT: 2 % (ref 0–6)
ERYTHROCYTE [DISTWIDTH] IN BLOOD BY AUTOMATED COUNT: 11.9 % (ref 11.5–15)
FERRITIN SERPL-MCNC: 89 NG/ML
GFR, ESTIMATED: 59 ML/MIN/1.73M2
GLUCOSE SERPL-MCNC: 106 MG/DL (ref 74–99)
HCT VFR BLD AUTO: 39.6 % (ref 37–54)
HGB BLD-MCNC: 13.5 G/DL (ref 12.5–16.5)
IMM GRANULOCYTES # BLD AUTO: 0.03 K/UL (ref 0–0.58)
IMM GRANULOCYTES NFR BLD: 0 % (ref 0–5)
IRON SATN MFR SERPL: 26 % (ref 20–55)
IRON SERPL-MCNC: 73 UG/DL (ref 59–158)
LYMPHOCYTES NFR BLD: 2.72 K/UL (ref 1.5–4)
LYMPHOCYTES RELATIVE PERCENT: 31 % (ref 20–42)
MAGNESIUM SERPL-MCNC: 2.1 MG/DL (ref 1.6–2.6)
MCH RBC QN AUTO: 31.4 PG (ref 26–35)
MCHC RBC AUTO-ENTMCNC: 34.1 G/DL (ref 32–34.5)
MCV RBC AUTO: 92.1 FL (ref 80–99.9)
MONOCYTES NFR BLD: 0.78 K/UL (ref 0.1–0.95)
MONOCYTES NFR BLD: 9 % (ref 2–12)
NEUTROPHILS NFR BLD: 59 % (ref 43–80)
NEUTS SEG NFR BLD: 5.22 K/UL (ref 1.8–7.3)
PHOSPHATE SERPL-MCNC: 3.2 MG/DL (ref 2.5–4.5)
PLATELET # BLD AUTO: 210 K/UL (ref 130–450)
PMV BLD AUTO: 9 FL (ref 7–12)
POTASSIUM SERPL-SCNC: 4.4 MMOL/L (ref 3.5–5)
PROT SERPL-MCNC: 7 G/DL (ref 6.4–8.3)
PTH-INTACT SERPL-MCNC: 60.4 PG/ML (ref 15–65)
RBC # BLD AUTO: 4.3 M/UL (ref 3.8–5.8)
SODIUM SERPL-SCNC: 141 MMOL/L (ref 132–146)
TIBC SERPL-MCNC: 280 UG/DL (ref 250–450)
URATE SERPL-MCNC: 6.7 MG/DL (ref 3.4–7)
WBC OTHER # BLD: 8.9 K/UL (ref 4.5–11.5)

## 2025-02-04 PROCEDURE — 84550 ASSAY OF BLOOD/URIC ACID: CPT

## 2025-02-04 PROCEDURE — 83735 ASSAY OF MAGNESIUM: CPT

## 2025-02-04 PROCEDURE — 82728 ASSAY OF FERRITIN: CPT

## 2025-02-04 PROCEDURE — 83970 ASSAY OF PARATHORMONE: CPT

## 2025-02-04 PROCEDURE — 82306 VITAMIN D 25 HYDROXY: CPT

## 2025-02-04 PROCEDURE — 82465 ASSAY BLD/SERUM CHOLESTEROL: CPT

## 2025-02-04 PROCEDURE — 80053 COMPREHEN METABOLIC PANEL: CPT

## 2025-02-04 PROCEDURE — 36415 COLL VENOUS BLD VENIPUNCTURE: CPT

## 2025-02-04 PROCEDURE — 83540 ASSAY OF IRON: CPT

## 2025-02-04 PROCEDURE — 83550 IRON BINDING TEST: CPT

## 2025-02-04 PROCEDURE — 85025 COMPLETE CBC W/AUTO DIFF WBC: CPT

## 2025-02-04 PROCEDURE — 84100 ASSAY OF PHOSPHORUS: CPT

## 2025-02-10 RX ORDER — ATORVASTATIN CALCIUM 40 MG/1
40 TABLET, FILM COATED ORAL DAILY
Qty: 90 TABLET | Refills: 1 | Status: SHIPPED | OUTPATIENT
Start: 2025-02-10

## 2025-02-10 NOTE — TELEPHONE ENCOUNTER
Name of Medication(s) Requested:  Requested Prescriptions     Pending Prescriptions Disp Refills    atorvastatin (LIPITOR) 40 MG tablet [Pharmacy Med Name: Atorvastatin Calcium 40 MG Oral Tablet] 90 tablet 1     Sig: Take 1 tablet by mouth once daily       Medication is on current medication list Yes    Dosage and directions were verified? Yes    Quantity verified: 90 day supply     Pharmacy Verified?  Yes    Last Appointment:  10/24/2024    Future appts:  Future Appointments   Date Time Provider Department Center   3/4/2025  9:45 AM Jorge Troncoso MD [facility]   4/24/2025  8:30 AM Hansel Nagy MD [facility]   12/2/2025  8:00 AM Ralph Lopez DO [place] [facility]""        (If no appt send self scheduling link. .REFILLAPPT)  Scheduling request sent?     [] Yes  [x] No    Does patient need updated?  [] Yes  [x] No

## 2025-03-04 ENCOUNTER — PREP FOR PROCEDURE (OUTPATIENT)
Dept: ORTHOPEDIC SURGERY | Age: 75
End: 2025-03-04

## 2025-03-04 ENCOUNTER — TELEPHONE (OUTPATIENT)
Dept: ORTHOPEDIC SURGERY | Age: 75
End: 2025-03-04

## 2025-03-04 ENCOUNTER — OFFICE VISIT (OUTPATIENT)
Dept: ORTHOPEDIC SURGERY | Age: 75
End: 2025-03-04
Payer: MEDICARE

## 2025-03-04 DIAGNOSIS — K21.9 GASTROESOPHAGEAL REFLUX DISEASE, UNSPECIFIED WHETHER ESOPHAGITIS PRESENT: ICD-10-CM

## 2025-03-04 DIAGNOSIS — M17.11 OSTEOARTHRITIS OF RIGHT KNEE, UNSPECIFIED OSTEOARTHRITIS TYPE: Primary | ICD-10-CM

## 2025-03-04 DIAGNOSIS — Z01.818 PRE-OP TESTING: ICD-10-CM

## 2025-03-04 DIAGNOSIS — M17.0 PRIMARY OSTEOARTHRITIS OF BOTH KNEES: Primary | ICD-10-CM

## 2025-03-04 PROCEDURE — 20610 DRAIN/INJ JOINT/BURSA W/O US: CPT | Performed by: PHYSICIAN ASSISTANT

## 2025-03-04 PROCEDURE — 99214 OFFICE O/P EST MOD 30 MIN: CPT | Performed by: PHYSICIAN ASSISTANT

## 2025-03-04 PROCEDURE — 1124F ACP DISCUSS-NO DSCNMKR DOCD: CPT | Performed by: PHYSICIAN ASSISTANT

## 2025-03-04 PROCEDURE — 1159F MED LIST DOCD IN RCRD: CPT | Performed by: PHYSICIAN ASSISTANT

## 2025-03-04 RX ORDER — TRIAMCINOLONE ACETONIDE 40 MG/ML
80 INJECTION, SUSPENSION INTRA-ARTICULAR; INTRAMUSCULAR ONCE
Status: COMPLETED | OUTPATIENT
Start: 2025-03-04 | End: 2025-03-04

## 2025-03-04 RX ORDER — BUPIVACAINE HYDROCHLORIDE 2.5 MG/ML
3 INJECTION, SOLUTION INFILTRATION; PERINEURAL ONCE
Status: COMPLETED | OUTPATIENT
Start: 2025-03-04 | End: 2025-03-04

## 2025-03-04 RX ORDER — FAMOTIDINE 20 MG/1
20 TABLET, FILM COATED ORAL 2 TIMES DAILY
Qty: 180 TABLET | Refills: 1 | Status: SHIPPED | OUTPATIENT
Start: 2025-03-04

## 2025-03-04 RX ADMIN — TRIAMCINOLONE ACETONIDE 80 MG: 40 INJECTION, SUSPENSION INTRA-ARTICULAR; INTRAMUSCULAR at 10:11

## 2025-03-04 RX ADMIN — BUPIVACAINE HYDROCHLORIDE 7.5 MG: 2.5 INJECTION, SOLUTION INFILTRATION; PERINEURAL at 10:11

## 2025-03-04 ASSESSMENT — PROMIS GLOBAL HEALTH SCALE
SUM OF RESPONSES TO QUESTIONS 3, 6, 7, & 8: 16
IN GENERAL, WOULD YOU SAY YOUR QUALITY OF LIFE IS...[ON A SCALE OF 1 (POOR) TO 5 (EXCELLENT)]: GOOD
IN GENERAL, HOW WOULD YOU RATE YOUR MENTAL HEALTH, INCLUDING YOUR MOOD AND YOUR ABILITY TO THINK [ON A SCALE OF 1 (POOR) TO 5 (EXCELLENT)]?: GOOD
IN THE PAST 7 DAYS, HOW WOULD YOU RATE YOUR FATIGUE ON AVERAGE [ON A SCALE FROM 1 (NONE) TO 5 (VERY SEVERE)]?: MILD
IN GENERAL, PLEASE RATE HOW WELL YOU CARRY OUT YOUR USUAL SOCIAL ACTIVITIES (INCLUDES ACTIVITIES AT HOME, AT WORK, AND IN YOUR COMMUNITY, AND RESPONSIBILITIES AS A PARENT, CHILD, SPOUSE, EMPLOYEE, FRIEND, ETC) [ON A SCALE OF 1 (POOR) TO 5 (EXCELLENT)]?: GOOD
IN GENERAL, WOULD YOU SAY YOUR HEALTH IS...[ON A SCALE OF 1 (POOR) TO 5 (EXCELLENT)]: GOOD
TO WHAT EXTENT ARE YOU ABLE TO CARRY OUT YOUR EVERYDAY PHYSICAL ACTIVITIES SUCH AS WALKING, CLIMBING STAIRS, CARRYING GROCERIES, OR MOVING A CHAIR [ON A SCALE OF 1 (NOT AT ALL) TO 5 (COMPLETELY)]?: MODERATELY
IN GENERAL, HOW WOULD YOU RATE YOUR SATISFACTION WITH YOUR SOCIAL ACTIVITIES AND RELATIONSHIPS [ON A SCALE OF 1 (POOR) TO 5 (EXCELLENT)]?: GOOD
IN THE PAST 7 DAYS, HOW OFTEN HAVE YOU BEEN BOTHERED BY EMOTIONAL PROBLEMS, SUCH AS FEELING ANXIOUS, DEPRESSED, OR IRRITABLE [ON A SCALE FROM 1 (NEVER) TO 5 (ALWAYS)]?: RARELY
SUM OF RESPONSES TO QUESTIONS 2, 4, 5, & 10: 13
IN THE PAST 7 DAYS, HOW WOULD YOU RATE YOUR PAIN ON AVERAGE [ON A SCALE FROM 0 (NO PAIN) TO 10 (WORST IMAGINABLE PAIN)]?: 6
IN GENERAL, HOW WOULD YOU RATE YOUR PHYSICAL HEALTH [ON A SCALE OF 1 (POOR) TO 5 (EXCELLENT)]?: GOOD

## 2025-03-04 ASSESSMENT — KOOS JR
BENDING TO THE FLOOR TO PICK UP OBJECT: MODERATE
KOOS JR TOTAL INTERVAL SCORE: 42.281
RISING FROM SITTING: MODERATE
TWISING OR PIVOTING ON KNEE: SEVERE
GOING UP OR DOWN STAIRS: SEVERE
STRAIGHTENING KNEE FULLY: SEVERE
HOW SEVERE IS YOUR KNEE STIFFNESS AFTER FIRST WAKING IN MORNING: MODERATE
STANDING UPRIGHT: SEVERE

## 2025-03-04 NOTE — TELEPHONE ENCOUNTER
Patient Name:  Raffy Oseguera  Patient :  1950        DIAGNOSIS & PROCEDURE:                       Procedure/Operation: Right Total Knee Arthroplasty           Diagnosis:  05389    Location:  [facility]    Surgeon:  Dr. Jorge Troncoso MD    SCHEDULING INFORMATION:                          Date: 2025    Time: 7:30 am              Anesthesia:  General with possible Regional                                               Status: Outpatient with Overnight Stay needed for OBS.  *Surgery was scheduled as Inpatient due to [facility] Surgery Scheduling change effective 2024*    Special Comments:  Surgery Clearance prior to surgery is requested from: PCP, Cardiology, and Nephrology.       Vendor: [employer]  [x]   Notified on 3-4-2025 by [facility]    Electronically signed by Rebecca Frankel MA on 3/4/2025 at 11:09 AM

## 2025-03-04 NOTE — PATIENT INSTRUCTIONS
extremity fractures, this will be a non-weightbearing or touch-down weight bearing status. Usually this is continued for 6-10 weeks before you are allowed to start weightbearing.    Weightbearing as tolerated (WBAT)  This means that you can put as much weight as you can tolerated through your arm or leg. The key is AS TOLERATED. You should not push through the pain and use your pain as a guide. Often times you will have a brace to provide increased stability.    Partial Weightbearing (PWB)  This status allows you to put some weight through your leg. Usually it is a percentage of full body weight. For example, 50% partial weight bearing means you can stand with both feet on the floor. It is important you do not increase the amount of weight above what you are told to do.    Touchdown Weightbearing (TDWB)  This allows you to use your leg to help balance but does not allow you to put weight on your leg. It is often used with fractures higher up in the leg (hip or pelvis) to decrease the strain on the muscles around the hip.    Non-weightbearing (NWB)  You are not allowed to put any weight on your leg or arm. This is usually used when the fixation (i.e. plates and screws) can not hold up to repetitive stress of walking or lifting. The bone must be allowed to heal some before you can do more.

## 2025-03-04 NOTE — PROGRESS NOTES
Orthopaedic H&P Note    Raffy Oseguera is a 75 y.o. male, his YOB: 1950 with the following history as recorded in [facility]:      Patient Active Problem List    Diagnosis Date Noted    Chronic systolic (congestive) heart failure 10/18/2022    Pneumonia due to COVID-19 virus 11/17/2021    Basal cell carcinoma (BCC) of skin of nose     Neuropathy of both feet     PAD (peripheral artery disease) 09/24/2019    Stage 3a chronic kidney disease (HCC)     Coronary artery disease involving native coronary artery of native heart without angina pectoris 11/06/2018    STEMI (ST elevation myocardial infarction) (HCC) 10/23/2018    Essential hypertension 10/23/2018     Current Outpatient Medications   Medication Sig Dispense Refill    atorvastatin (LIPITOR) 40 MG tablet Take 1 tablet by mouth once daily 90 tablet 1    lisinopril (PRINIVIL;ZESTRIL) 20 MG tablet Take 1 tablet by mouth daily 90 tablet 3    metoprolol succinate (TOPROL XL) 50 MG extended release tablet Take 1 tablet by mouth in the morning and at bedtime 180 tablet 3    amLODIPine (NORVASC) 10 MG tablet Take 1 tablet by mouth daily 90 tablet 3    clopidogrel (PLAVIX) 75 MG tablet Take 1 tablet by mouth daily 90 tablet 3    isosorbide mononitrate (IMDUR) 30 MG extended release tablet Take 1 tablet by mouth daily 90 tablet 3    famotidine (PEPCID) 20 MG tablet Take 1 tablet by mouth twice daily 180 tablet 1    diclofenac sodium (VOLTAREN) 1 % GEL Apply 2 g topically 2 times daily 150 g 1    nitroGLYCERIN (NITROSTAT) 0.4 MG SL tablet Place 1 tablet under the tongue every 5 minutes as needed for Chest pain 25 tablet 1    Cholecalciferol (VITAMIN D3) 50 MCG (2000 UT) CAPS Take 1 capsule by mouth daily Ld 11/12      aspirin 81 MG chewable tablet Take 1 tablet by mouth daily 30 tablet 3     No current facility-administered medications for this visit.     Allergies: Patient has no known allergies.  Past Medical History:   Diagnosis Date    Bilateral leg and

## 2025-03-04 NOTE — TELEPHONE ENCOUNTER
Name of Medication(s) Requested:    famotidine (PEPCID) 20 MG       Medication is on current medication list Yes    Dosage and directions were verified? Yes    Quantity verified: 90 day supply     Pharmacy Verified?  Yes    [address] -  [phone] -  [phone]         Last Appointment:  10/24/2024    Future appts:  Future Appointments   Date Time Provider Department Center   3/4/2025  9:45 AM Jorge Troncoso MD [facility]   4/24/2025  8:30 AM Hansel Nagy MD [place] [facility]   12/2/2025  8:00 AM Ralph Lopez DO [facility]        (If no appt send self scheduling link. .REFILLAPPT)  Scheduling request sent?     [] Yes  [] No    Does patient need updated?  [] Yes  [] No

## 2025-03-06 ENCOUNTER — TELEPHONE (OUTPATIENT)
Dept: ADMINISTRATIVE | Age: 75
End: 2025-03-06

## 2025-03-13 DIAGNOSIS — Z01.810 PREOPERATIVE CARDIOVASCULAR EXAMINATION: ICD-10-CM

## 2025-03-13 DIAGNOSIS — I25.10 CORONARY ARTERY DISEASE: Primary | ICD-10-CM

## 2025-03-13 RX ORDER — REGADENOSON 0.08 MG/ML
0.4 INJECTION, SOLUTION INTRAVENOUS
OUTPATIENT
Start: 2025-03-13

## 2025-03-14 NOTE — TELEPHONE ENCOUNTER
Patient's stress test is scheduled by [facility], at the [place] office, after being pulled from the Workqueue.     Electronically signed by Socorro Rojas on 3/14/2025 at 10:16 AM

## 2025-03-20 ENCOUNTER — OFFICE VISIT (OUTPATIENT)
Dept: FAMILY MEDICINE CLINIC | Age: 75
End: 2025-03-20
Payer: MEDICARE

## 2025-03-20 VITALS
TEMPERATURE: 97.9 F | WEIGHT: 219 LBS | BODY MASS INDEX: 33.3 KG/M2 | SYSTOLIC BLOOD PRESSURE: 122 MMHG | HEART RATE: 74 BPM | DIASTOLIC BLOOD PRESSURE: 71 MMHG

## 2025-03-20 DIAGNOSIS — Z01.818 PREOP EXAMINATION: ICD-10-CM

## 2025-03-20 DIAGNOSIS — I50.22 CHRONIC SYSTOLIC (CONGESTIVE) HEART FAILURE: ICD-10-CM

## 2025-03-20 DIAGNOSIS — I25.10 CORONARY ARTERY DISEASE INVOLVING NATIVE CORONARY ARTERY OF NATIVE HEART WITHOUT ANGINA PECTORIS: Primary | ICD-10-CM

## 2025-03-20 PROCEDURE — 3074F SYST BP LT 130 MM HG: CPT | Performed by: FAMILY MEDICINE

## 2025-03-20 PROCEDURE — G2211 COMPLEX E/M VISIT ADD ON: HCPCS | Performed by: FAMILY MEDICINE

## 2025-03-20 PROCEDURE — 99213 OFFICE O/P EST LOW 20 MIN: CPT | Performed by: FAMILY MEDICINE

## 2025-03-20 PROCEDURE — 3078F DIAST BP <80 MM HG: CPT | Performed by: FAMILY MEDICINE

## 2025-03-20 PROCEDURE — 1159F MED LIST DOCD IN RCRD: CPT | Performed by: FAMILY MEDICINE

## 2025-03-20 PROCEDURE — 1124F ACP DISCUSS-NO DSCNMKR DOCD: CPT | Performed by: FAMILY MEDICINE

## 2025-03-20 SDOH — ECONOMIC STABILITY: FOOD INSECURITY: WITHIN THE PAST 12 MONTHS, THE FOOD YOU BOUGHT JUST DIDN'T LAST AND YOU DIDN'T HAVE MONEY TO GET MORE.: NEVER TRUE

## 2025-03-20 SDOH — ECONOMIC STABILITY: FOOD INSECURITY: WITHIN THE PAST 12 MONTHS, YOU WORRIED THAT YOUR FOOD WOULD RUN OUT BEFORE YOU GOT MONEY TO BUY MORE.: NEVER TRUE

## 2025-03-20 ASSESSMENT — PATIENT HEALTH QUESTIONNAIRE - PHQ9
SUM OF ALL RESPONSES TO PHQ QUESTIONS 1-9: 0
2. FEELING DOWN, DEPRESSED OR HOPELESS: NOT AT ALL
SUM OF ALL RESPONSES TO PHQ QUESTIONS 1-9: 0
1. LITTLE INTEREST OR PLEASURE IN DOING THINGS: NOT AT ALL
SUM OF ALL RESPONSES TO PHQ QUESTIONS 1-9: 0
SUM OF ALL RESPONSES TO PHQ QUESTIONS 1-9: 0

## 2025-03-20 NOTE — PROGRESS NOTES
Progress Note    Subjective: Raffy Oseguera is a 75 y.o. year old male here for preop evaluation.       Health Maintenance Due   Topic Date Due    DTaP/Tdap/Td vaccine (1 - Tdap) Never done    Shingles vaccine (1 of 2) Never done    COVID-19 Vaccine (4 - 2024-25 season) 09/01/2024    Annual Wellness Visit (Medicare Advantage)  01/01/2025    Respiratory Syncytial Virus (RSV) Pregnant or age 60 yrs+ (1 - 1-dose 75+ series) Never done       Knee OA.   Planning on having knee surgery with Dr. Hedrick on 4/14/25.   Previous issues with anesthesia: no  Agreeable to receiving blood products: yes      Past Medical History:   Diagnosis Date    Bilateral leg and foot pain     CAD (coronary artery disease)     follows with Dr Godinez    CKD (chronic kidney disease), stage III ([facility]) 2/26/2019    Hypertension     Nasal mass 11/2020    STEMI (ST elevation myocardial infarction) ([facility]) 10/23/2018     Past Surgical History:   Procedure Laterality Date    CORONARY ANGIOPLASTY WITH STENT PLACEMENT  10/23/2018    3.5 x 26 Resolute [place] to mid RCA by Dr. Godinez    NOSE SURGERY N/A 11/16/2020    EXCISION RIGHT NASAL LESION WITH RECONSTRUCTION WITH FROZEN SECTION performed by Ralph Lopez DO at [facility] OR     Family History   Problem Relation Age of Onset    High Blood Pressure Mother     Heart Disease Mother     Dementia Mother     Diabetes Mother     Cancer Father 57        lung, tongue, brain    Heart Attack Sister     Heart Attack Brother      Social History     Socioeconomic History    Marital status:      Spouse name: Not on file    Number of children: Not on file    Years of education: Not on file    Highest education level: Not on file   Occupational History    Not on file   Tobacco Use    Smoking status: Never     Passive exposure: Past    Smokeless tobacco: Never   Vaping Use    Vaping status: Never Used   Substance and Sexual Activity    Alcohol use: Not Currently     Comment: none x 30 years    Drug use:

## 2025-03-20 NOTE — PATIENT INSTRUCTIONS
Please call every pharmacy around and ask if they provide the shingles vaccine and how much it costs. If it's affordable please get it and let me know when you've received it.      Please call every pharmacy around and ask if they provide the Tdap (tetanus, diphtheria, acellular pertussis [whooping cough]) vaccine and how much it costs. If it's affordable please get it and let me know when you've received it.      Please call every pharmacy around and ask if they provide the RSV vaccine. If it's affordable please get it and let me know when you've received it.

## 2025-03-21 ENCOUNTER — TELEPHONE (OUTPATIENT)
Dept: CARDIOLOGY | Age: 75
End: 2025-03-21

## 2025-03-21 RX ORDER — SODIUM CHLORIDE 0.9 % (FLUSH) 0.9 %
5-40 SYRINGE (ML) INJECTION PRN
Status: CANCELLED | OUTPATIENT
Start: 2025-03-21

## 2025-03-21 RX ORDER — SODIUM CHLORIDE 0.9 % (FLUSH) 0.9 %
5-40 SYRINGE (ML) INJECTION EVERY 12 HOURS SCHEDULED
Status: CANCELLED | OUTPATIENT
Start: 2025-03-21

## 2025-03-21 RX ORDER — SODIUM CHLORIDE 9 MG/ML
INJECTION, SOLUTION INTRAVENOUS PRN
Status: CANCELLED | OUTPATIENT
Start: 2025-03-21

## 2025-03-21 NOTE — TELEPHONE ENCOUNTER
Left message on voice mail to remind patient of Lexiscan stress test appointment on March 25, 2025 at 0800. Instructions for test,, and COVID-19 preprocedure information left on voice mail. Asked patient to call with any questions or if unable to keep appointment.

## 2025-03-21 NOTE — H&P
typical arthritic pain, achy in the joint, becoming more severe with stairs and any twisting motion of the right knee. Rest makes the right knee better along with NSAIDs and over the counter analgesics, but states they are now less effective. He does use an occasional straight cane, patient can ambulate 1 to 2 blocks prior to pain limiting their function. Raffy Oseguera is having difficulty with ADLs, and states this pain is limiting here activity decreasing their quality of life. He would like to discuss TKA.      In addition, patient complains of left knee pain.  He also has known OA in the left knee and he would like a left knee CSI today.     Review of Systems   Constitutional: Negative for fever, chills, diaphoresis, appetite change and fatigue.   HENT: Negative for dental issues, hearing loss and tinnitus. Negative for congestion, sinus pressure, sneezing, sore throat. Negative for headache.  Eyes: Negative for visual disturbance, blurred and double vision. Negative for pain, discharge, redness and itching  Respiratory: Negative for cough, shortness of breath and wheezing.   Cardiovascular: Negative for chest pain, palpitations and leg swelling. No dyspnea on exertion   Gastrointestinal:   Negative for nausea, vomiting, abdominal pain, diarrhea, constipation  or black or bloody.  Hematologic\Lymphatic:  negative for bleeding, petechiae,   Genitourinary: Negative for hematuria and difficulty urinating.   Musculoskeletal: Negative for neck pain and stiffness. Mild for back pain, negative joint swelling and gait problem.   Skin: Negative for pallor, rash and wound.   Neurological: Negative for dizziness, tremors, seizures, weakness, light-headedness, no TIA or stroke symptoms. No numbness and headaches.   Psychiatric/Behavioral: Negative.      Physical Examination:   General appearance: alert, well appearing, and in no distress,  normal appearing weight  Mental status: alert, oriented to person, place, and

## 2025-03-21 NOTE — H&P (VIEW-ONLY)
Orthopaedic H&P Note     Raffy Oseguera is a 75 y.o. male, his YOB: 1950 with the following history as recorded in [facility]:             Patient Active Problem List     Diagnosis Date Noted    Chronic systolic (congestive) heart failure 10/18/2022    Pneumonia due to COVID-19 virus 11/17/2021    Basal cell carcinoma (BCC) of skin of nose      Neuropathy of both feet      PAD (peripheral artery disease) 09/24/2019    Stage 3a chronic kidney disease (HCC)      Coronary artery disease involving native coronary artery of native heart without angina pectoris 11/06/2018    STEMI (ST elevation myocardial infarction) (HCC) 10/23/2018    Essential hypertension 10/23/2018      Current Facility-Administered Medications          Current Outpatient Medications   Medication Sig Dispense Refill    atorvastatin (LIPITOR) 40 MG tablet Take 1 tablet by mouth once daily 90 tablet 1    lisinopril (PRINIVIL;ZESTRIL) 20 MG tablet Take 1 tablet by mouth daily 90 tablet 3    metoprolol succinate (TOPROL XL) 50 MG extended release tablet Take 1 tablet by mouth in the morning and at bedtime 180 tablet 3    amLODIPine (NORVASC) 10 MG tablet Take 1 tablet by mouth daily 90 tablet 3    clopidogrel (PLAVIX) 75 MG tablet Take 1 tablet by mouth daily 90 tablet 3    isosorbide mononitrate (IMDUR) 30 MG extended release tablet Take 1 tablet by mouth daily 90 tablet 3    famotidine (PEPCID) 20 MG tablet Take 1 tablet by mouth twice daily 180 tablet 1    diclofenac sodium (VOLTAREN) 1 % GEL Apply 2 g topically 2 times daily 150 g 1    nitroGLYCERIN (NITROSTAT) 0.4 MG SL tablet Place 1 tablet under the tongue every 5 minutes as needed for Chest pain 25 tablet 1    Cholecalciferol (VITAMIN D3) 50 MCG (2000 UT) CAPS Take 1 capsule by mouth daily Ld 11/12        aspirin 81 MG chewable tablet Take 1 tablet by mouth daily 30 tablet 3      No current facility-administered medications for this visit.         Allergies: Patient has no known

## 2025-03-24 ENCOUNTER — TELEPHONE (OUTPATIENT)
Dept: CARDIOLOGY CLINIC | Age: 75
End: 2025-03-24

## 2025-03-24 NOTE — TELEPHONE ENCOUNTER
Patient need cardiac clearance for RIGHT TOTAL KNEE ARTHROPLASTY . Date of  surgery 4/14/2025. If patient needs to hold any medication prior to surgery. Last seen in office 1/3/25.     Please advise   [phone]

## 2025-03-25 ENCOUNTER — HOSPITAL ENCOUNTER (OUTPATIENT)
Dept: CARDIOLOGY | Age: 75
Discharge: HOME | End: 2025-03-27
Attending: INTERNAL MEDICINE
Payer: MEDICARE

## 2025-03-25 VITALS
SYSTOLIC BLOOD PRESSURE: 120 MMHG | DIASTOLIC BLOOD PRESSURE: 78 MMHG | HEART RATE: 70 BPM | RESPIRATION RATE: 20 BRPM | HEIGHT: 67 IN | BODY MASS INDEX: 34.84 KG/M2 | WEIGHT: 222 LBS

## 2025-03-25 DIAGNOSIS — I25.10 CORONARY ARTERY DISEASE: ICD-10-CM

## 2025-03-25 DIAGNOSIS — Z01.810 PREOPERATIVE CARDIOVASCULAR EXAMINATION: ICD-10-CM

## 2025-03-25 LAB
ECHO BSA: 2.18 M2
STRESS BASELINE DIAS BP: 78 MMHG
STRESS BASELINE HR: 64 BPM
STRESS BASELINE SYS BP: 120 MMHG
STRESS ESTIMATED WORKLOAD: 1 METS
STRESS PEAK DIAS BP: 70 MMHG
STRESS PEAK SYS BP: 124 MMHG
STRESS PERCENT HR ACHIEVED: 56 %
STRESS POST PEAK HR: 81 BPM
STRESS RATE PRESSURE PRODUCT: NORMAL BPM*MMHG
STRESS TARGET HR: 145 BPM

## 2025-03-25 PROCEDURE — 3430000000 HC RX DIAGNOSTIC RADIOPHARMACEUTICAL: Performed by: INTERNAL MEDICINE

## 2025-03-25 PROCEDURE — 6360000002 HC RX W HCPCS: Performed by: INTERNAL MEDICINE

## 2025-03-25 PROCEDURE — 78452 HT MUSCLE IMAGE SPECT MULT: CPT | Performed by: INTERNAL MEDICINE

## 2025-03-25 PROCEDURE — A9500 TC99M SESTAMIBI: HCPCS | Performed by: INTERNAL MEDICINE

## 2025-03-25 PROCEDURE — 93017 CV STRESS TEST TRACING ONLY: CPT

## 2025-03-25 PROCEDURE — 93018 CV STRESS TEST I&R ONLY: CPT | Performed by: INTERNAL MEDICINE

## 2025-03-25 PROCEDURE — 2500000003 HC RX 250 WO HCPCS: Performed by: INTERNAL MEDICINE

## 2025-03-25 PROCEDURE — 93016 CV STRESS TEST SUPVJ ONLY: CPT | Performed by: INTERNAL MEDICINE

## 2025-03-25 RX ORDER — TETRAKIS(2-METHOXYISOBUTYLISOCYANIDE)COPPER(I) TETRAFLUOROBORATE 1 MG/ML
10.9 INJECTION, POWDER, LYOPHILIZED, FOR SOLUTION INTRAVENOUS
Status: COMPLETED | OUTPATIENT
Start: 2025-03-25 | End: 2025-03-25

## 2025-03-25 RX ORDER — SODIUM CHLORIDE 0.9 % (FLUSH) 0.9 %
10 SYRINGE (ML) INJECTION PRN
Status: DISCONTINUED | OUTPATIENT
Start: 2025-03-25 | End: 2025-03-28 | Stop reason: HOSPADM

## 2025-03-25 RX ORDER — REGADENOSON 0.08 MG/ML
0.4 INJECTION, SOLUTION INTRAVENOUS
Status: COMPLETED | OUTPATIENT
Start: 2025-03-25 | End: 2025-03-25

## 2025-03-25 RX ORDER — TETRAKIS(2-METHOXYISOBUTYLISOCYANIDE)COPPER(I) TETRAFLUOROBORATE 1 MG/ML
34.8 INJECTION, POWDER, LYOPHILIZED, FOR SOLUTION INTRAVENOUS
Status: COMPLETED | OUTPATIENT
Start: 2025-03-25 | End: 2025-03-25

## 2025-03-25 RX ADMIN — REGADENOSON 0.4 MG: 0.08 INJECTION, SOLUTION INTRAVENOUS at 08:52

## 2025-03-25 RX ADMIN — SODIUM CHLORIDE, PRESERVATIVE FREE 10 ML: 5 INJECTION INTRAVENOUS at 08:52

## 2025-03-25 RX ADMIN — SODIUM CHLORIDE, PRESERVATIVE FREE 10 ML: 5 INJECTION INTRAVENOUS at 08:53

## 2025-03-25 RX ADMIN — Medication 34.8 MILLICURIE: at 08:52

## 2025-03-25 RX ADMIN — Medication 10.9 MILLICURIE: at 07:58

## 2025-03-25 RX ADMIN — SODIUM CHLORIDE, PRESERVATIVE FREE 10 ML: 5 INJECTION INTRAVENOUS at 07:59

## 2025-04-02 ENCOUNTER — TELEPHONE (OUTPATIENT)
Dept: ORTHOPEDIC SURGERY | Age: 75
End: 2025-04-02

## 2025-04-02 NOTE — TELEPHONE ENCOUNTER
Cardiac Clearance letter received from Dr. Godinez office. Patient is cleared to have his Right TKA done.     Cardiac Clearance letter, OV Notes from PCP office stating patient can proceed with surgery, Nephrology Request for Surgery Clearance Form faxed to [facility] today.

## 2025-04-02 NOTE — PROGRESS NOTES
[facility]   PRE-ADMISSION TESTING GENERAL INSTRUCTIONS  PAT Phone Number: [phone]      GENERAL INSTRUCTIONS:    [x] The Night Before Surgery: Take an antibacterial soap shower - followed by CHG Wipes.   -The Morning of Surgery: Repeat CHG Wipes.  [x] Do not wear makeup, lotions, powders, deodorant the morning of surgery.  [x] No SOLID foods after midnight. You may have CLEAR liquids up to 2 hours before your surgery.  [x] You may brush your teeth, gargle, but do NOT swallow water.   [x] No tobacco products, illegal drugs, or alcohol within 24 hours of your surgery.  [x] Jewelry or valuables should not be brought to the hospital. All body and/or tongue piercing's must be removed prior to arriving to hospital. No contact lens or hair pins.   [x] Bring insurance card and photo ID.  [x] Bring copy of living will or healthcare power of  papers to be placed in your electronic record.  [x] Transfusion (Green) Bracelet: Please bring with you to hospital, day of surgery.     PARKING INSTRUCTIONS:     [x] ARRIVAL DATE & TIME: 4/14 AT 5:30 AM  [x] Times are subject to change. We will contact you the business day before surgery if that were to occur.  [x] Enter into the [facility] Entrance. Two people may accompany you. Masks are not required.  [x] Parking Lot \"I\" is where you will park. It is located on the corner of [address] and [address]. The entrance is on [address].   Only one vehicle - per patient, is permitted in parking lot.   Upon entering the parking lot, a voucher ticket will print.    EDUCATION INSTRUCTIONS:           [x] Pre-admission Testing educational folder given.  [x] Incentive Spirometry,coughing & deep breathing exercises reviewed.  [x] Fluoroscopy-Xray used in surgery reviewed with patient. Educational pamphlet placed in chart.  [x] Pain: Post-op pain is normal and to be expected. You will be asked to rate your pain from 0-10.  [x] Joint camp

## 2025-04-02 NOTE — TELEPHONE ENCOUNTER
Called Dr. Carlos vidal this morning regarding Cardiac Clearance/Risk Assessment for upcoming surgery with Dr. Troncoso on 4- for Right TKA at [facility]. Patient was instructed to obtain surgery clearance from his PCP, Cardiologist, and Kidney Doctor.     PCP deemed the patient High Risk during his office visit on 3- but the PCP did state the patient could proceed with surgery as scheduled.     Cardiac Clearance Form was faxed to Dr. Carlos vidal on 3-. There is a phone encounter message in Blockchain from Dr. Gretchen vidal on 3- stating patient needs to have Lexiscan Nuclear Stress Test on 3-. Stress Test was performed on 3-.    I contacted Dr.Hout vidal this morning regarding stress test results and to find out if patient could proceed with surgery on 4-. Dr. Gretchen vidal did not answer my call. LMOM for a call back regarding stress test results and to confirm if patient could proceed with surgery or not. I left my call back number and fax number.

## 2025-04-02 NOTE — TELEPHONE ENCOUNTER
Called [facility]. Patient is seen by Dr. Darnell Collier.   I spoke with Marycruz at the .     Per Marycruz, patient was last seen on 2-. His next office visit is 2-. Dr. Collier office was not aware patient is scheduled for surgery by Dr. Troncoso since the patient did not inform Dr. Collier office of his upcoming surgery.   Dr. Collier has been out of the office for several weeks since he is covering at [facility] currently. Dr. Collier will not be back in the office until 4-1-2025 (patient surgery date).     Informed Marycruz I have obtained surgery clearance from the PCP but I am currently waiting to hear back from Cardiology regarding if the patient is cleared for 4-. If patient is not going to be cleared by Cardiology for his surgery we will not send a Nephrology Clearance/ Risk Assessment Form to their office since surgery will need to be cancelled. I also stated the Nephrology Clearance/ Risk Assessment Form can be filled out by whoever is covering for Dr. Collier at the office while Dr. Collier is out of the office (as long as Dr. Collier is agreeable). Marycruz verbalized understanding.

## 2025-04-08 ENCOUNTER — HOSPITAL ENCOUNTER (OUTPATIENT)
Dept: PREADMISSION TESTING | Age: 75
Discharge: HOME | End: 2025-04-08
Payer: MEDICARE

## 2025-04-08 VITALS
RESPIRATION RATE: 18 BRPM | DIASTOLIC BLOOD PRESSURE: 85 MMHG | SYSTOLIC BLOOD PRESSURE: 156 MMHG | WEIGHT: 218 LBS | TEMPERATURE: 98.7 F | OXYGEN SATURATION: 95 % | BODY MASS INDEX: 34.14 KG/M2 | HEART RATE: 65 BPM

## 2025-04-08 DIAGNOSIS — Z01.818 PRE-OP TESTING: ICD-10-CM

## 2025-04-08 DIAGNOSIS — Z01.812 PRE-OPERATIVE LABORATORY EXAMINATION: Primary | ICD-10-CM

## 2025-04-08 DIAGNOSIS — M17.11 OSTEOARTHRITIS OF RIGHT KNEE, UNSPECIFIED OSTEOARTHRITIS TYPE: ICD-10-CM

## 2025-04-08 LAB
ALBUMIN SERPL-MCNC: 3.9 G/DL (ref 3.5–5.2)
ALP SERPL-CCNC: 95 U/L (ref 40–129)
ALT SERPL-CCNC: 13 U/L (ref 0–40)
ANION GAP SERPL CALCULATED.3IONS-SCNC: 14 MMOL/L (ref 7–16)
AST SERPL-CCNC: 11 U/L (ref 0–39)
BASOPHILS # BLD: 0.03 K/UL (ref 0–0.2)
BASOPHILS NFR BLD: 0 % (ref 0–2)
BILIRUB SERPL-MCNC: 0.4 MG/DL (ref 0–1.2)
BUN SERPL-MCNC: 18 MG/DL (ref 6–23)
CALCIUM SERPL-MCNC: 9.5 MG/DL (ref 8.6–10.2)
CHLORIDE SERPL-SCNC: 107 MMOL/L (ref 98–107)
CO2 SERPL-SCNC: 20 MMOL/L (ref 22–29)
CREAT SERPL-MCNC: 1.3 MG/DL (ref 0.7–1.2)
EOSINOPHIL # BLD: 0.1 K/UL (ref 0.05–0.5)
EOSINOPHILS RELATIVE PERCENT: 1 % (ref 0–6)
ERYTHROCYTE [DISTWIDTH] IN BLOOD BY AUTOMATED COUNT: 12.8 % (ref 11.5–15)
GFR, ESTIMATED: 57 ML/MIN/1.73M2
GLUCOSE SERPL-MCNC: 105 MG/DL (ref 74–99)
HBA1C MFR BLD: 6.2 % (ref 4–5.6)
HCT VFR BLD AUTO: 39.4 % (ref 37–54)
HGB BLD-MCNC: 12.7 G/DL (ref 12.5–16.5)
IMM GRANULOCYTES # BLD AUTO: 0.03 K/UL (ref 0–0.58)
IMM GRANULOCYTES NFR BLD: 0 % (ref 0–5)
INR PPP: 1
LYMPHOCYTES NFR BLD: 2.48 K/UL (ref 1.5–4)
LYMPHOCYTES RELATIVE PERCENT: 31 % (ref 20–42)
MCH RBC QN AUTO: 30.6 PG (ref 26–35)
MCHC RBC AUTO-ENTMCNC: 32.2 G/DL (ref 32–34.5)
MCV RBC AUTO: 94.9 FL (ref 80–99.9)
MONOCYTES NFR BLD: 0.69 K/UL (ref 0.1–0.95)
MONOCYTES NFR BLD: 9 % (ref 2–12)
NEUTROPHILS NFR BLD: 59 % (ref 43–80)
NEUTS SEG NFR BLD: 4.72 K/UL (ref 1.8–7.3)
PLATELET # BLD AUTO: 223 K/UL (ref 130–450)
PMV BLD AUTO: 8.9 FL (ref 7–12)
POTASSIUM SERPL-SCNC: 4.5 MMOL/L (ref 3.5–5)
PROT SERPL-MCNC: 6.4 G/DL (ref 6.4–8.3)
PROTHROMBIN TIME: 10.7 SEC (ref 9.3–12.4)
RBC # BLD AUTO: 4.15 M/UL (ref 3.8–5.8)
SODIUM SERPL-SCNC: 141 MMOL/L (ref 132–146)
WBC OTHER # BLD: 8.1 K/UL (ref 4.5–11.5)

## 2025-04-08 PROCEDURE — 86901 BLOOD TYPING SEROLOGIC RH(D): CPT

## 2025-04-08 PROCEDURE — 86900 BLOOD TYPING SEROLOGIC ABO: CPT

## 2025-04-08 PROCEDURE — 86880 COOMBS TEST DIRECT: CPT

## 2025-04-08 PROCEDURE — 86902 BLOOD TYPE ANTIGEN DONOR EA: CPT

## 2025-04-08 PROCEDURE — 85025 COMPLETE CBC W/AUTO DIFF WBC: CPT

## 2025-04-08 PROCEDURE — 86920 COMPATIBILITY TEST SPIN: CPT

## 2025-04-08 PROCEDURE — 85610 PROTHROMBIN TIME: CPT

## 2025-04-08 PROCEDURE — 36415 COLL VENOUS BLD VENIPUNCTURE: CPT

## 2025-04-08 PROCEDURE — 86850 RBC ANTIBODY SCREEN: CPT

## 2025-04-08 PROCEDURE — 87081 CULTURE SCREEN ONLY: CPT

## 2025-04-08 PROCEDURE — 86922 COMPATIBILITY TEST ANTIGLOB: CPT

## 2025-04-08 PROCEDURE — 80053 COMPREHEN METABOLIC PANEL: CPT

## 2025-04-08 PROCEDURE — 86905 BLOOD TYPING RBC ANTIGENS: CPT

## 2025-04-08 PROCEDURE — 86870 RBC ANTIBODY IDENTIFICATION: CPT

## 2025-04-08 PROCEDURE — 83036 HEMOGLOBIN GLYCOSYLATED A1C: CPT

## 2025-04-09 LAB
MICROORGANISM SPEC CULT: NORMAL
SPECIMEN DESCRIPTION: NORMAL

## 2025-04-11 ENCOUNTER — TELEPHONE (OUTPATIENT)
Dept: ORTHOPEDIC SURGERY | Age: 75
End: 2025-04-11

## 2025-04-11 NOTE — TELEPHONE ENCOUNTER
Nephrology Clearance received via fax from [facility].   Clearance scanned into patient chart. Patient can proceed with surgery on 4-.

## 2025-04-11 NOTE — TELEPHONE ENCOUNTER
Dr. Collier office ([facility]) called our office. Stated Dr. Collier is out of the office until 4- covering for the hospitals. Their practice only has 2 physicians in their practice and the other physician does not see Dr. Collier patients. I stated several times out patient is scheduled as the 1st surgery case for 4- with Dr. Troncoso.     Dr. Collier MA asked me to fax over the lab results from 4-8-2025. She will try to get a hold of Dr. Collier but she is not sure if she will get a answer back by end of day today.     Lab results faxed over to their office with fax confirmation page received.   Notified Zen Hooper PA-C of the above conversation.

## 2025-04-11 NOTE — PROGRESS NOTES
Patient notified of new arrival time for procedure 4/14. New arrival time is now 0500. Patient verbalized understanding.

## 2025-04-11 NOTE — TELEPHONE ENCOUNTER
Called [facility] office. Waiting on clearance from their office since the patient has obtained clearance from PCP and Cardiology.     I left voicemail stating I need to hear back from their office by 3:30 pm today at the very latest or surgery will be cancelled.

## 2025-04-12 NOTE — PROGRESS NOTES
I spoke with Raffy about orthopaedic education. We discussed information regarding pre, intra, post-op, discharge, and LOS expectations. I encouraged the patient to call with any questions or concerns.    Raffy lives alone, but his son will be here from [place] for two weeks staying with him.  He lives in a first floor apartment and has no entry steps. He has a front WW.   He prefers to use the [facility] for his medications on discharge, and prefers [facility] for PT.

## 2025-04-12 NOTE — PROGRESS NOTES
Attempted to call patient regarding education prior to orthopedic surgery. Left message to return call/await call back.

## 2025-04-13 LAB
ABO/RH: NORMAL
ANTIBODY IDENTIFICATION: NORMAL
ANTIBODY SCREEN: POSITIVE
ANTIGEN TYPE, PATIENT: NORMAL
ARM BAND NUMBER: NORMAL
BLOOD BANK DISPENSE STATUS: NORMAL
BLOOD BANK SAMPLE EXPIRATION: NORMAL
BPU ID: NORMAL
COMPONENT: NORMAL
CROSSMATCH RESULT: NORMAL
DAT IGG: NEGATIVE
TRANSFUSION STATUS: NORMAL
UNIT DIVISION: 0

## 2025-04-14 ENCOUNTER — HOSPITAL ENCOUNTER (INPATIENT)
Age: 75
LOS: 1 days | Discharge: HOME | DRG: 470 | End: 2025-04-14
Attending: ORTHOPAEDIC SURGERY | Admitting: ORTHOPAEDIC SURGERY
Payer: MEDICARE

## 2025-04-14 ENCOUNTER — ANESTHESIA (OUTPATIENT)
Dept: OPERATING ROOM | Age: 75
DRG: 470 | End: 2025-04-14
Payer: MEDICARE

## 2025-04-14 ENCOUNTER — ANESTHESIA EVENT (OUTPATIENT)
Dept: OPERATING ROOM | Age: 75
DRG: 470 | End: 2025-04-14
Payer: MEDICARE

## 2025-04-14 ENCOUNTER — APPOINTMENT (OUTPATIENT)
Dept: GENERAL RADIOLOGY | Age: 75
DRG: 470 | End: 2025-04-14
Attending: ORTHOPAEDIC SURGERY
Payer: MEDICARE

## 2025-04-14 ENCOUNTER — TELEPHONE (OUTPATIENT)
Dept: CARDIOLOGY CLINIC | Age: 75
End: 2025-04-14

## 2025-04-14 VITALS
HEART RATE: 87 BPM | TEMPERATURE: 97.8 F | DIASTOLIC BLOOD PRESSURE: 79 MMHG | OXYGEN SATURATION: 94 % | WEIGHT: 218 LBS | HEIGHT: 68 IN | RESPIRATION RATE: 18 BRPM | SYSTOLIC BLOOD PRESSURE: 121 MMHG | BODY MASS INDEX: 33.04 KG/M2

## 2025-04-14 DIAGNOSIS — Z96.651 S/P TOTAL KNEE ARTHROPLASTY, RIGHT: Primary | ICD-10-CM

## 2025-04-14 DIAGNOSIS — M17.11 OSTEOARTHRITIS OF RIGHT KNEE: ICD-10-CM

## 2025-04-14 PROCEDURE — G0378 HOSPITAL OBSERVATION PER HR: HCPCS

## 2025-04-14 PROCEDURE — 1200000000 HC SEMI PRIVATE

## 2025-04-14 PROCEDURE — C1713 ANCHOR/SCREW BN/BN,TIS/BN: HCPCS | Performed by: ORTHOPAEDIC SURGERY

## 2025-04-14 PROCEDURE — 6370000000 HC RX 637 (ALT 250 FOR IP)

## 2025-04-14 PROCEDURE — 2500000003 HC RX 250 WO HCPCS: Performed by: NURSE ANESTHETIST, CERTIFIED REGISTERED

## 2025-04-14 PROCEDURE — 97530 THERAPEUTIC ACTIVITIES: CPT

## 2025-04-14 PROCEDURE — 0SRC0J9 REPLACEMENT OF RIGHT KNEE JOINT WITH SYNTHETIC SUBSTITUTE, CEMENTED, OPEN APPROACH: ICD-10-PCS | Performed by: ORTHOPAEDIC SURGERY

## 2025-04-14 PROCEDURE — 64447 NJX AA&/STRD FEMORAL NRV IMG: CPT | Performed by: ANESTHESIOLOGY

## 2025-04-14 PROCEDURE — 6360000002 HC RX W HCPCS: Performed by: ORTHOPAEDIC SURGERY

## 2025-04-14 PROCEDURE — 2709999900 HC NON-CHARGEABLE SUPPLY: Performed by: ORTHOPAEDIC SURGERY

## 2025-04-14 PROCEDURE — 6360000002 HC RX W HCPCS: Performed by: ANESTHESIOLOGY

## 2025-04-14 PROCEDURE — C1776 JOINT DEVICE (IMPLANTABLE): HCPCS | Performed by: ORTHOPAEDIC SURGERY

## 2025-04-14 PROCEDURE — 97161 PT EVAL LOW COMPLEX 20 MIN: CPT

## 2025-04-14 PROCEDURE — 2500000003 HC RX 250 WO HCPCS: Performed by: SPECIALIST/TECHNOLOGIST

## 2025-04-14 PROCEDURE — 88305 TISSUE EXAM BY PATHOLOGIST: CPT

## 2025-04-14 PROCEDURE — 6370000000 HC RX 637 (ALT 250 FOR IP): Performed by: SPECIALIST/TECHNOLOGIST

## 2025-04-14 PROCEDURE — 88311 DECALCIFY TISSUE: CPT

## 2025-04-14 PROCEDURE — 3600000015 HC SURGERY LEVEL 5 ADDTL 15MIN: Performed by: ORTHOPAEDIC SURGERY

## 2025-04-14 PROCEDURE — 3700000001 HC ADD 15 MINUTES (ANESTHESIA): Performed by: ORTHOPAEDIC SURGERY

## 2025-04-14 PROCEDURE — 2580000003 HC RX 258: Performed by: ORTHOPAEDIC SURGERY

## 2025-04-14 PROCEDURE — 73560 X-RAY EXAM OF KNEE 1 OR 2: CPT

## 2025-04-14 PROCEDURE — 3600000005 HC SURGERY LEVEL 5 BASE: Performed by: ORTHOPAEDIC SURGERY

## 2025-04-14 PROCEDURE — 2500000003 HC RX 250 WO HCPCS: Performed by: PHYSICIAN ASSISTANT

## 2025-04-14 PROCEDURE — 2580000003 HC RX 258: Performed by: PHYSICIAN ASSISTANT

## 2025-04-14 PROCEDURE — 2720000010 HC SURG SUPPLY STERILE: Performed by: ORTHOPAEDIC SURGERY

## 2025-04-14 PROCEDURE — 97165 OT EVAL LOW COMPLEX 30 MIN: CPT

## 2025-04-14 PROCEDURE — 7100000001 HC PACU RECOVERY - ADDTL 15 MIN: Performed by: ORTHOPAEDIC SURGERY

## 2025-04-14 PROCEDURE — 99221 1ST HOSP IP/OBS SF/LOW 40: CPT | Performed by: FAMILY MEDICINE

## 2025-04-14 PROCEDURE — 6360000002 HC RX W HCPCS: Performed by: NURSE ANESTHETIST, CERTIFIED REGISTERED

## 2025-04-14 PROCEDURE — 7100000000 HC PACU RECOVERY - FIRST 15 MIN: Performed by: ORTHOPAEDIC SURGERY

## 2025-04-14 PROCEDURE — 2500000003 HC RX 250 WO HCPCS: Performed by: ORTHOPAEDIC SURGERY

## 2025-04-14 PROCEDURE — 97535 SELF CARE MNGMENT TRAINING: CPT

## 2025-04-14 PROCEDURE — 3700000000 HC ANESTHESIA ATTENDED CARE: Performed by: ORTHOPAEDIC SURGERY

## 2025-04-14 PROCEDURE — 6360000002 HC RX W HCPCS: Performed by: PHYSICIAN ASSISTANT

## 2025-04-14 PROCEDURE — 2580000003 HC RX 258: Performed by: NURSE ANESTHETIST, CERTIFIED REGISTERED

## 2025-04-14 PROCEDURE — 27447 TOTAL KNEE ARTHROPLASTY: CPT | Performed by: ORTHOPAEDIC SURGERY

## 2025-04-14 PROCEDURE — 6360000002 HC RX W HCPCS: Performed by: SPECIALIST/TECHNOLOGIST

## 2025-04-14 DEVICE — CRUCIATE RETAINING FEMORAL
Type: IMPLANTABLE DEVICE | Site: KNEE | Status: FUNCTIONAL
Brand: TRIATHLON

## 2025-04-14 DEVICE — PRIMARY TIBIAL BASEPLATE
Type: IMPLANTABLE DEVICE | Site: KNEE | Status: FUNCTIONAL
Brand: TRIATHLON

## 2025-04-14 DEVICE — TIBIAL BEARING INSERT - CS
Type: IMPLANTABLE DEVICE | Site: KNEE | Status: FUNCTIONAL
Brand: TRIATHLON

## 2025-04-14 DEVICE — ASYMMETRIC PATELLA
Type: IMPLANTABLE DEVICE | Site: KNEE | Status: FUNCTIONAL
Brand: TRIATHLON

## 2025-04-14 RX ORDER — SODIUM CHLORIDE 0.9 % (FLUSH) 0.9 %
5-40 SYRINGE (ML) INJECTION PRN
Status: DISCONTINUED | OUTPATIENT
Start: 2025-04-14 | End: 2025-04-14 | Stop reason: HOSPADM

## 2025-04-14 RX ORDER — POLYETHYLENE GLYCOL 3350 17 G/17G
17 POWDER, FOR SOLUTION ORAL DAILY PRN
Status: DISCONTINUED | OUTPATIENT
Start: 2025-04-14 | End: 2025-04-14 | Stop reason: HOSPADM

## 2025-04-14 RX ORDER — TRANEXAMIC ACID 10 MG/ML
INJECTION, SOLUTION INTRAVENOUS
Status: DISCONTINUED | OUTPATIENT
Start: 2025-04-14 | End: 2025-04-14 | Stop reason: SDUPTHER

## 2025-04-14 RX ORDER — ASPIRIN 81 MG/1
81 TABLET, CHEWABLE ORAL DAILY
Status: DISCONTINUED | OUTPATIENT
Start: 2025-04-15 | End: 2025-04-14 | Stop reason: HOSPADM

## 2025-04-14 RX ORDER — TOBRAMYCIN 1.2 G/30ML
INJECTION, POWDER, LYOPHILIZED, FOR SOLUTION INTRAVENOUS PRN
Status: DISCONTINUED | OUTPATIENT
Start: 2025-04-14 | End: 2025-04-14 | Stop reason: ALTCHOICE

## 2025-04-14 RX ORDER — SODIUM CHLORIDE 9 MG/ML
INJECTION, SOLUTION INTRAVENOUS PRN
Status: DISCONTINUED | OUTPATIENT
Start: 2025-04-14 | End: 2025-04-14 | Stop reason: HOSPADM

## 2025-04-14 RX ORDER — ROPIVACAINE HYDROCHLORIDE 5 MG/ML
INJECTION, SOLUTION EPIDURAL; INFILTRATION; PERINEURAL
Status: DISCONTINUED | OUTPATIENT
Start: 2025-04-14 | End: 2025-04-14 | Stop reason: SDUPTHER

## 2025-04-14 RX ORDER — HYDROMORPHONE HYDROCHLORIDE 1 MG/ML
0.25 INJECTION, SOLUTION INTRAMUSCULAR; INTRAVENOUS; SUBCUTANEOUS EVERY 5 MIN PRN
Status: DISCONTINUED | OUTPATIENT
Start: 2025-04-14 | End: 2025-04-14 | Stop reason: HOSPADM

## 2025-04-14 RX ORDER — AMLODIPINE BESYLATE 10 MG/1
10 TABLET ORAL DAILY
Status: DISCONTINUED | OUTPATIENT
Start: 2025-04-15 | End: 2025-04-14 | Stop reason: HOSPADM

## 2025-04-14 RX ORDER — ONDANSETRON 2 MG/ML
4 INJECTION INTRAMUSCULAR; INTRAVENOUS EVERY 6 HOURS PRN
Status: DISCONTINUED | OUTPATIENT
Start: 2025-04-14 | End: 2025-04-14 | Stop reason: HOSPADM

## 2025-04-14 RX ORDER — VITAMIN B COMPLEX
2000 TABLET ORAL DAILY
Status: DISCONTINUED | OUTPATIENT
Start: 2025-04-14 | End: 2025-04-14 | Stop reason: HOSPADM

## 2025-04-14 RX ORDER — ASPIRIN 81 MG/1
81 TABLET ORAL 2 TIMES DAILY
Status: DISCONTINUED | OUTPATIENT
Start: 2025-04-14 | End: 2025-04-14

## 2025-04-14 RX ORDER — ACETAMINOPHEN 325 MG/1
650 TABLET ORAL EVERY 6 HOURS
Status: DISCONTINUED | OUTPATIENT
Start: 2025-04-14 | End: 2025-04-14 | Stop reason: HOSPADM

## 2025-04-14 RX ORDER — FAMOTIDINE 20 MG/1
20 TABLET, FILM COATED ORAL 2 TIMES DAILY
Status: DISCONTINUED | OUTPATIENT
Start: 2025-04-14 | End: 2025-04-14 | Stop reason: HOSPADM

## 2025-04-14 RX ORDER — HYDROMORPHONE HYDROCHLORIDE 1 MG/ML
0.5 INJECTION, SOLUTION INTRAMUSCULAR; INTRAVENOUS; SUBCUTANEOUS EVERY 5 MIN PRN
Status: DISCONTINUED | OUTPATIENT
Start: 2025-04-14 | End: 2025-04-14 | Stop reason: HOSPADM

## 2025-04-14 RX ORDER — ENOXAPARIN SODIUM 100 MG/ML
40 INJECTION SUBCUTANEOUS DAILY
Status: DISCONTINUED | OUTPATIENT
Start: 2025-04-14 | End: 2025-04-14 | Stop reason: HOSPADM

## 2025-04-14 RX ORDER — PROPOFOL 10 MG/ML
INJECTION, EMULSION INTRAVENOUS
Status: DISCONTINUED | OUTPATIENT
Start: 2025-04-14 | End: 2025-04-14 | Stop reason: SDUPTHER

## 2025-04-14 RX ORDER — EPHEDRINE SULFATE/0.9% NACL/PF 25 MG/5 ML
SYRINGE (ML) INTRAVENOUS
Status: DISCONTINUED | OUTPATIENT
Start: 2025-04-14 | End: 2025-04-14 | Stop reason: SDUPTHER

## 2025-04-14 RX ORDER — LIDOCAINE HYDROCHLORIDE 10 MG/ML
10 INJECTION, SOLUTION INFILTRATION; PERINEURAL
Status: DISCONTINUED | OUTPATIENT
Start: 2025-04-14 | End: 2025-04-14 | Stop reason: HOSPADM

## 2025-04-14 RX ORDER — ROPIVACAINE HYDROCHLORIDE 5 MG/ML
20 INJECTION, SOLUTION EPIDURAL; INFILTRATION; PERINEURAL
Status: DISCONTINUED | OUTPATIENT
Start: 2025-04-14 | End: 2025-04-14 | Stop reason: HOSPADM

## 2025-04-14 RX ORDER — OXYCODONE AND ACETAMINOPHEN 5; 325 MG/1; MG/1
1 TABLET ORAL EVERY 6 HOURS PRN
Qty: 28 TABLET | Refills: 0 | Status: SHIPPED | OUTPATIENT
Start: 2025-04-14 | End: 2025-04-22 | Stop reason: SDUPTHER

## 2025-04-14 RX ORDER — ISOSORBIDE MONONITRATE 30 MG/1
30 TABLET, EXTENDED RELEASE ORAL DAILY
Status: DISCONTINUED | OUTPATIENT
Start: 2025-04-15 | End: 2025-04-14 | Stop reason: HOSPADM

## 2025-04-14 RX ORDER — SODIUM CHLORIDE 0.9 % (FLUSH) 0.9 %
5-40 SYRINGE (ML) INJECTION EVERY 12 HOURS SCHEDULED
Status: DISCONTINUED | OUTPATIENT
Start: 2025-04-14 | End: 2025-04-14 | Stop reason: HOSPADM

## 2025-04-14 RX ORDER — ENOXAPARIN SODIUM 100 MG/ML
30 INJECTION SUBCUTANEOUS DAILY
Status: DISCONTINUED | OUTPATIENT
Start: 2025-04-14 | End: 2025-04-14 | Stop reason: DRUGHIGH

## 2025-04-14 RX ORDER — OXYCODONE HYDROCHLORIDE 5 MG/1
5 TABLET ORAL EVERY 4 HOURS PRN
Status: DISCONTINUED | OUTPATIENT
Start: 2025-04-14 | End: 2025-04-14 | Stop reason: HOSPADM

## 2025-04-14 RX ORDER — VANCOMYCIN HYDROCHLORIDE 1 G/20ML
INJECTION, POWDER, LYOPHILIZED, FOR SOLUTION INTRAVENOUS PRN
Status: DISCONTINUED | OUTPATIENT
Start: 2025-04-14 | End: 2025-04-14 | Stop reason: ALTCHOICE

## 2025-04-14 RX ORDER — ATORVASTATIN CALCIUM 40 MG/1
40 TABLET, FILM COATED ORAL NIGHTLY
Status: DISCONTINUED | OUTPATIENT
Start: 2025-04-14 | End: 2025-04-14 | Stop reason: HOSPADM

## 2025-04-14 RX ORDER — MEPERIDINE HYDROCHLORIDE 25 MG/ML
12.5 INJECTION INTRAMUSCULAR; INTRAVENOUS; SUBCUTANEOUS EVERY 5 MIN PRN
Status: DISCONTINUED | OUTPATIENT
Start: 2025-04-14 | End: 2025-04-14 | Stop reason: HOSPADM

## 2025-04-14 RX ORDER — ONDANSETRON 2 MG/ML
INJECTION INTRAMUSCULAR; INTRAVENOUS
Status: DISCONTINUED | OUTPATIENT
Start: 2025-04-14 | End: 2025-04-14 | Stop reason: SDUPTHER

## 2025-04-14 RX ORDER — OXYCODONE HYDROCHLORIDE 10 MG/1
10 TABLET ORAL EVERY 4 HOURS PRN
Status: DISCONTINUED | OUTPATIENT
Start: 2025-04-14 | End: 2025-04-14 | Stop reason: HOSPADM

## 2025-04-14 RX ORDER — NALOXONE HYDROCHLORIDE 0.4 MG/ML
INJECTION, SOLUTION INTRAMUSCULAR; INTRAVENOUS; SUBCUTANEOUS PRN
Status: DISCONTINUED | OUTPATIENT
Start: 2025-04-14 | End: 2025-04-14 | Stop reason: HOSPADM

## 2025-04-14 RX ORDER — METOPROLOL SUCCINATE 50 MG/1
50 TABLET, EXTENDED RELEASE ORAL 2 TIMES DAILY
Status: DISCONTINUED | OUTPATIENT
Start: 2025-04-14 | End: 2025-04-14 | Stop reason: HOSPADM

## 2025-04-14 RX ORDER — MIDAZOLAM HYDROCHLORIDE 2 MG/2ML
1 INJECTION, SOLUTION INTRAMUSCULAR; INTRAVENOUS EVERY 5 MIN PRN
Status: DISCONTINUED | OUTPATIENT
Start: 2025-04-14 | End: 2025-04-14 | Stop reason: HOSPADM

## 2025-04-14 RX ORDER — NITROGLYCERIN 0.4 MG/1
0.4 TABLET SUBLINGUAL EVERY 5 MIN PRN
Status: DISCONTINUED | OUTPATIENT
Start: 2025-04-14 | End: 2025-04-14 | Stop reason: HOSPADM

## 2025-04-14 RX ORDER — SODIUM CHLORIDE, SODIUM LACTATE, POTASSIUM CHLORIDE, CALCIUM CHLORIDE 600; 310; 30; 20 MG/100ML; MG/100ML; MG/100ML; MG/100ML
INJECTION, SOLUTION INTRAVENOUS
Status: DISCONTINUED | OUTPATIENT
Start: 2025-04-14 | End: 2025-04-14 | Stop reason: SDUPTHER

## 2025-04-14 RX ORDER — PANTOPRAZOLE SODIUM 40 MG/1
40 TABLET, DELAYED RELEASE ORAL
Status: DISCONTINUED | OUTPATIENT
Start: 2025-04-15 | End: 2025-04-14 | Stop reason: HOSPADM

## 2025-04-14 RX ORDER — DEXAMETHASONE SODIUM PHOSPHATE 10 MG/ML
INJECTION, SOLUTION INTRA-ARTICULAR; INTRALESIONAL; INTRAMUSCULAR; INTRAVENOUS; SOFT TISSUE
Status: DISCONTINUED | OUTPATIENT
Start: 2025-04-14 | End: 2025-04-14 | Stop reason: SDUPTHER

## 2025-04-14 RX ORDER — DEXAMETHASONE SODIUM PHOSPHATE 10 MG/ML
4 INJECTION, SOLUTION INTRAMUSCULAR; INTRAVENOUS ONCE
Status: COMPLETED | OUTPATIENT
Start: 2025-04-14 | End: 2025-04-14

## 2025-04-14 RX ORDER — PHENYLEPHRINE HCL IN 0.9% NACL 1 MG/10 ML
SYRINGE (ML) INTRAVENOUS
Status: DISCONTINUED | OUTPATIENT
Start: 2025-04-14 | End: 2025-04-14 | Stop reason: SDUPTHER

## 2025-04-14 RX ORDER — CLOPIDOGREL BISULFATE 75 MG/1
75 TABLET ORAL DAILY
Status: DISCONTINUED | OUTPATIENT
Start: 2025-04-14 | End: 2025-04-14 | Stop reason: HOSPADM

## 2025-04-14 RX ORDER — ONDANSETRON 4 MG/1
4 TABLET, ORALLY DISINTEGRATING ORAL EVERY 8 HOURS PRN
Status: DISCONTINUED | OUTPATIENT
Start: 2025-04-14 | End: 2025-04-14 | Stop reason: HOSPADM

## 2025-04-14 RX ORDER — DIPHENHYDRAMINE HYDROCHLORIDE 50 MG/ML
12.5 INJECTION, SOLUTION INTRAMUSCULAR; INTRAVENOUS
Status: DISCONTINUED | OUTPATIENT
Start: 2025-04-14 | End: 2025-04-14 | Stop reason: HOSPADM

## 2025-04-14 RX ORDER — BUPIVACAINE HYDROCHLORIDE 7.5 MG/ML
INJECTION, SOLUTION INTRASPINAL
Status: DISCONTINUED | OUTPATIENT
Start: 2025-04-14 | End: 2025-04-14 | Stop reason: SDUPTHER

## 2025-04-14 RX ORDER — LISINOPRIL 20 MG/1
20 TABLET ORAL DAILY
Status: DISCONTINUED | OUTPATIENT
Start: 2025-04-14 | End: 2025-04-14 | Stop reason: HOSPADM

## 2025-04-14 RX ORDER — ASPIRIN 81 MG/1
81 TABLET ORAL DAILY
Qty: 30 TABLET | Refills: 0 | Status: SHIPPED | OUTPATIENT
Start: 2025-04-14

## 2025-04-14 RX ADMIN — SODIUM CHLORIDE: 9 INJECTION, SOLUTION INTRAVENOUS at 05:22

## 2025-04-14 RX ADMIN — EPHEDRINE SULFATE 7.5 MG: 5 INJECTION INTRAVENOUS at 08:18

## 2025-04-14 RX ADMIN — Medication 100 MCG: at 07:52

## 2025-04-14 RX ADMIN — LISINOPRIL 20 MG: 20 TABLET ORAL at 15:18

## 2025-04-14 RX ADMIN — Medication 2000 UNITS: at 15:18

## 2025-04-14 RX ADMIN — EPHEDRINE SULFATE 5 MG: 5 INJECTION INTRAVENOUS at 07:59

## 2025-04-14 RX ADMIN — ONDANSETRON 4 MG: 2 INJECTION, SOLUTION INTRAMUSCULAR; INTRAVENOUS at 07:52

## 2025-04-14 RX ADMIN — BUPIVACAINE HYDROCHLORIDE IN DEXTROSE 1.4 ML: 7.5 INJECTION, SOLUTION SUBARACHNOID at 07:11

## 2025-04-14 RX ADMIN — WATER 2000 MG: 1 INJECTION INTRAMUSCULAR; INTRAVENOUS; SUBCUTANEOUS at 15:19

## 2025-04-14 RX ADMIN — SODIUM CHLORIDE, POTASSIUM CHLORIDE, SODIUM LACTATE AND CALCIUM CHLORIDE: 600; 310; 30; 20 INJECTION, SOLUTION INTRAVENOUS at 08:14

## 2025-04-14 RX ADMIN — EPHEDRINE SULFATE 7.5 MG: 5 INJECTION INTRAVENOUS at 08:05

## 2025-04-14 RX ADMIN — Medication 100 MCG: at 07:45

## 2025-04-14 RX ADMIN — ROPIVACAINE HYDROCHLORIDE 20 ML: 5 INJECTION EPIDURAL; INFILTRATION; PERINEURAL at 06:55

## 2025-04-14 RX ADMIN — SODIUM CHLORIDE: 9 INJECTION, SOLUTION INTRAVENOUS at 07:00

## 2025-04-14 RX ADMIN — CEFAZOLIN 2000 MG: 2 INJECTION, POWDER, FOR SOLUTION INTRAMUSCULAR; INTRAVENOUS at 07:12

## 2025-04-14 RX ADMIN — PROPOFOL 80 MCG/KG/MIN: 10 INJECTION, EMULSION INTRAVENOUS at 07:17

## 2025-04-14 RX ADMIN — DEXAMETHASONE SODIUM PHOSPHATE 4 MG: 10 INJECTION, SOLUTION INTRAMUSCULAR; INTRAVENOUS at 06:55

## 2025-04-14 RX ADMIN — MIDAZOLAM 1 MG: 1 INJECTION INTRAMUSCULAR; INTRAVENOUS at 06:54

## 2025-04-14 RX ADMIN — SODIUM CHLORIDE, PRESERVATIVE FREE 10 ML: 5 INJECTION INTRAVENOUS at 12:04

## 2025-04-14 RX ADMIN — EPHEDRINE SULFATE 5 MG: 5 INJECTION INTRAVENOUS at 08:10

## 2025-04-14 RX ADMIN — ASPIRIN 81 MG: 81 TABLET, COATED ORAL at 12:02

## 2025-04-14 RX ADMIN — DEXAMETHASONE SODIUM PHOSPHATE 10 MG: 10 INJECTION INTRAMUSCULAR; INTRAVENOUS at 07:52

## 2025-04-14 RX ADMIN — TRANEXAMIC ACID 1 G: 10 INJECTION, SOLUTION INTRAVENOUS at 07:18

## 2025-04-14 RX ADMIN — ACETAMINOPHEN 650 MG: 325 TABLET ORAL at 12:02

## 2025-04-14 RX ADMIN — LIDOCAINE HYDROCHLORIDE 1 ML: 10 INJECTION, SOLUTION INFILTRATION; PERINEURAL at 06:54

## 2025-04-14 ASSESSMENT — PAIN - FUNCTIONAL ASSESSMENT
PAIN_FUNCTIONAL_ASSESSMENT: ADULT NONVERBAL PAIN SCALE (NPVS)
PAIN_FUNCTIONAL_ASSESSMENT: NONE - DENIES PAIN

## 2025-04-14 ASSESSMENT — PAIN SCALES - GENERAL
PAINLEVEL_OUTOF10: 0
PAINLEVEL_OUTOF10: 0

## 2025-04-14 ASSESSMENT — PAIN DESCRIPTION - DESCRIPTORS: DESCRIPTORS: ACHING;DISCOMFORT;SORE

## 2025-04-14 ASSESSMENT — PAIN DESCRIPTION - LOCATION: LOCATION: LEG

## 2025-04-14 ASSESSMENT — PAIN DESCRIPTION - ORIENTATION: ORIENTATION: RIGHT

## 2025-04-14 NOTE — CARE COORDINATION
04/14/25 Transition of care: patient is same day total knee replacement and is being discharged to home. He resides in an apartment on the first floor. He lives alone. Son is here from [place]. He will be staying for two weeks with patient. He wishes to use [facility] but they do not take the insurance. Referral sent to [facility]. Referral to [facility] for wheeled walker, Sister will obtain shower bench for patient. He follows with Dr Candelario and uses the [facility] in [place]. Family is at the bedside and will transport patient home today.Electronically signed by Mary Martinez RN CM on 4/14/2025 at 2:18 PM

## 2025-04-14 NOTE — PLAN OF CARE
Problem: Chronic Conditions and Co-morbidities  Goal: Patient's chronic conditions and co-morbidity symptoms are monitored and maintained or improved  Outcome: Completed     Problem: Discharge Planning  Goal: Discharge to home or other facility with appropriate resources  Outcome: Completed     Problem: Safety - Adult  Goal: Free from fall injury  Outcome: Completed     Problem: ABCDS Injury Assessment  Goal: Absence of physical injury  Outcome: Completed     Problem: Pain  Goal: Verbalizes/displays adequate comfort level or baseline comfort level  Outcome: Completed

## 2025-04-14 NOTE — CONSULTS
FAMILY MEDICINE           Consult Note    Reason for Consult:  Medication Management  Requesting Physician:  Darnell Lema DO  Date of Service: 4/14/2025   Chief Complaint:  Osteoarthritis of Rt Knee s/p Rt knee total arthroplasty  History Obtained From:  patient    History of Present Ilness:    This is a 76 y/o male patient with a PMHx of HTN. HLD, CHF, CKD, CAD, STEMI s/p PCI in 2018, OA who presented to the hospital for a scheduled Rt Knee total Arthroplasty s/p failure of multiple conservative treatments. Non-operative attempts at management with therapy, CSI, oral analgesics were unsuccessful. Knee pain worsened over the last 6 months causing significant restrictions with performing ADLs.    Patient seen and evaluated at bedside with no acute complaints or concerns. States that he is doing overall well and tolerated his surgery well. He denies any chest, abdominal or extremity pain. He denies any fever, chills, SOB, n/v, weakness or any neurologic symptoms.     Past Medical History:        Diagnosis Date    Bilateral leg and foot pain     CAD (coronary artery disease)     follows with Dr Godinez    CKD (chronic kidney disease), stage III ([facility]) 2/26/2019    Hypertension     Nasal mass 11/2020    STEMI (ST elevation myocardial infarction) ([facility]) 10/23/2018       Past Surgical History:        Procedure Laterality Date    CORONARY ANGIOPLASTY WITH STENT PLACEMENT  10/23/2018    3.5 x 26 Resolute José to mid RCA by Dr. Godinez    NOSE SURGERY N/A 11/16/2020    EXCISION RIGHT NASAL LESION WITH RECONSTRUCTION WITH FROZEN SECTION performed by Ralph Lopez DO at [facility] OR       Current Medications:    Current Facility-Administered Medications: sodium chloride flush 0.9 % injection 5-40 mL, 5-40 mL, IntraVENous, 2 times per day  sodium chloride flush 0.9 % injection 5-40 mL, 5-40 mL, IntraVENous, PRN  0.9 % sodium chloride infusion, , IntraVENous, PRN  oxyCODONE (ROXICODONE) immediate release tablet 5 mg, 5 mg, Oral,  B without w/r/r  Abdomen:  +bs, soft, nt, nd  Ext: No lower extremity edema, RLE wrapped in ACE bandage, minimal TTP  Psychiatric: mood and affect appropriate  Musculoskeletal:  Rom, muscular strength intact    Data:   WBC:    Lab Results   Component Value Date/Time    WBC 8.1 04/08/2025 10:00 AM     Platelets:    Lab Results   Component Value Date/Time     04/08/2025 10:00 AM     Hemoglobin/Hematocrit:    Lab Results   Component Value Date/Time    HGB 12.7 04/08/2025 10:00 AM    HCT 39.4 04/08/2025 10:00 AM     BMP:    Lab Results   Component Value Date/Time     04/08/2025 10:00 AM    K 4.5 04/08/2025 10:00 AM    K 4.6 11/21/2021 10:48 AM     04/08/2025 10:00 AM    CO2 20 04/08/2025 10:00 AM    BUN 18 04/08/2025 10:00 AM    CREATININE 1.3 04/08/2025 10:00 AM    CALCIUM 9.5 04/08/2025 10:00 AM    GFRAA 56 07/27/2022 06:09 AM    LABGLOM 57 04/08/2025 10:00 AM    LABGLOM 59 01/30/2024 06:01 AM     Albumin:  No results found for: \"LABALBU\"  Ionized Calcium:  No components found for: \"IONCA\"  Magnesium:    Lab Results   Component Value Date/Time    MG 2.1 02/04/2025 06:16 AM     Phosphorus:    Lab Results   Component Value Date/Time    PHOS 3.2 02/04/2025 06:16 AM     LDH:    Lab Results   Component Value Date/Time     11/18/2021 12:38 AM     11/18/2021 12:38 AM     Uric Acid:  No components found for: \"URIC\"  U/A:  No results found for: \"NITRITE\", \"COLORU\", \"PHUR\", \"LABCAST\", \"WBCUA\", \"RBCUA\", \"MUCUS\", \"TRICHOMONAS\", \"YEAST\", \"BACTERIA\", \"CLARITYU\", \"SPECGRAV\", \"LEUKOCYTESUR\", \"UROBILINOGEN\", \"BILIRUBINUR\", \"BLOODU\", \"GLUCOSEU\", \"AMORPHOUS\"  IRON:    Lab Results   Component Value Date/Time    IRON 73 02/04/2025 06:16 AM     Iron Saturation:  No components found for: \"PERCENTFE\"  TIBC:    Lab Results   Component Value Date/Time    TIBC 280 02/04/2025 06:16 AM     FERRITIN:    Lab Results   Component Value Date/Time    FERRITIN 89 02/04/2025 06:16 AM     KOFFI:  No results found for:

## 2025-04-14 NOTE — CARE COORDINATION
04/14/25 Update CM Note: patient now refuses the wheeled walker. Electronically signed by Mary Martinez RN CM on 4/14/2025 at 3:21 PM

## 2025-04-14 NOTE — ANESTHESIA PRE PROCEDURE
Department of Anesthesiology  Preprocedure Note       Name:  Raffy Oseguera   Age:  75 y.o.  :  1950                                          MRN:  [id number]         Date:  2025      Surgeon: Surgeon(s):  Jorge Troncoso MD    Procedure: Procedure(s):  RIGHT TOTAL KNEE ARTHROPLASTY **BLOCK NEEDED**    Medications prior to admission:   Prior to Admission medications    Medication Sig Start Date End Date Taking? Authorizing Provider   famotidine (PEPCID) 20 MG tablet Take 1 tablet by mouth 2 times daily 3/4/25  Yes Hansel Nagy MD   atorvastatin (LIPITOR) 40 MG tablet Take 1 tablet by mouth once daily 2/10/25  Yes Hansel Nagy MD   lisinopril (PRINIVIL;ZESTRIL) 20 MG tablet Take 1 tablet by mouth daily 1/3/25  Yes Carlos Godinez MD   metoprolol succinate (TOPROL XL) 50 MG extended release tablet Take 1 tablet by mouth in the morning and at bedtime 1/3/25  Yes Carlos Godinez MD   amLODIPine (NORVASC) 10 MG tablet Take 1 tablet by mouth daily 1/3/25  Yes Carlos Godinez MD   isosorbide mononitrate (IMDUR) 30 MG extended release tablet Take 1 tablet by mouth daily 1/3/25  Yes Carlos Godinez MD   clopidogrel (PLAVIX) 75 MG tablet Take 1 tablet by mouth daily 1/3/25   Carlos Godinez MD   nitroGLYCERIN (NITROSTAT) 0.4 MG SL tablet Place 1 tablet under the tongue every 5 minutes as needed for Chest pain 10/20/23   Hansel Nagy MD   Cholecalciferol (VITAMIN D3) 50 MCG (2000) CAPS Take 1 capsule by mouth daily Ld     Provider, MD Tegan   aspirin 81 MG chewable tablet Take 1 tablet by mouth daily 18   Rick Blanc DO       Current medications:    Current Facility-Administered Medications   Medication Dose Route Frequency Provider Last Rate Last Admin    sodium chloride flush 0.9 % injection 5-40 mL  5-40 mL IntraVENous 2 times per day Migue Cordero PA-C        sodium chloride flush 0.9 % injection 5-40 mL  5-40 mL IntraVENous PRN

## 2025-04-14 NOTE — PROGRESS NOTES
OCCUPATIONAL THERAPY INITIAL EVALUATION    [facility]  [address], [place]        Date:2025                                                  Patient Name: Raffy Oseguera    MRN: [id number]    : 1950    Room: [address] [place] [address]      Evaluating OT: Shahida Rush OTR/L; [id number]       Referring Provider: Darnell Lema DO     Specific Provider Orders/Date: OT Eval and Treat 25 1045       Diagnosis: Osteoarthritis of right knee;  S/P total knee arthroplasty, right.    Surgery: 25 Right Knee Total Arthroplasty.     Pertinent Medical History:  has a past medical history of Bilateral leg and foot pain, CAD (coronary artery disease), CKD (chronic kidney disease), stage III ([facility]), Hypertension, Nasal mass, and STEMI (ST elevation myocardial infarction) ([facility]).     Recommended Adaptive Equipment: AE for LE bathing and dressing PRN     Precautions:  Fall Risk, +alarms, WBAT RLE, R TKA precautions     Assessment of current deficits    [x] Functional mobility  [x]ADLs  [x] Strength               []Cognition    [x] Functional transfers   [x] IADLs         [x] Safety Awareness   [x]Endurance    [] Fine Coordination              [x] Balance      [] Vision/perception   []Sensation     []Gross Motor Coordination  [] ROM  [] Delirium                   [] Motor Control     OT PLAN OF CARE   OT POC based on physician orders, patient diagnosis and results of clinical assessment    Frequency/Duration 1-3 days/wk for 2 weeks PRN   Specific OT Treatment Interventions to include:   * Instruction/training on adapted ADL techniques and AE recommendations to increase functional independence within precautions       * Training on energy conservation strategies, correct breathing pattern and techniques to improve independence/tolerance for self-care routine  * Functional transfer/mobility training/DME recommendations for increased independence, safety,  Assist (HOB elevated)  Sit to supine: NT   Supine to sit: Independent   Sit to supine: Independent    Functional Transfers Sit to stand:Minimal Assist   Stand to sit:Minimal Assist   Stand pivot: Minimal Assist w/ ww  Commode: NT   Sit to stand: Modified [place]   Stand to sit: Modified [place]   Stand pivot:  Modified [place]   Commode:  Modified [place]    Functional Mobility Minimal Assist   Use of ww to<>from hallway  Modified [place] w/ use of Appropriate AD   Balance Sitting:     Static:  Stand by Assist     Dynamic:Stand by Assist     Standing:    Static:  Minimal Assist w/ ww    Dynamic:Minimal Assist w/ ww  Sitting:     Static:  Independent     Dynamic:Independent     Standing:    Static:  Modified [place]     Dynamic:Modified [place]    Activity Tolerance fair + w/ light activity  good  w/ light to mod activity   Visual/  Perceptual Glasses: Yes - readers.  WFL     Safety fair +  good   during ADL completion following R TKA precautions     Hand Dominance Right   AROM (PROM) Strength Additional Info:  Goal: (PRN)   RUE  WFL 4/5 grossly tested good  and good  FMC/dexterity noted during ADL tasks Improve overall RUE strength WFL for participation in functional tasks       LUE WFL 4/5 grossly tested good   and good  FMC/dexterity noted during ADL tasks Improve overall LUE strength WFL for participation in functional tasks       Hearing: WFL   Sensation: chronic BLE paraesthesia  Tone: WFL  Edema: Unremarkable    Comment: Cleared by RN to see pt. Upon arrival patient in semi-dodd's and agreeable to OT session. At end of session, patient seated in bedside chair with +chair alarm, call light and phone within reach, all lines and tubes intact.  Overall patient demonstrated decreased independence, activity tolerance, standing balance and safety during completion of ADL/functional transfer/mobility tasks. Therapist facilitated ADL tasks, functional transfers, functional

## 2025-04-14 NOTE — TELEPHONE ENCOUNTER
KOKO HAD SURGERY AND WAS TOLD TO HOLD PLAVIX 1 WEEK PRIOR TO THE SURGERY BUT TO REMAIN ON HIS ASPIRIN.  HIS PCP AND SURGEON ASKED IF HE COULD JUST STOP THE PLAVIX.    PER DR GUZMAN   HE CAN DISCONTINUE THE PLAVIX BUT MUST REMAIN ON ASPIRIN.    I CALLED HIS SON HELIO AND LEFT THE ABOVE MESSAGE.  REINALDO

## 2025-04-14 NOTE — PROGRESS NOTES
Messaged  Dr Lema to inquire on possible discharge today. Patient was seen by PT/OT. Patient being set up for HHC by CM.

## 2025-04-14 NOTE — ANESTHESIA POSTPROCEDURE EVALUATION
Department of Anesthesiology  Postprocedure Note    Patient: Raffy Oseguera  MRN: [id number]  YOB: 1950  Date of evaluation: 4/14/2025    Procedure Summary       Date: 04/14/25 Room / Location: [address]    Anesthesia Start: 0700 Anesthesia Stop: 0905    Procedure: Right Knee Total Arthroplasty (Right: Knee) Diagnosis:       Osteoarthritis of right knee      (Osteoarthritis of right knee [M17.11])    Surgeons: Jorge Troncoso MD Responsible Provider: Rui Palm MD    Anesthesia Type: Spinal ASA Status: 3            Anesthesia Type: Spinal    Jamie Phase I: Jamie Score: 10    Jamie Phase II:      Anesthesia Post Evaluation    Patient location during evaluation: PACU  Patient participation: complete - patient participated  Level of consciousness: awake and alert  Airway patency: patent  Nausea & Vomiting: no nausea and no vomiting  Cardiovascular status: hemodynamically stable  Respiratory status: acceptable  Hydration status: euvolemic  Multimodal analgesia pain management approach  Pain management: adequate    No notable events documented.

## 2025-04-14 NOTE — DISCHARGE INSTRUCTIONS
[facility] Department of Orthopedic Surgery  [address] [facility] [address]    Dr. Sudeep Schmitt, DO         MD Dr. Sudeep Mitchell MD Frank Ansevin, PA-C Sara Zatchok, PA-C Tyler Tsangaris PA-C      Orthopaedics Discharge Instructions   Weight bearing Status - Weight bearing as tolerated - on right lower Extremity  Pain Medication   Contact Office for Medication Refill- [phone]  Office can refill pain medications no sooner than every 7 days  If patient discharging to facility then pain control will be continued per facility physician  Ice to operative/injured site for 15-30 minutes of each hour for next 5 days    Recommend that you continue to ice the area 2-3 times per day after this   Elevate operative/injured limb on 2 pillows at home  Goal is to have limb above the heart if able  Continue DVT Prophylaxis (blood clot prevention) as prescribed: aspirin   Wound care - ***  Fracture Care -  ***    Follow up in office in approximately 2 weeks. Your first follow up appointment is often with one of our physician assistants.     Call the office at [phone] if having any concerns.     Watch for these significant complications.  Call physician if they or any other problems occur:  Fever over 101°, redness, swelling or warmth at the operative site  Unrelieved nausea    Foul smelling or cloudy drainage at the operative site   Unrelieved pain    Blood soaked dressing. (Some oozing may be normal)     Numb, pale, blue, cold or tingling extremity          It is the Department of Orthopaedic Trauma's standard of practice that providers will de-escalate (wean) all patients from narcotic (opioid) medications during the post-operative period.   We provide multimodal pain control, but opioid medications are tapered in all of our patients.  If patient requires referral to pain management for prolonged taper from opioid pain medication, we will facilitate this process.        Weight

## 2025-04-14 NOTE — ANESTHESIA PROCEDURE NOTES
Peripheral Block    Patient location during procedure: pre-op  Reason for block: post-op pain management  Start time: 4/14/2025 6:54 AM  Staffing  Performed: anesthesiologist   Anesthesiologist: Rui Palm MD  Performed by: Rui Palm MD  Authorized by: Rui Palm MD    Preanesthetic Checklist  Completed: patient identified, IV checked, site marked, risks and benefits discussed, surgical/procedural consents, equipment checked, pre-op evaluation, timeout performed, anesthesia consent given, oxygen available and monitors applied/VS acknowledged  Peripheral Block   Patient position: supine  Prep: ChloraPrep  Provider prep: mask and sterile gloves  Patient monitoring: cardiac monitor, continuous pulse ox, frequent blood pressure checks and IV access  Block type: Femoral  Adductor canal  Laterality: right  Injection technique: single-shot  Guidance: ultrasound guided    Needle   Needle type: insulated echogenic nerve stimulator needle   Needle gauge: 22 G  Needle localization: ultrasound guidance  Needle length: 10 cm  Assessment   Injection assessment: negative aspiration for heme, no paresthesia on injection, local visualized surrounding nerve on ultrasound and no intravascular symptoms  Paresthesia pain: none  Slow fractionated injection: yes  Hemodynamics: stable  Outcomes: uncomplicated and patient tolerated procedure well

## 2025-04-14 NOTE — PROGRESS NOTES
Pharmacist Review and Automatic Dose Adjustment of Prophylactic Enoxaparin    The reviewing pharmacist has made an adjustment to the ordered enoxaparin dose or converted to UFH per the approved [facility] protocol and table as identified below.      Raffy Oseguera is a 75 y.o. male.     No results for input(s): \"CREATININE\" in the last 72 hours.    Estimated Creatinine Clearance: 56 mL/min (A) (based on SCr of 1.3 mg/dL (H)).    No results for input(s): \"HGB\", \"HCT\", \"PLT\" in the last 72 hours.  No results for input(s): \"INR\" in the last 72 hours.    Height:   Ht Readings from Last 1 Encounters:   04/14/25 1.727 m (5' 8\")     Weight:  Wt Readings from Last 1 Encounters:   04/14/25 98.9 kg (218 lb)         Plan: Based upon the patient's weight and renal function    Ordered: Enoxaparin 30mg SUBQ Daily    Changed/converted to    New Order: Enoxaparin 40mg SUBQ Daily    Thank you,    Britt Sosa, [facility]  4/14/2025, 1:11 PM

## 2025-04-14 NOTE — OP NOTE
Operative Note      Patient: Raffy Oseguera  YOB: 1950  MRN: [id number]    Date of Procedure: 4/14/2025    Preoperative diagnosis:  Right knee severe tricompartmental osteoarthritis, varus wear pattern    Post-Op Diagnosis: Same       Procedure(s):  Right Knee Total Arthroplasty    Surgeon(s):  Jorge Troncoso MD    Assistant:   Resident: Sukhwinder Yeh DO; Darnell Lema DO    Anesthesia: General    Estimated Blood Loss (mL): Minimal    Complications: None    Specimens:   ID Type Source Tests Collected by Time Destination   A : Right Knee Remnants Tissue Tissue SURGICAL PATHOLOGY Jorge Troncoso MD 4/14/2025 0751        Implants:  Implant Name Type Inv. Item Serial No.  Lot No. LRB No. Used Action   INSERT TIB CNDYL STBL 6 9 MM KNEE 5/PK X3 TRIATHLON - [id number]  INSERT TIB CNDYL STBL 6 9 MM KNEE 5/PK X3 TRIATHLON  [employer] [id number] Right 1 Implanted   COMPONENT FEM SZ 5 R ANT KNEE CRUCE RET MITZY REFERENCING - [id number]  COMPONENT FEM SZ 5 R ANT KNEE CRUCE RET MITZY REFERENCING  [employer] [id number] Right 1 Implanted   BASEPLATE TIB SZ 6 KNEE TRITANIUM MITZY XIAO LO PROF TRIATHLON - [id number]  BASEPLATE TIB SZ 6 KNEE TRITANIUM MITZY XIAO LO PROF TRIATHLON  [employer] [id number] Right 1 Implanted   IMPLANT PATELLAR [id number] X3 ASYMMETRIC TRIATHLON - [id number]  IMPLANT PATELLAR [id number] X3 ASYMMETRIC TRIATHLON  [employer] NYVD Right 1 Implanted         Drains: * No LDAs found *    Findings:  Infection Present At Time Of Surgery (PATOS) (choose all levels that have infection present):  No infection present  Other Findings: Utilized a Sandra triathlon size #5 femoral component, cruciate retaining, size #6 tibial baseplate, primary, 9 mm deep dish polyethylene liner, and an A32 patella    Detailed Description of Procedure:          The patient was seen and identified outside the operative suite, in  were checked with a spacer.  Once appropriate the trial baseplate was pinned in position.  A trial polyethylene 9 mm liner was pinned in position.  The femoral trial was pinned in position.  The knee was taken through a range of motion checking patellar tracking, and medial and lateral gaps at both full extension and 30 degrees of flexion.  Once appropriate size poly-was chosen the patella was then cut leaving 12 to 14 mm.  The appropriate sized jig was placed for k an asymmetric patella.  The cut was made the holes were drilled n and the trial was pinned in position.  The entire knee was re-trialed which checked out very nicely.  At this point time trial was removed, the femur was drilled and the tibia was punched.  Copious irrigation was carried out along with a periarticular injection.  The knee was then meticulously dried.  The final tibial component was cemented in position with excess cement removed.  It was impacted with a final impacted with further excess cement removed.  The polyethylene liner was clipped and the position was checked for security.  The final femur was impacted into position with excess cement removed and placed into extension with further excess cement removed.  The final patellar component was cemented into position with all excess cement removed.  The knee was then allowed to dry with a bolster underneath the heel.  During this time it was copiously irrigated and a Betadine shock was performed.  The final components included a size number #5 femur, a size#6 tibial baseplate with a 9 millimeter polyethylene liner and a A32 patellar component.  After the final irrigation the capsule was closed with Ethibond suture in a barbed suture for watertight closure.  Subcutaneous tissues closed with 2-0 Monocryl and the skin was closed with 3-0 nylon.  The patient was taken to the PACU in stable condition.  PRP was injected prior to closure to help with the soft tissue healing.      Disposition: The

## 2025-04-14 NOTE — PROGRESS NOTES
Physical Therapy  Physical Therapy Initial Assessment     Name: Raffy Oseguera  : 1950  MRN: [id number]      Date of Service: 2025    Evaluating PT:  Jose Singh, PT, DPT [id number]    Room #:  5212/5212-A  Diagnosis:  Osteoarthritis of right knee [M17.11]  S/P total knee arthroplasty, right [Z96.651]  PMHx/PSHx:    Past Medical History:   Diagnosis Date    Bilateral leg and foot pain     CAD (coronary artery disease)     follows with Dr Godinez    CKD (chronic kidney disease), stage III ([facility]) 2019    Hypertension     Nasal mass 2020    STEMI (ST elevation myocardial infarction) ([facility]) 10/23/2018     Procedure/Surgery:   Right Knee Total Arthroplasty   Precautions:  Falls, WBAT RLE, alarms  Equipment Needs:  front WW    SUBJECTIVE:    Pt lives alone in a 1st floor apartment with level entry vs 1 curb step.     Pt ambulated with SPC PRN and was independent PTA.  Pt's son will be staying to assist pt.    OBJECTIVE:   Initial Evaluation  Date: 25 Treatment Short Term/ Long Term   Goals   AM-PAC 6 Clicks      Was pt agreeable to Eval/treatment? Yes     Does pt have pain? 5/10 R knee pain     Bed Mobility  Rolling: NT  Supine to sit: SBA with HOB elevated  Sit to supine: NT  Scooting: SBA  Mod Independent   Transfers Sit to stand: Aravind  Stand to sit: Aravind  Stand pivot: Aravind with WW  Mod Independent with WW   Ambulation   90 feet with Aravind with WW  >400 feet with Mod Independent with WW   Stair negotiation: ascended and descended NT  >4 steps with 1 rail Mod Independent   ROM BUE:  WFL  LLE:  WFL  RLE:  limited in flexion and extension     Strength BUE:  WFL  LLE:  4+/5  RLE:  4-/5  Increase by 1/3 MMT grade   Balance Sitting EOB:  SBA  Dynamic Standing:  Aravind with WW  Sitting EOB:  Independent  Dynamic Standing:  Mod Independent with WW     Pt is A & O x 4  Sensation:  chronic paresthesias  Edema:  none    Therapeutic Exercises:  instructed on quad sets and LAQs    Patient education  Pt

## 2025-04-14 NOTE — PROGRESS NOTES
4 Eyes Skin Assessment     NAME:  Raffy Oseguera  YOB: 1950  MEDICAL RECORD NUMBER:  [id number]    The patient is being assessed for  Post-Op Surgical    I agree that at least one RN has performed a thorough Head to Toe Skin Assessment on the patient. ALL assessment sites listed below have been assessed.      Areas assessed by both nurses:    Head, Face, Ears, Shoulders, Back, Chest, Arms, Elbows, Hands, Sacrum. Buttock, Coccyx, Ischium, Legs. Feet and Heels, and Under Medical Devices   Surgical incision to Right knee- ace wrapped         Does the Patient have a Wound? No noted wound(s)       Roger Prevention initiated by RN: No  Wound Care Orders initiated by RN: No    Pressure Injury (Stage 3,4, Unstageable, DTI, NWPT, and Complex wounds) if present, place Wound referral order by RN under : No    New Ostomies, if present place, Ostomy referral order under : No     Pt scrotum's swollen. Scratches on LLE.     Nurse 1 eSignature: Electronically signed by Eileen Watkins RN on 4/14/25 at 12:20 PM EDT    **SHARE this note so that the co-signing nurse can place an eSignature**    Nurse 2 eSignature: Electronically signed by Cele Orta RN on 4/14/25 at 12:35 PM EDT

## 2025-04-14 NOTE — INTERVAL H&P NOTE
Update History & Physical    The patient's History and Physical of March 21, 2025 was reviewed with the patient and I examined the patient. There was no change. The surgical site was confirmed by the patient and me.     Plan: The risks, benefits, expected outcome, and alternative to the recommended procedure have been discussed with the patient. Patient understands and wants to proceed with the procedure.     Electronically signed by Jorge Troncoso MD on 4/14/2025 at 6:45 AM

## 2025-04-14 NOTE — ANESTHESIA PROCEDURE NOTES
Spinal Block    Patient location during procedure: OR  Reason for block: primary anesthetic  Staffing  Resident/CRNA: Caterina Persaud APRN - CRNA  Performed by: Allison Downs APRN - CRNA  Authorized by: Rui Palm MD    Spinal Block  Patient position: sitting  Prep: ChloraPrep  Patient monitoring: continuous pulse ox and frequent blood pressure checks  Approach: midline  Location: L3/L4  Provider prep: mask and sterile gloves  Local infiltration: lidocaine  Needle  Needle type: Pencan   Needle gauge: 25 G  Assessment  Sensory level: T6  Swirl obtained: Yes  Attempts: 1  Hemodynamics: stable  Preanesthetic Checklist  Completed: patient identified, IV checked, site marked, risks and benefits discussed, surgical/procedural consents, equipment checked, pre-op evaluation, timeout performed, anesthesia consent given, oxygen available, monitors applied/VS acknowledged, fire risk safety assessment completed and verbalized and blood product R/B/A discussed and consented

## 2025-04-22 DIAGNOSIS — M17.11 OSTEOARTHRITIS OF RIGHT KNEE, UNSPECIFIED OSTEOARTHRITIS TYPE: Primary | ICD-10-CM

## 2025-04-22 DIAGNOSIS — Z96.651 S/P TOTAL KNEE ARTHROPLASTY, RIGHT: ICD-10-CM

## 2025-04-22 RX ORDER — OXYCODONE AND ACETAMINOPHEN 5; 325 MG/1; MG/1
1 TABLET ORAL EVERY 8 HOURS PRN
Qty: 21 TABLET | Refills: 0 | Status: SHIPPED | OUTPATIENT
Start: 2025-04-22 | End: 2025-04-29

## 2025-04-22 NOTE — TELEPHONE ENCOUNTER
Controlled Substance Monitoring:    Acute and Chronic Pain Monitoring:   RX Monitoring Periodic Controlled Substance Monitoring   4/22/2025  10:04 AM No signs of potential drug abuse or diversion identified.

## 2025-04-22 NOTE — TELEPHONE ENCOUNTER
Received call from caregiver requesting refill of Percocet 5/325 mg at [facility].    Date of Procedure: 4/14/2025     Preoperative diagnosis:  Right knee severe tricompartmental osteoarthritis, varus wear pattern     Post-Op Diagnosis: Same       Procedure(s):  Right Knee Total Arthroplasty    Weeks from Surgery: 1      Medication pended and routed to providers for decision and signature.    Future Appointments   Date Time Provider Department Center   4/28/2025  9:00 AM SCHEDULE, [facility]   9/25/2025  8:30 AM Hansel Nagy MD [facility] DEP   12/2/2025  8:00 AM [facility] DO [facility]       Electronically signed by Mame Gunn RN on 4/22/2025 at 10:02 AM

## 2025-04-23 LAB
ABO/RH: NORMAL
ANTIBODY IDENTIFICATION: NORMAL
ANTIBODY SCREEN: POSITIVE
ANTIGEN TYPE, PATIENT: NORMAL
ARM BAND NUMBER: NORMAL
BLOOD BANK DISPENSE STATUS: NORMAL
BLOOD BANK SAMPLE EXPIRATION: NORMAL
BPU ID: NORMAL
COMPONENT: NORMAL
CROSSMATCH RESULT: NORMAL
DAT IGG: NEGATIVE
SURGICAL PATHOLOGY REPORT: NORMAL
TRANSFUSION STATUS: NORMAL
UNIT DIVISION: 0

## 2025-04-28 ENCOUNTER — OFFICE VISIT (OUTPATIENT)
Dept: ORTHOPEDIC SURGERY | Age: 75
End: 2025-04-28
Payer: MEDICARE

## 2025-04-28 ENCOUNTER — HOSPITAL ENCOUNTER (OUTPATIENT)
Dept: GENERAL RADIOLOGY | Age: 75
Discharge: HOME | End: 2025-04-30
Payer: MEDICARE

## 2025-04-28 VITALS
OXYGEN SATURATION: 94 % | HEIGHT: 68 IN | HEART RATE: 79 BPM | DIASTOLIC BLOOD PRESSURE: 68 MMHG | RESPIRATION RATE: 20 BRPM | TEMPERATURE: 97.7 F | WEIGHT: 218.03 LBS | SYSTOLIC BLOOD PRESSURE: 135 MMHG | BODY MASS INDEX: 33.04 KG/M2

## 2025-04-28 DIAGNOSIS — M17.11 OSTEOARTHRITIS OF RIGHT KNEE, UNSPECIFIED OSTEOARTHRITIS TYPE: ICD-10-CM

## 2025-04-28 DIAGNOSIS — Z96.651 S/P TOTAL KNEE ARTHROPLASTY, RIGHT: Primary | ICD-10-CM

## 2025-04-28 PROCEDURE — 99024 POSTOP FOLLOW-UP VISIT: CPT | Performed by: PHYSICIAN ASSISTANT

## 2025-04-28 PROCEDURE — 73560 X-RAY EXAM OF KNEE 1 OR 2: CPT

## 2025-04-28 PROCEDURE — 99213 OFFICE O/P EST LOW 20 MIN: CPT | Performed by: PHYSICIAN ASSISTANT

## 2025-04-28 RX ORDER — ASPIRIN 81 MG/1
81 TABLET ORAL 2 TIMES DAILY
Qty: 60 TABLET | Refills: 0 | Status: SHIPPED | OUTPATIENT
Start: 2025-04-28

## 2025-04-28 RX ORDER — CEPHALEXIN 500 MG/1
500 CAPSULE ORAL 4 TIMES DAILY
Qty: 28 CAPSULE | Refills: 0 | Status: SHIPPED | OUTPATIENT
Start: 2025-04-28 | End: 2025-05-05

## 2025-04-28 RX ORDER — OXYCODONE AND ACETAMINOPHEN 5; 325 MG/1; MG/1
1 TABLET ORAL EVERY 12 HOURS PRN
Qty: 14 TABLET | Refills: 0 | Status: SHIPPED | OUTPATIENT
Start: 2025-04-28 | End: 2025-05-05

## 2025-04-28 NOTE — PROGRESS NOTES
recognition software.  Every effort has been made to ensure accuracy; however, inadvertent computerized transcription errors may be present.

## 2025-04-28 NOTE — PATIENT INSTRUCTIONS
Keflex sent to your pharmacy. Continue to monitor for any signs of infection. Call with any changes to your incision.     Removed staples  Steri strips should fall off in the shower at some point, if they do not fall off in 10 days, remove them  Dressing: Can remove dressing in 1-2 days then open to air  Can shower tomorrow over incision. NO Soaking or submerging incision in water until fully healed & all scabs are gone    WB:  Weight bearing as tolerated on right lower extremity    Therapy: Attend therapy following the Total Knee protocol at the 2 week david      DVT: Patient is not taking Plavix. Recommended aspirin twice per day. This was sent to your pharmacy  Pain control : Percocet sent to the pharmacy     Continue with ice to the injured extremity 2-3 times per day for swelling  If able continue with elevation and compression    Follow up in 1 week for a wound check. No xrays needed

## 2025-05-05 ENCOUNTER — OFFICE VISIT (OUTPATIENT)
Dept: ORTHOPEDIC SURGERY | Age: 75
End: 2025-05-05
Payer: MEDICARE

## 2025-05-05 DIAGNOSIS — Z96.651 S/P TOTAL KNEE ARTHROPLASTY, RIGHT: Primary | ICD-10-CM

## 2025-05-05 PROCEDURE — 99213 OFFICE O/P EST LOW 20 MIN: CPT | Performed by: PHYSICIAN ASSISTANT

## 2025-05-05 PROCEDURE — 99024 POSTOP FOLLOW-UP VISIT: CPT | Performed by: PHYSICIAN ASSISTANT

## 2025-05-05 RX ORDER — OXYCODONE AND ACETAMINOPHEN 5; 325 MG/1; MG/1
1 TABLET ORAL 2 TIMES DAILY PRN
Qty: 14 TABLET | Refills: 0 | Status: SHIPPED | OUTPATIENT
Start: 2025-05-05 | End: 2025-05-12

## 2025-05-05 NOTE — PATIENT INSTRUCTIONS
Weightbearing as tolerated right lower extremity.    Patient will be set up with therapy at [facility] in [place].    Continue aspirin twice daily for DVT prophylaxis until 28 days postop.    Keflex will be sent to your pharmacy for preventative measures.    Follow-up in 3 weeks for reevaluation and x-rays.    Call if any questions or concerns

## 2025-05-05 NOTE — PROGRESS NOTES
Chief Complaint   Patient presents with    Post-Op Check     3wk R TKA F/U Wound check. Pt ambulating with four point walker. Pt states intermittent pain.         OP:SURGEON: Dr. Jorge Troncoso MD  DATE OF PROCEDURE: 4-14-25  PROCEDURE: Right Knee Total Arthroplasty     POD: 3 weeks    Subjective:  Raffy Oseguera is following up from the above surgery. He is WBAT on right lower extremity. He ambulates with assistive device, walker.  Pain to extremity is none and is not taking prescribed pain medication. They denies numbness or tingling to the right lower extremity. Denies calf pain, chest pain, or shortness of breath.  Patient continues to use DVT prophylaxis, ASA 81 mg BID. Patient is not participating in therapy at this time.  He is doing well after surgery.  Presents today for a wound check.  Denies any fever, chills or night sweats.  Denies wound redness or drainage.  He just completed home therapy.     Review of Systems -  All pertinent positives/negative in HPI     Objective:    General: Alert and oriented X 3, normocephalic atraumatic, external ears and eye normal, sclera clear, no acute distress, respirations easy and unlabored with no audible wheezes, skin warm and dry, speech and dress appropriate for noted age, affect euthymic.    Extremity:  Right Lower Extremity  Skin clean dry and intact, without signs of infection   Incision well-healed, Steri-Strips removed  moderate edema noted to the knee area  Compartments supple throughout thigh and leg  Calf supple and not tender  negative Homans  Demonstrates active motion with ankle DF/PF  Knee AROM 3-90  States sensation intact to touch in sural, deep peroneal, superficial peroneal, saphenous, posterior tibial  nerve distributions to foot/ankle.  Palpable dorsalis pedis and posterior tibialis pulses, cap refill brisk in toes, foot warm/perfused.    There were no vitals taken for this visit.    XR:   Not taken today.    Assessment:   Diagnosis Orders   1.

## 2025-05-06 ENCOUNTER — TELEPHONE (OUTPATIENT)
Dept: ORTHOPEDIC SURGERY | Age: 75
End: 2025-05-06

## 2025-05-06 DIAGNOSIS — Z96.651 S/P TOTAL KNEE ARTHROPLASTY, RIGHT: Primary | ICD-10-CM

## 2025-05-06 NOTE — TELEPHONE ENCOUNTER
[facility] wanted to know if we can write a script to be seen 1 more time for homecare. She wants to go back out next Tuesday and the patient does not have enough visits.    Last OV: 05/05/2025 - s/p total knee arthroplasty right    Next OV: 06/02/2025    Sent to provider    Electronically signed by Susana Khalil RN on 5/6/2025 at 10:02 AM

## 2025-05-13 ENCOUNTER — HOSPITAL ENCOUNTER (EMERGENCY)
Age: 75
Discharge: HOME | End: 2025-05-13
Attending: STUDENT IN AN ORGANIZED HEALTH CARE EDUCATION/TRAINING PROGRAM
Payer: MEDICARE

## 2025-05-13 ENCOUNTER — TELEPHONE (OUTPATIENT)
Dept: ORTHOPEDIC SURGERY | Age: 75
End: 2025-05-13

## 2025-05-13 ENCOUNTER — APPOINTMENT (OUTPATIENT)
Dept: GENERAL RADIOLOGY | Age: 75
End: 2025-05-13
Payer: MEDICARE

## 2025-05-13 VITALS
SYSTOLIC BLOOD PRESSURE: 122 MMHG | HEART RATE: 75 BPM | OXYGEN SATURATION: 98 % | RESPIRATION RATE: 18 BRPM | TEMPERATURE: 97.2 F | DIASTOLIC BLOOD PRESSURE: 81 MMHG

## 2025-05-13 DIAGNOSIS — Z98.890 S/P KNEE SURGERY: ICD-10-CM

## 2025-05-13 DIAGNOSIS — M25.561 RIGHT KNEE PAIN, UNSPECIFIED CHRONICITY: Primary | ICD-10-CM

## 2025-05-13 LAB
ALBUMIN SERPL-MCNC: 4 G/DL (ref 3.5–5.2)
ALP SERPL-CCNC: 127 U/L (ref 40–129)
ALT SERPL-CCNC: <5 U/L (ref 0–50)
ANION GAP SERPL CALCULATED.3IONS-SCNC: 13 MMOL/L (ref 7–16)
APPEARANCE: ABNORMAL
AST SERPL-CCNC: 12 U/L (ref 0–50)
BASOPHILS # BLD: 0.03 K/UL (ref 0–0.2)
BASOPHILS NFR BLD: 0 % (ref 0–2)
BILIRUB SERPL-MCNC: 0.4 MG/DL (ref 0–1.2)
BODY FLD TYPE: ABNORMAL
BUN SERPL-MCNC: 20 MG/DL (ref 8–23)
CALCIUM SERPL-MCNC: 9.7 MG/DL (ref 8.8–10.2)
CHLORIDE SERPL-SCNC: 108 MMOL/L (ref 98–107)
CLOT CHECK: ABNORMAL
CO2 SERPL-SCNC: 21 MMOL/L (ref 22–29)
COLOR FLUID: ABNORMAL
CREAT SERPL-MCNC: 1.2 MG/DL (ref 0.7–1.2)
CRP SERPL HS-MCNC: 3.6 MG/L (ref 0–5)
EOSINOPHIL # BLD: 0.16 K/UL (ref 0.05–0.5)
EOSINOPHILS RELATIVE PERCENT: 2 % (ref 0–6)
ERYTHROCYTE [DISTWIDTH] IN BLOOD BY AUTOMATED COUNT: 14.5 % (ref 11.5–15)
ERYTHROCYTE [SEDIMENTATION RATE] IN BLOOD BY WESTERGREN METHOD: 37 MM/HR (ref 0–15)
GFR, ESTIMATED: 66 ML/MIN/1.73M2
GLUCOSE SERPL-MCNC: 122 MG/DL (ref 74–99)
HCT VFR BLD AUTO: 32.2 % (ref 37–54)
HGB BLD-MCNC: 10 G/DL (ref 12.5–16.5)
IMM GRANULOCYTES # BLD AUTO: 0.04 K/UL (ref 0–0.58)
IMM GRANULOCYTES NFR BLD: 1 % (ref 0–5)
LYMPHOCYTES NFR BLD: 1.92 K/UL (ref 1.5–4)
LYMPHOCYTES RELATIVE PERCENT: 26 % (ref 20–42)
MCH RBC QN AUTO: 30.7 PG (ref 26–35)
MCHC RBC AUTO-ENTMCNC: 31.1 G/DL (ref 32–34.5)
MCV RBC AUTO: 98.8 FL (ref 80–99.9)
MONO/MACROPHAGE FLUID: 28 %
MONOCYTES NFR BLD: 0.7 K/UL (ref 0.1–0.95)
MONOCYTES NFR BLD: 10 % (ref 2–12)
NEUTROPHIL, FLUID: 72 %
NEUTROPHILS NFR BLD: 62 % (ref 43–80)
NEUTS SEG NFR BLD: 4.55 K/UL (ref 1.8–7.3)
PLATELET # BLD AUTO: 265 K/UL (ref 130–450)
PMV BLD AUTO: 8.9 FL (ref 7–12)
POTASSIUM SERPL-SCNC: 4.6 MMOL/L (ref 3.5–5.1)
PROT SERPL-MCNC: 6.7 G/DL (ref 6.4–8.3)
RBC # BLD AUTO: 3.26 M/UL (ref 3.8–5.8)
RBC, SYNOVIAL FLUID: ABNORMAL CELLS/UL
SODIUM SERPL-SCNC: 142 MMOL/L (ref 136–145)
WBC COUNT, SYNOVIAL FLUID: 1694 CELLS/UL
WBC OTHER # BLD: 7.4 K/UL (ref 4.5–11.5)

## 2025-05-13 PROCEDURE — 87070 CULTURE OTHR SPECIMN AEROBIC: CPT

## 2025-05-13 PROCEDURE — 73562 X-RAY EXAM OF KNEE 3: CPT

## 2025-05-13 PROCEDURE — 86140 C-REACTIVE PROTEIN: CPT

## 2025-05-13 PROCEDURE — 80053 COMPREHEN METABOLIC PANEL: CPT

## 2025-05-13 PROCEDURE — 85025 COMPLETE CBC W/AUTO DIFF WBC: CPT

## 2025-05-13 PROCEDURE — 87205 SMEAR GRAM STAIN: CPT

## 2025-05-13 PROCEDURE — 89060 EXAM SYNOVIAL FLUID CRYSTALS: CPT

## 2025-05-13 PROCEDURE — 85652 RBC SED RATE AUTOMATED: CPT

## 2025-05-13 PROCEDURE — 87040 BLOOD CULTURE FOR BACTERIA: CPT

## 2025-05-13 PROCEDURE — 89051 BODY FLUID CELL COUNT: CPT

## 2025-05-13 PROCEDURE — 99284 EMERGENCY DEPT VISIT MOD MDM: CPT

## 2025-05-13 PROCEDURE — 6370000000 HC RX 637 (ALT 250 FOR IP)

## 2025-05-13 RX ORDER — HYDROCODONE BITARTRATE AND ACETAMINOPHEN 5; 325 MG/1; MG/1
1 TABLET ORAL ONCE
Status: COMPLETED | OUTPATIENT
Start: 2025-05-13 | End: 2025-05-13

## 2025-05-13 RX ADMIN — HYDROCODONE BITARTRATE AND ACETAMINOPHEN 1 TABLET: 5; 325 TABLET ORAL at 20:38

## 2025-05-13 ASSESSMENT — PAIN DESCRIPTION - ORIENTATION
ORIENTATION: RIGHT
ORIENTATION: RIGHT

## 2025-05-13 ASSESSMENT — PAIN DESCRIPTION - LOCATION
LOCATION: KNEE
LOCATION: KNEE

## 2025-05-13 ASSESSMENT — LIFESTYLE VARIABLES
HOW MANY STANDARD DRINKS CONTAINING ALCOHOL DO YOU HAVE ON A TYPICAL DAY: PATIENT DOES NOT DRINK
HOW OFTEN DO YOU HAVE A DRINK CONTAINING ALCOHOL: NEVER

## 2025-05-13 ASSESSMENT — PAIN SCALES - GENERAL
PAINLEVEL_OUTOF10: 6
PAINLEVEL_OUTOF10: 4

## 2025-05-13 ASSESSMENT — PAIN - FUNCTIONAL ASSESSMENT: PAIN_FUNCTIONAL_ASSESSMENT: 0-10

## 2025-05-13 NOTE — TELEPHONE ENCOUNTER
PT walked into the ofc and approached me at the window. He states he has an infection of his knee and wants to know what he should do. After consulting with the nurse, I advised the patient that he needed to be evaluated in the ER and offered him transportation. Pt declined transportation so I provided him with instructions on how to get to the ER from this ofc. He wanted to know why we would not just give him PNC. I advised that if he does have an infection of his joint then an oral abx is not always enough and that he needed further eval and tx w/ ER. Pt got up and walked out of the waiting room exclaiming how \"this is all bullshit, absolute bullshit\" and left.

## 2025-05-13 NOTE — TELEPHONE ENCOUNTER
Nakia from [facility] called. She saw pt today and his right knee is red, swollen, and hot to touch.  She is asking for recommendations. [facility] so [facility] are unable to send a [facility]. Recommended that pt go to [facility] ED to be evalutated.

## 2025-05-13 NOTE — CONSULTS
Results   Component Value Date/Time    WBC 7.4 05/13/2025 02:45 PM    RBC 3.26 05/13/2025 02:45 PM    HGB 10.0 05/13/2025 02:45 PM    HCT 32.2 05/13/2025 02:45 PM    MCV 98.8 05/13/2025 02:45 PM    MCH 30.7 05/13/2025 02:45 PM    MCHC 31.1 05/13/2025 02:45 PM    RDW 14.5 05/13/2025 02:45 PM     05/13/2025 02:45 PM    MPV 8.9 05/13/2025 02:45 PM     PT/INR:    Lab Results   Component Value Date/Time    PROTIME 10.7 04/08/2025 10:00 AM    INR 1.0 04/08/2025 10:00 AM       Radiology Review:  X-rays of the right knee reviewed.  Status post right total knee arthroplasty.  Mild effusion present.  No air or significant soft tissue swelling.  No fractures noted.    IMPRESSION:  Right total knee arthroplasty on 4/14 with Dr. Troncoso  Rule out prosthetic joint infection    PLAN:  So far patient's lab work is reassuring.  His CRP was within normal limits and he does not have an elevated serum white blood cell count.  It is somewhat of a concern that he had drainage from the knee at this point 1 month after his surgery.  For this reason, the decision was made to aspirate the patient's knee to be sent to the lab for cell count, cultures, and Gram stain.  We explained the next steps to the patient.  If the knee is infected, since drainage just started recently we can attempt a single-stage polyethylene exchange and place patient on antibiotics.  We also discussed a two-stage revision procedure which may be necessary if the patient has more of a chronic infection.  Await labs  Discuss with attending      Electronically signed by Carlos Enrique Cline DO on 5/13/2025 at 6:30 PM

## 2025-05-13 NOTE — ED PROVIDER NOTES
[facility] EMERGENCY DEPARTMENT  EMERGENCY DEPARTMENT ENCOUNTER        Pt Name: Rafyf Oseguera  MRN: [id number]  Birthdate 1950  Date of evaluation: 5/13/2025  Provider: Wendy Quinonez MD  PCP: Hansel Nagy MD  Note Started: 2:32 PM EDT 5/13/25    CHIEF COMPLAINT       Chief Complaint   Patient presents with    Knee Pain     Patient arrives with concern for infection at his knee replacement site. Swelling to the extremity and redness noted. Sent in by PCP during nurse visit       HISTORY OF PRESENT ILLNESS: 1 or more Elements   History From: patient    Limitations to history : None    Raffy Oseguera is a 75 y.o. male who presents for evaluation of the patient's right knee.  He states patient had surgery on his right knee 1 month ago by Dr. Sharif.  Patient states he has always had some redness and states it has improved today.  In addition he has had improvement in pain.  He states his home health nurse came to see him and was concerned for possible infection.  Patient denies fever or other associated symptoms such as chest pain, shortness of breath, abdominal pain    Nursing Notes were all reviewed and agreed with or any disagreements were addressed in the HPI.        REVIEW OF EXTERNAL NOTE :       Patient was seen in office on 5/5/2025 for status post total knee arthroplasty on the right    REVIEW OF SYSTEMS :           Positives and Pertinent negatives as per HPI.     SURGICAL HISTORY     Past Surgical History:   Procedure Laterality Date    CORONARY ANGIOPLASTY WITH STENT PLACEMENT  10/23/2018    3.5 x 26 Resolute José to mid RCA by Dr. Godinez    NOSE SURGERY N/A 11/16/2020    EXCISION RIGHT NASAL LESION WITH RECONSTRUCTION WITH FROZEN SECTION performed by Ralph Lopez DO at [facility] OR    TOTAL KNEE ARTHROPLASTY Right 4/14/2025    Right Knee Total Arthroplasty performed by Jorge Troncoso MD at [facility] OR       CURRENTMEDICATIONS

## 2025-05-14 ENCOUNTER — EVALUATION (OUTPATIENT)
Dept: PHYSICAL THERAPY | Age: 75
End: 2025-05-14
Payer: MEDICARE

## 2025-05-14 ENCOUNTER — TELEPHONE (OUTPATIENT)
Age: 75
End: 2025-05-14

## 2025-05-14 DIAGNOSIS — Z96.651 S/P TOTAL KNEE ARTHROPLASTY, RIGHT: Primary | ICD-10-CM

## 2025-05-14 LAB
CRYSTALS FLD MICRO: NORMAL
PATH INTERP FLD-IMP: NORMAL
SPECIMEN TYPE: NORMAL

## 2025-05-14 PROCEDURE — 97110 THERAPEUTIC EXERCISES: CPT | Performed by: PHYSICAL THERAPIST

## 2025-05-14 PROCEDURE — 97116 GAIT TRAINING THERAPY: CPT | Performed by: PHYSICAL THERAPIST

## 2025-05-14 PROCEDURE — 97161 PT EVAL LOW COMPLEX 20 MIN: CPT | Performed by: PHYSICAL THERAPIST

## 2025-05-14 NOTE — TELEPHONE ENCOUNTER
Patient called for an ER follow up.  Patient was in the ER on 5/13/2025 for knee pain.   Dr Cline seen patient .   Should patient come in sooner or keep 6/2/2025 appointment?

## 2025-05-14 NOTE — PROGRESS NOTES
[facility]          Phone: [phone] Fax: [phone]    Physical Therapy Daily Treatment Note  Date:  2025    Patient Name:  Raffy Oseguera    :  1950  MRN: [id number]    Evaluating therapist: Flaca Vincent, PT, DPT  [id number]    Restrictions/Precautions:      Diagnosis:     Diagnosis Orders   1. S/P total knee arthroplasty, right          Treatment Diagnosis:    Insurance/Certification information:  [employer] Medicare  Referring Physician:  FABRICE Vogt  Plan of care signed (Y/N):    Visit# / total visits:    Pain level: 4-/10   Time In:  0850  Time Out:  930    Subjective:  See initial evaluation    Exercises:  Exercise/Equipment Resistance/Repetitions Other comments            Quad sets 10x 5 sec hold      Heel slides 10x 5 sec hold      SLR 5x      Seated HS stretch 2x 30 sec hold             Bike       SAQ       LAQ       HS curl       Step ups       3-way kicks                                                        Other:  Pt tolerated TE's with good understanding for HEP. Pt requesting to defer any further exercises this session d/t R knee pain upon arrival. Extensive education during session for improved walker and cane height, sequencing, and gait mechanics to improve symmetry of step length and fluid mechanics. Pt educated in safe stair negotiation with B HR on 4\" steps with non-reciprocal pattern.    Due to financial burden of co-pay pt agreeable to initiate therapy at 2x/week for first 2 weeks then space to 1x/week following.    Home Exercise Program:  Sit<>stand, quad sets, heel slides, SLR, seated HS stretch    Manual Treatments:  TBD    Modalities:  TBD     Time-in Time-out Total Time   11799  Evaluation Low Complexity 0850 0905 15   27813  Evaluation Med Complexity      36009  Evaluation High Complexity      76983  Ther Ex 0905 0920 15   27995  Neuro Re-ed        81945  Ther Activities        01206  Manual Therapy       22288  E-stim     
Ultrasound   [] Iontophoresis: 4 mg/mL Dexamethasone Sodium Phosphate 40-80 mAmin  [] Dry Needling      [x] Instruction in HEP      []  Medication allergies reviewed for use of Dexamethasone Sodium Phosphate 4mg/ml  with iontophoresis treatments.  Patient is not allergic.      The following CPT codes are likely to be used in the care of this patient: 99719 PT Evaluation: Low Complexity, 59070 PT Re-Evaluation, 42547 Therapeutic Exercise, 35272 Neuromuscular Re-Education, 19047 Therapeutic Activities, 29917 Manual Therapy, 22184 Gait Training, and 33851 Electric Stimulation      Suggested Professional Referral: [x] No  [] Yes:     Patient Education:  [x] Plans/Goals, Risks/Benefits discussed  [x] Home exercise program  Method of Education: [x] Verbal  [x] Demo  [x] Written  Comprehension of Education:  [x] Verbalizes understanding.  [x] Demonstrates understanding.  [] Needs Review.  [] Demonstrates/verbalizes understanding of HEP/Ed previously given.    Frequency: 1-2 days per week for 4-6 weeks    Patient understands diagnosis/prognosis and consents to treatment, plan and goals: [x] Yes    [] No     Thank you for the opportunity to work with your patient.  If you have questions or comments, please contact me at numbers listed above.    Electronically signed by: Flaca Vincent, PT, DPT  [id number]    Medicare Patients Only     Please sign Physician's Certification and return to:  [facility]  [address] [place] [address].  2ND FLOOR  [facility] [address]  Dept: [phone]  Dept Fax: [phone]  Loc: [phone]     Physician's Certification / Comments     Frequency/Duration 1-2 days per week for 4-6 weeks.   Certification period from 5/14/2025  to 8/14/2025.    I have reviewed the Plan of Care established for skilled therapy services and certify that the services are required and that they will be provided while the patient is under my

## 2025-05-14 NOTE — TELEPHONE ENCOUNTER
Keep 6/2 appt. So far, cultures are negative. WBC cell count not indicative of infection. CRP WNL.

## 2025-05-16 ENCOUNTER — TREATMENT (OUTPATIENT)
Dept: PHYSICAL THERAPY | Age: 75
End: 2025-05-16

## 2025-05-16 DIAGNOSIS — Z96.651 S/P TOTAL KNEE ARTHROPLASTY, RIGHT: Primary | ICD-10-CM

## 2025-05-16 NOTE — PROGRESS NOTES
Ther Activities        14416  Manual Therapy       43841  E-stim       60045  Ultrasound      85471  Gait training            Session 3357 9343 01       Treatment/Activity Tolerance:  [] Patient tolerated treatment well [] Patient limited by fatigue  [x] Patient limited by pain  [] Patient limited by other medical complications  [] Other:     Prognosis: [x] Good [] Fair  [] Poor    Patient Requires Follow-up: [x] Yes  [] No    Plan:   [x] Continue per plan of care [] Alter current plan (see comments)  [] Plan of care initiated [] Hold pending MD visit [] Discharge    Plan for Next Session:  progress R knee ROM and strength      Electronically signed by:  Flaca Vincent, PT, DPT  [id number]

## 2025-05-18 LAB
MICROORGANISM SPEC CULT: NO GROWTH
MICROORGANISM SPEC CULT: NORMAL
MICROORGANISM SPEC CULT: NORMAL
MICROORGANISM/AGENT SPEC: NORMAL
SERVICE CMNT-IMP: NORMAL
SPECIMEN DESCRIPTION: NORMAL

## 2025-05-21 ENCOUNTER — TREATMENT (OUTPATIENT)
Dept: PHYSICAL THERAPY | Age: 75
End: 2025-05-21
Payer: MEDICARE

## 2025-05-21 DIAGNOSIS — Z96.651 S/P TOTAL KNEE ARTHROPLASTY, RIGHT: Primary | ICD-10-CM

## 2025-05-21 PROCEDURE — 97110 THERAPEUTIC EXERCISES: CPT | Performed by: PHYSICAL THERAPIST

## 2025-05-21 NOTE — PROGRESS NOTES
Evaluation Med Complexity      24397  Evaluation High Complexity      00286  Ther Ex 1015 1045 30   45555  Neuro Re-ed        05876  Ther Activities        42454  Manual Therapy       36660  E-stim       52291  Ultrasound      92905  Gait training            Session 1015 1045 30       Treatment/Activity Tolerance:  [x] Patient tolerated treatment well [] Patient limited by fatigue  [] Patient limited by pain  [] Patient limited by other medical complications  [] Other:     Prognosis: [x] Good [] Fair  [] Poor    Patient Requires Follow-up: [x] Yes  [] No    Plan:   [x] Continue per plan of care [] Alter current plan (see comments)  [] Plan of care initiated [] Hold pending MD visit [] Discharge    Plan for Next Session:  progress R knee ROM and strength      Electronically signed by:  Flaca Vincent, PT, DPT  [id number]

## 2025-05-23 ENCOUNTER — TREATMENT (OUTPATIENT)
Dept: PHYSICAL THERAPY | Age: 75
End: 2025-05-23

## 2025-05-23 DIAGNOSIS — Z96.651 S/P TOTAL KNEE ARTHROPLASTY, RIGHT: Primary | ICD-10-CM

## 2025-05-23 NOTE — PROGRESS NOTES
[facility]          Phone: [phone] Fax: [phone]    Physical Therapy Daily Treatment Note  Date:  2025    Patient Name:  Raffy Oseguera    :  1950  MRN: [id number]    Evaluating therapist: Flaca Vincent, PT, DPT  [id number]    Restrictions/Precautions:      Diagnosis:     Diagnosis Orders   1. S/P total knee arthroplasty, right            Treatment Diagnosis:    Insurance/Certification information:  [employer] Medicare  Referring Physician:  FABRICE Vogt  Plan of care signed (Y/N):  yes  Visit# / total visits:    Pain level: 1/10 upon arrival  Time In:  0800  Time Out:  08    Subjective:  Pt reports he feels unsafe with the SPC due to difficulty with sequencing.    Exercises:  Exercise/Equipment Resistance/Repetitions Other comments            Quad sets 12x 5 sec hold      Heel slides 10x 5 sec hold      SLR 5x2      Seated HS stretch 2x 30 sec hold             Bike 10 min Seat 9     SAQ      LAQ 10x           HS curl 10x RTB     Step ups 10x R LE leading 6\" step, B HR for balance     3-way kicks 15x flex/abdc ea side, 10x extension In // bars            Sit<>stand 10x  From mat table     Heel raises 15x With B UE support on FWW                   R knee ROM: 3-118° AAROM              Other:  Per pt request to attempt amb without AD as he has been going short distances at home therapist encouraged mobility between all areas during treatment session to be without AD. Pt did not have any LOB or instability of R LE. Provided additional HEP as pt to begin spacing to 1x/week at this time per his request.    Due to financial burden of co-pay pt agreeable to initiate therapy at 2x/week for first 2 weeks then space to 1x/week following.    Home Exercise Program:  Sit<>stand, quad sets, heel slides, SLR, seated HS stretch, HS curls, step ups, heel raises, 3 way kicks, LAQ    Manual Treatments:  TBD    Modalities:  TBD     Time-in Time-out Total Time   30070

## 2025-05-29 ENCOUNTER — TREATMENT (OUTPATIENT)
Dept: PHYSICAL THERAPY | Age: 75
End: 2025-05-29
Payer: MEDICARE

## 2025-05-29 DIAGNOSIS — M17.11 OSTEOARTHRITIS OF RIGHT KNEE, UNSPECIFIED OSTEOARTHRITIS TYPE: Primary | ICD-10-CM

## 2025-05-29 DIAGNOSIS — Z96.651 S/P TOTAL KNEE ARTHROPLASTY, RIGHT: Primary | ICD-10-CM

## 2025-05-29 PROCEDURE — 97110 THERAPEUTIC EXERCISES: CPT | Performed by: PHYSICAL THERAPIST

## 2025-05-29 NOTE — PROGRESS NOTES
[facility]          Phone: [phone] Fax: [phone]    Physical Therapy Daily Treatment Note  Date:  2025    Patient Name:  Raffy Oseguera    :  1950  MRN: [id number]    Evaluating therapist: Flaca Vincent PT, DPT  [id number]    Restrictions/Precautions:      Diagnosis:     Diagnosis Orders   1. S/P total knee arthroplasty, right            Treatment Diagnosis:    Insurance/Certification information:  [employer]  Referring Physician:  FABRICE Vogt  Plan of care signed (Y/N):  yes  Visit# / total visits:    Pain level: 1/10 upon arrival  Time In:  930  Time Out:  1008    Subjective:  Pt reports falling onto R knee while attempting to hurry to the bathroom while not feeling well since last session. He did not seek medical care and does not feel he damaged the joint but does have bruising noted on lateral aspect of knee.    Pt has ortho follow up on 25.    Exercises:  Exercise/Equipment Resistance/Repetitions Other comments            Quad sets 12x 5 sec hold      Heel slides 10x 5 sec hold      SLR 5x2      Seated HS stretch 2x 30 sec hold             Bike 10 min Seat 10     SAQ 10x 2 sec hold      LAQ 10x           HS curl 10x RTB     Step ups 10x R LE leading 6\" step, B HR for balance     3-way kicks In // bars            Sit<>stand 10x  From mat table     Heel raises 15x With B UE support on FWW     TKE 10x RTB            R knee ROM: 3-118° AAROM 25: R knee ROM 5-115° AROM             Other:  Pt tolerated TE's this date with increased pain following bike but was able to complete all TE's with good form. 3-way kick held this date due to elevated pain and was provocative last session.    Due to financial burden of co-pay pt agreeable to initiate therapy at 2x/week for first 2 weeks then space to 1x/week following.    Home Exercise Program:  Sit<>stand, quad sets, heel slides, SLR, seated HS stretch, HS curls, step ups, heel

## 2025-06-02 ENCOUNTER — HOSPITAL ENCOUNTER (OUTPATIENT)
Dept: GENERAL RADIOLOGY | Age: 75
Discharge: HOME | End: 2025-06-04
Payer: MEDICARE

## 2025-06-02 ENCOUNTER — OFFICE VISIT (OUTPATIENT)
Age: 75
End: 2025-06-02
Payer: MEDICARE

## 2025-06-02 VITALS
DIASTOLIC BLOOD PRESSURE: 79 MMHG | HEART RATE: 75 BPM | OXYGEN SATURATION: 97 % | TEMPERATURE: 97.8 F | RESPIRATION RATE: 20 BRPM | SYSTOLIC BLOOD PRESSURE: 150 MMHG

## 2025-06-02 DIAGNOSIS — M17.11 OSTEOARTHRITIS OF RIGHT KNEE, UNSPECIFIED OSTEOARTHRITIS TYPE: ICD-10-CM

## 2025-06-02 DIAGNOSIS — Z96.651 S/P TOTAL KNEE ARTHROPLASTY, RIGHT: Primary | ICD-10-CM

## 2025-06-02 PROCEDURE — 73562 X-RAY EXAM OF KNEE 3: CPT

## 2025-06-02 PROCEDURE — 99024 POSTOP FOLLOW-UP VISIT: CPT | Performed by: PHYSICIAN ASSISTANT

## 2025-06-02 NOTE — PATIENT INSTRUCTIONS
Weightbearing as tolerated right lower extremity.    Continue therapy.    Follow-up in 6-8 weeks for reevaluation and x-rays    Call if any questions or concerns

## 2025-06-02 NOTE — PROGRESS NOTES
Chief Complaint   Patient presents with    Follow-up      7 wk PO R TKA. DOS 04/14/25 TTS (*GP ends 7-). Patient states pain lvl 1          OP:SURGEON: Dr. Jorge Troncoso MD  DATE OF PROCEDURE: 4-14-25  PROCEDURE: Right Knee Total Arthroplasty     POD: 7 weeks    Subjective:  Raffy Oseguera is following up from the above surgery. He is WBAT on right lower extremity. He ambulates with assistive device, walker.  Pain to extremity is mild and is not taking prescribed pain medication. They denies numbness or tingling to the right lower extremity. Denies calf pain, chest pain, or shortness of breath.  Patient has finished DVT prophylaxis. Patient is  participating in therapy, outpatient therapy.  Patient is doing well after surgery.  He did go to the ED couple weeks ago for knee swelling.  Knee was aspirated and lab analysis was negative for infectious process.  He states the swelling has gone down in his knee.  He denies fever, chills or night sweats.     Review of Systems -  All pertinent positives/negative in HPI     Objective:    General: Alert and oriented X 3, normocephalic atraumatic, external ears and eye normal, sclera clear, no acute distress, respirations easy and unlabored with no audible wheezes, skin warm and dry, speech and dress appropriate for noted age, affect euthymic.    Extremity:  Right Lower Extremity  Skin clean dry and intact, without signs of infection   Incision well-healed  no edema noted  Compartments supple throughout thigh and leg  Calf supple and not tender  negative Homans  Demonstrates active motion with ankle DF/PF  Knee AROM 3-120  States sensation intact to touch in sural, deep peroneal, superficial peroneal, saphenous, posterior tibial  nerve distributions to foot/ankle.  Palpable dorsalis pedis and posterior tibialis pulses, cap refill brisk in toes, foot warm/perfused.    BP (!) 150/79 (BP Site: Right Upper Arm, Patient Position: Sitting, BP Cuff Size: Medium Adult)

## 2025-06-05 ENCOUNTER — TREATMENT (OUTPATIENT)
Dept: PHYSICAL THERAPY | Age: 75
End: 2025-06-05

## 2025-06-05 DIAGNOSIS — Z96.651 S/P TOTAL KNEE ARTHROPLASTY, RIGHT: Primary | ICD-10-CM

## 2025-06-05 NOTE — PROGRESS NOTES
[facility]                Phone: [phone] Fax: [phone]    Physical Therapy  Outpatient Discharge Summary     Date:  2025    Patient Name:  Raffy Oseguera    :  1950  MRN: [id number]    DIAGNOSIS:     Diagnosis Orders   1. S/P total knee arthroplasty, right          REFERRING PHYSICIAN:  FABRICE Vogt    ATTENDANCE:  Pt has attended 6 of 6 scheduled treatments from 2025 to 2025.  TREATMENTS RECEIVED:  Skilled PT services included therapeutic exercises for increased ROM and strength post-op; gait training for use of SPC    INITIAL STATUS:  Pain reported 0-8/10  ROM decreased in R knee flexion and extension  Strength decreased throughout R LE  Decreased functional ability with walking, stairs, limited tolerance to weightbearing tasks and weightbearing duration, inability to participate in exercise regimen / fitness program  LEFS 35/80    FINAL STATUS:  Pain reported 0-4/10  R knee ROM: 3-115°  R knee MMT: 4+/5 flexion and extension  Improved functional mobility and transfers ability. Pt continues to use FWW for functional mobility for comfort and confidence with balance.   LEFS 55/80    GOALS:  3 out of 6 Long Term Goals were obtained.    LONG TERM GOALS NOT OBTAINED/REASON:  ROM significantly improved but did not achieve goal levels. Pain elevates to 4/10 with extended community distances limiting achievement of pain-related goals    PATIENT GOALS:   pain relief, get back to normal, return to exercise regimen / fitness program, learn how to manage condition -- partially met    REASON FOR DISCHARGE:  pt requesting d/c    PATIENT EDUCATION/INSTRUCTIONS:  continue with HEP to improve ROM and strength     RECOMMENDATIONS:  follow up with ortho as planned, continue with HEP        Thank you for the opportunity to work with your patient. If you have questions or comments, please feel free to contact me by phone or fax.      Electronically Signed

## 2025-06-05 NOTE — PROGRESS NOTES
[facility]          Phone: [phone] Fax: [phone]    Physical Therapy Daily Treatment Note  Date:  2025    Patient Name:  Raffy Oseguera    :  1950  MRN: [id number]    Evaluating therapist: Flaca Vincent PT, DPT  [id number]    Restrictions/Precautions:      Diagnosis:     Diagnosis Orders   1. S/P total knee arthroplasty, right          Treatment Diagnosis:    Insurance/Certification information:  [employer] Medicare  Referring Physician:  FABRICE Vogt  Plan of care signed (Y/N):  yes  Visit# / total visits:    Pain level: 2/10 upon arrival  Time In:  930  Time Out:  950    Subjective:  Pt reports he feels he is ready to d/c to independent HEP at this time. Had follow up with ortho earlier this week and reports normal healing without complications.       Exercises:  Exercise/Equipment Resistance/Repetitions Other comments            Quad sets 10x 5 sec hold      Heel slides 10x 5 sec hold      SLR      Seated HS stretch               Stepper 10 min Level 1     SAQ 10x 2 sec hold      LAQ 10x           HS curl 10x RTB     Step ups 10x R LE leading 6\" step, B HR for balance     3-way kicks In // bars            Sit<>stand 10x  From mat table     Heel raises 15x With B UE support on FWW     TKE 10x RTB            R knee ROM: 3-118° AAROM 25: R knee ROM 5-115° AROM 25 R knee ROM:  3-115°       LEFS: 55/80     R knee MMT: 4+/5 extension and flexion     Other:  Pt reports he has been able to amb community distances such as grocery shopping with pain only elevating to 3-4/10. Pt is requesting to d/c therapy at this time and wishes to continue with independent HEP. Pt has printed exercises and appropriate thera-bands for HEP    Due to financial burden of co-pay pt agreeable to initiate therapy at 2x/week for first 2 weeks then space to 1x/week following.    Home Exercise Program:  Sit<>stand, quad sets, heel slides, SLR, seated HS stretch, HS curls,

## 2025-07-07 ENCOUNTER — TELEPHONE (OUTPATIENT)
Dept: AUDIOLOGY | Age: 75
End: 2025-07-07

## 2025-07-07 ENCOUNTER — PROCEDURE VISIT (OUTPATIENT)
Dept: AUDIOLOGY | Age: 75
End: 2025-07-07

## 2025-07-07 DIAGNOSIS — H90.3 SENSORINEURAL HEARING LOSS (SNHL) OF BOTH EARS: Primary | ICD-10-CM

## 2025-07-07 NOTE — PROGRESS NOTES
Patient here for hearing aid check.   Left Hearing aid wire broken off.   Will send in for repair - out of warranty. Will call with quote.     Impression taken without incident to make new cShell    Patient needs new wax filters when back from repair          Called patients insurance and he does have HA benefits through [employer] and benefit has not been used at this time . Called and let patient know of this. He still wants me to send in HA for repair at this time.     Ref # to call  with insurance ([employer]) : [id number]      Tianna Resendiz/CCC-A  [place] Lic # [id number]

## 2025-07-07 NOTE — TELEPHONE ENCOUNTER
Patient called and left a message that his hearing aid is broken \" left one wire came out \" of back piece.   Spoke with patient who will come now to have me look at hearing aid. If piece simply needs placed back in , no charge. If needs a new wire, will have to order it at $150

## 2025-07-08 DIAGNOSIS — Z96.651 S/P TOTAL KNEE ARTHROPLASTY, RIGHT: Primary | ICD-10-CM

## 2025-07-16 ENCOUNTER — HOSPITAL ENCOUNTER (OUTPATIENT)
Dept: GENERAL RADIOLOGY | Age: 75
Discharge: HOME | End: 2025-07-18
Payer: MEDICARE

## 2025-07-16 ENCOUNTER — OFFICE VISIT (OUTPATIENT)
Age: 75
End: 2025-07-16
Payer: MEDICARE

## 2025-07-16 VITALS
OXYGEN SATURATION: 100 % | RESPIRATION RATE: 16 BRPM | HEART RATE: 66 BPM | DIASTOLIC BLOOD PRESSURE: 75 MMHG | TEMPERATURE: 98 F | SYSTOLIC BLOOD PRESSURE: 135 MMHG

## 2025-07-16 DIAGNOSIS — Z96.651 S/P TOTAL KNEE ARTHROPLASTY, RIGHT: Primary | ICD-10-CM

## 2025-07-16 DIAGNOSIS — M17.0 PRIMARY OSTEOARTHRITIS OF BOTH KNEES: ICD-10-CM

## 2025-07-16 DIAGNOSIS — Z96.651 S/P TOTAL KNEE ARTHROPLASTY, RIGHT: ICD-10-CM

## 2025-07-16 PROCEDURE — 73560 X-RAY EXAM OF KNEE 1 OR 2: CPT

## 2025-07-16 NOTE — PATIENT INSTRUCTIONS
Weightbearing as tolerated right lower extremity.     Continue home exercise program.    Patient is interested in left TKA later this year possibly in November.    Follow-up in 2 months for reevaluation, bilateral knee x-rays and possible scheduling for left TKA    Call if any questions or concerns

## 2025-07-21 ENCOUNTER — TELEPHONE (OUTPATIENT)
Dept: AUDIOLOGY | Age: 75
End: 2025-07-21

## 2025-07-21 NOTE — TELEPHONE ENCOUNTER
[facility] called to get quote and timeline of repair  Holding until approved - c shell no charge   Approved $250 HA repair      Called patient to okay the above - he said to go ahead and get repaired    Called [facility] to approve repair     Will call when in from [facility] for  and payment.       Ashley Tipton, Tianna/CCC-A  [place] Lic # [id number]

## 2025-08-05 DIAGNOSIS — K21.9 GASTROESOPHAGEAL REFLUX DISEASE, UNSPECIFIED WHETHER ESOPHAGITIS PRESENT: ICD-10-CM

## 2025-08-05 RX ORDER — ATORVASTATIN CALCIUM 40 MG/1
40 TABLET, FILM COATED ORAL DAILY
Qty: 90 TABLET | Refills: 0 | Status: SHIPPED | OUTPATIENT
Start: 2025-08-05

## 2025-08-05 RX ORDER — FAMOTIDINE 20 MG/1
20 TABLET, FILM COATED ORAL 2 TIMES DAILY
Qty: 180 TABLET | Refills: 0 | Status: SHIPPED | OUTPATIENT
Start: 2025-08-05

## 2025-08-11 ENCOUNTER — TELEPHONE (OUTPATIENT)
Dept: AUDIOLOGY | Age: 75
End: 2025-08-11

## 2025-08-15 ENCOUNTER — PROCEDURE VISIT (OUTPATIENT)
Dept: AUDIOLOGY | Age: 75
End: 2025-08-15

## 2025-08-15 DIAGNOSIS — H90.A32 MIXED CONDUCTIVE AND SENSORINEURAL HEARING LOSS OF LEFT EAR WITH RESTRICTED HEARING OF RIGHT EAR: ICD-10-CM

## 2025-08-15 DIAGNOSIS — H90.3 SENSORINEURAL HEARING LOSS (SNHL) OF BOTH EARS: Primary | ICD-10-CM

## (undated) DEVICE — ELECTRODE ELECSURG NDL 2.8 INX7.2 CM COAT INSUL EDGE

## (undated) DEVICE — GLOVE ORANGE PI 8 1/2   MSG9085

## (undated) DEVICE — SYRINGE, LUER LOCK, 10ML: Brand: MEDLINE

## (undated) DEVICE — SOLUTION IV IRRIG POUR BRL 0.9% SODIUM CHL 2F7124

## (undated) DEVICE — PACK PROCEDURE SURG GEN CUST

## (undated) DEVICE — SET SURG BASIN MAYO REUSABLE

## (undated) DEVICE — HOOD WITH PEEL AWAY FACE SHIELD: Brand: T7PLUS

## (undated) DEVICE — 2108 SERIES SAGITTAL BLADE FAN, OFFSET  (34.5 X 0.8 X 64.0MM)

## (undated) DEVICE — 450 ML BOTTLE OF 0.05% CHLORHEXIDINE GLUCONATE IN 99.95% STERILE WATER FOR IRRIGATION, USP AND APPLICATOR.: Brand: IRRISEPT ANTIMICROBIAL WOUND LAVAGE

## (undated) DEVICE — Device

## (undated) DEVICE — GOWN,SIRUS,FABRNF,L,20/CS: Brand: MEDLINE

## (undated) DEVICE — SOLUTION WND IRRIGATION 450 ML 0.5 PVP-I 0.9 NACL

## (undated) DEVICE — MARKER,SKIN,WI/RULER AND LABELS: Brand: MEDLINE

## (undated) DEVICE — GOWN,SIRUS,FABRNF,2XL,18/CS: Brand: MEDLINE

## (undated) DEVICE — SYRINGE MED 50ML LUERLOCK TIP

## (undated) DEVICE — ELECTRODE PT RET AD L9FT HI MOIST COND ADH HYDRGEL CORDED

## (undated) DEVICE — ELECTRODE ES L3IN S STL BLDE INSUL DISP VALLEYLAB EDGE

## (undated) DEVICE — HOLSTER ES DISP FOR FLAT SURF TO SHLD SUCT COAG VALLEYLAB

## (undated) DEVICE — GAUZE,SPONGE,4"X4",8PLY,STRL,LF,10/TRAY: Brand: MEDLINE

## (undated) DEVICE — NEEDLE FLTR 18GA L1.5IN MEM THK5UM BLNT DISP

## (undated) DEVICE — BOWL AND CEMENT CARTRIDGE WITH BREAKAWAY FEMORAL NOZZLE: Brand: ACM

## (undated) DEVICE — DRESSING HYDROFIBER AQUACEL AG ADVANTAGE 3.5X12 IN

## (undated) DEVICE — STRYKER PERFORMANCE SERIES SAGITTAL BLADE: Brand: STRYKER PERFORMANCE SERIES

## (undated) DEVICE — TOWEL,OR,DSP,ST,BLUE,STD,6/PK,12PK/CS: Brand: MEDLINE

## (undated) DEVICE — INTENDED FOR TISSUE SEPARATION, AND OTHER PROCEDURES THAT REQUIRE A SHARP SURGICAL BLADE TO PUNCTURE OR CUT.: Brand: BARD-PARKER ® STAINLESS STEEL BLADES

## (undated) DEVICE — TRAY PROCED PLASTIC CUSTOME SOFT TISS

## (undated) DEVICE — PIN GUIDE ORTHO 3.2X89MM FLTD DISP PK OF 4 PER PATIENT

## (undated) DEVICE — TUBING SUCT 12FR MAL ALUM SHFT FN CAP VENT UNIV CONN W/ OBT

## (undated) DEVICE — SURGICAL PROCEDURE PACK EENT CUST

## (undated) DEVICE — 4-PORT MANIFOLD: Brand: NEPTUNE 2

## (undated) DEVICE — BASIC SINGLE BASIN 1-LF: Brand: MEDLINE INDUSTRIES, INC.